# Patient Record
Sex: MALE | Race: WHITE | NOT HISPANIC OR LATINO | Employment: UNEMPLOYED | URBAN - METROPOLITAN AREA
[De-identification: names, ages, dates, MRNs, and addresses within clinical notes are randomized per-mention and may not be internally consistent; named-entity substitution may affect disease eponyms.]

---

## 2019-03-05 ENCOUNTER — HOSPITAL ENCOUNTER (EMERGENCY)
Facility: HOSPITAL | Age: 54
Discharge: HOME | End: 2019-03-06
Attending: EMERGENCY MEDICINE
Payer: COMMERCIAL

## 2019-03-05 DIAGNOSIS — E87.1 HYPONATREMIA: Primary | ICD-10-CM

## 2019-03-05 LAB
ANION GAP SERPL CALCULATED.3IONS-SCNC: 7 MMOL/L (ref 4–13)
BASOPHILS # BLD AUTO: 0.09 THOUSANDS/ΜL (ref 0–0.1)
BASOPHILS NFR BLD AUTO: 1 % (ref 0–1)
BUN SERPL-MCNC: 12 MG/DL (ref 5–25)
CALCIUM SERPL-MCNC: 8.8 MG/DL (ref 8.3–10.1)
CHLORIDE SERPL-SCNC: 95 MMOL/L (ref 100–108)
CO2 SERPL-SCNC: 28 MMOL/L (ref 21–32)
CREAT SERPL-MCNC: 0.65 MG/DL (ref 0.6–1.3)
EOSINOPHIL # BLD AUTO: 0.26 THOUSAND/ΜL (ref 0–0.61)
EOSINOPHIL NFR BLD AUTO: 4 % (ref 0–6)
ERYTHROCYTE [DISTWIDTH] IN BLOOD BY AUTOMATED COUNT: 13.2 % (ref 11.6–15.1)
GFR SERPL CREATININE-BSD FRML MDRD: 110 ML/MIN/1.73SQ M
GLUCOSE SERPL-MCNC: 87 MG/DL (ref 65–140)
HCT VFR BLD AUTO: 40.2 % (ref 36.5–49.3)
HGB BLD-MCNC: 13.9 G/DL (ref 12–17)
IMM GRANULOCYTES # BLD AUTO: 0.02 THOUSAND/UL (ref 0–0.2)
IMM GRANULOCYTES NFR BLD AUTO: 0 % (ref 0–2)
LYMPHOCYTES # BLD AUTO: 1.47 THOUSANDS/ΜL (ref 0.6–4.47)
LYMPHOCYTES NFR BLD AUTO: 22 % (ref 14–44)
MCH RBC QN AUTO: 32.3 PG (ref 26.8–34.3)
MCHC RBC AUTO-ENTMCNC: 34.6 G/DL (ref 31.4–37.4)
MCV RBC AUTO: 94 FL (ref 82–98)
MONOCYTES # BLD AUTO: 0.53 THOUSAND/ΜL (ref 0.17–1.22)
MONOCYTES NFR BLD AUTO: 8 % (ref 4–12)
NEUTROPHILS # BLD AUTO: 4.44 THOUSANDS/ΜL (ref 1.85–7.62)
NEUTS SEG NFR BLD AUTO: 65 % (ref 43–75)
NRBC BLD AUTO-RTO: 0 /100 WBCS
PLATELET # BLD AUTO: 239 THOUSANDS/UL (ref 149–390)
PMV BLD AUTO: 9.9 FL (ref 8.9–12.7)
POTASSIUM SERPL-SCNC: 3.3 MMOL/L (ref 3.5–5.3)
RBC # BLD AUTO: 4.3 MILLION/UL (ref 3.88–5.62)
SODIUM SERPL-SCNC: 130 MMOL/L (ref 136–145)
WBC # BLD AUTO: 6.81 THOUSAND/UL (ref 4.31–10.16)

## 2019-03-05 PROCEDURE — 85025 COMPLETE CBC W/AUTO DIFF WBC: CPT | Performed by: EMERGENCY MEDICINE

## 2019-03-05 PROCEDURE — 96361 HYDRATE IV INFUSION ADD-ON: CPT

## 2019-03-05 PROCEDURE — 99283 EMERGENCY DEPT VISIT LOW MDM: CPT

## 2019-03-05 PROCEDURE — 96360 HYDRATION IV INFUSION INIT: CPT

## 2019-03-05 PROCEDURE — 36415 COLL VENOUS BLD VENIPUNCTURE: CPT | Performed by: EMERGENCY MEDICINE

## 2019-03-05 PROCEDURE — 80048 BASIC METABOLIC PNL TOTAL CA: CPT | Performed by: EMERGENCY MEDICINE

## 2019-03-05 RX ORDER — FERROUS SULFATE 325(65) MG
325 TABLET ORAL
Status: ON HOLD | COMMUNITY
End: 2020-11-13 | Stop reason: ALTCHOICE

## 2019-03-05 RX ORDER — LAMOTRIGINE 200 MG/1
200 TABLET ORAL 2 TIMES DAILY
COMMUNITY

## 2019-03-05 RX ORDER — OXYBUTYNIN CHLORIDE 5 MG/1
5 TABLET ORAL DAILY
COMMUNITY

## 2019-03-05 RX ORDER — SUCRALFATE 1 G/1
1 TABLET ORAL 4 TIMES DAILY
Status: ON HOLD | COMMUNITY
End: 2020-11-13 | Stop reason: ALTCHOICE

## 2019-03-05 RX ORDER — ACETAMINOPHEN 325 MG/1
650 TABLET ORAL ONCE
Status: COMPLETED | OUTPATIENT
Start: 2019-03-05 | End: 2019-03-05

## 2019-03-05 RX ORDER — PANTOPRAZOLE SODIUM 40 MG/1
40 TABLET, DELAYED RELEASE ORAL DAILY
COMMUNITY
End: 2020-11-17 | Stop reason: HOSPADM

## 2019-03-05 RX ORDER — VENLAFAXINE 100 MG/1
150 TABLET ORAL DAILY
Status: ON HOLD | COMMUNITY
End: 2020-11-13 | Stop reason: ALTCHOICE

## 2019-03-05 RX ORDER — POTASSIUM CHLORIDE 20 MEQ/1
40 TABLET, EXTENDED RELEASE ORAL ONCE
Status: COMPLETED | OUTPATIENT
Start: 2019-03-05 | End: 2019-03-05

## 2019-03-05 RX ORDER — LEVETIRACETAM 1000 MG/1
1000 TABLET ORAL 2 TIMES DAILY
COMMUNITY
Start: 2017-05-09

## 2019-03-05 RX ORDER — ASPIRIN 81 MG/1
81 TABLET, CHEWABLE ORAL DAILY
COMMUNITY

## 2019-03-05 RX ADMIN — SODIUM CHLORIDE 1000 ML: 0.9 INJECTION, SOLUTION INTRAVENOUS at 21:36

## 2019-03-05 RX ADMIN — ACETAMINOPHEN 650 MG: 325 TABLET ORAL at 23:48

## 2019-03-05 RX ADMIN — POTASSIUM CHLORIDE 40 MEQ: 1500 TABLET, EXTENDED RELEASE ORAL at 23:48

## 2019-03-06 VITALS
TEMPERATURE: 98 F | RESPIRATION RATE: 18 BRPM | WEIGHT: 138.89 LBS | HEART RATE: 95 BPM | SYSTOLIC BLOOD PRESSURE: 132 MMHG | DIASTOLIC BLOOD PRESSURE: 85 MMHG | OXYGEN SATURATION: 98 %

## 2019-03-06 NOTE — ED ATTENDING ATTESTATION
Mc Mitchell MD, saw and evaluated the patient  I have discussed the patient with the resident/non-physician practitioner and agree with the resident's/non-physician practitioner's findings, Plan of Care, and MDM as documented in the resident's/non-physician practitioner's note, except where noted  All available labs and Radiology studies were reviewed  I was present for key portions of any procedure(s) performed by the resident/non-physician practitioner and I was immediately available to provide assistance  At this point I agree with the current assessment done in the Emergency Department  I have conducted an independent evaluation of this patient a history and physical is as follows: patient sent here from [facility] [place] run today for sodium level of 130  No seizures  No weakness  No changes in drinking habits  Heart RRR, lungs CTA, abdomen soft, nontender  ED Course as of Mar 05 2344   Tue Mar 05, 2019   2342 IV sodium chloride has was completely infused  Potassium will be replaced as well              Critical Care Time  Procedures

## 2019-03-06 NOTE — DISCHARGE INSTRUCTIONS
We repleted your sodium with IV NaCl and gave your oral potassium  You can increase the salt in your diet to help this as well

## 2019-03-06 NOTE — ED PROVIDER NOTES
History  Chief Complaint   Patient presents with    Abnormal Lab     Pt and EMS states pt has been sent to ED for Sodium level of 130  Pt has no complaints  HPI  Patient is 59-year-old male past medical history epilepsy presenting for evaluation of hyponatremia found on outpatient lab test at [facility]  Patient states that he was having routine blood work performed, has no specific complaints and states that he feels healthy and at his normal baseline  Patient denies headaches, vision changes, weakness, fatigue, confusion  Patient denies any changes in urination  Patient denies any recent change in his water intake  Patient denies fevers, chills, nausea, vomiting, chest pain, abdominal pain, diarrhea  Prior to Admission Medications   Prescriptions Last Dose Informant Patient Reported?  Taking?   aspirin 81 mg chewable tablet 3/5/2019 at Unknown time  Yes Yes   Sig: Chew 81 mg daily   ferrous sulfate 325 (65 Fe) mg tablet 3/5/2019 at Unknown time  Yes Yes   Sig: Take 325 mg by mouth daily with breakfast   lamoTRIgine (LaMICtal) 200 MG tablet 3/5/2019 at Unknown time  Yes Yes   Sig: Take 25 mg by mouth 2 (two) times a day   levETIRAcetam (KEPPRA) 1000 MG tablet 3/5/2019 at Unknown time  Yes Yes   Sig: Take 1,000 mg by mouth 2 (two) times a day   oxybutynin (DITROPAN) 5 mg tablet 3/5/2019 at Unknown time  Yes Yes   Sig: Take 5 mg by mouth daily   pantoprazole (PROTONIX) 40 mg tablet 3/5/2019 at Unknown time  Yes Yes   Sig: Take 40 mg by mouth daily   sucralfate (CARAFATE) 1 g tablet 3/5/2019 at Unknown time  Yes Yes   Sig: Take 1 g by mouth 4 (four) times a day   venlafaxine (EFFEXOR) 100 MG tablet 3/5/2019 at Unknown time  Yes Yes   Sig: Take 150 mg by mouth daily      Facility-Administered Medications: None       Past Medical History:   Diagnosis Date    GERD (gastroesophageal reflux disease)     Hx of AKA (above knee amputation) (HCC)     Right    Seizures (HCC)     Ulcer of esophagus        Past Surgical History:   Procedure Laterality Date    BELOW KNEE LEG AMPUTATION      Left       History reviewed  No pertinent family history  I have reviewed and agree with the history as documented  Social History     Tobacco Use    Smoking status: Former Smoker   Substance Use Topics    Alcohol use: Not Currently    Drug use: Not Currently        Review of Systems   Constitutional: Negative for chills and fever  HENT: Negative for sore throat  Eyes: Negative for photophobia and visual disturbance  Respiratory: Negative for cough, chest tightness, shortness of breath and wheezing  Cardiovascular: Negative for chest pain, palpitations and leg swelling  Gastrointestinal: Negative for abdominal distention, abdominal pain, constipation, diarrhea, nausea and vomiting  Genitourinary: Negative for difficulty urinating, dysuria, flank pain and hematuria  Musculoskeletal: Negative for gait problem, joint swelling and myalgias  Neurological: Negative for syncope, weakness, numbness and headaches  All other systems reviewed and are negative  Physical Exam  ED Triage Vitals   Temperature Pulse Respirations Blood Pressure SpO2   03/05/19 1826 03/05/19 1826 03/05/19 1826 03/05/19 1826 03/05/19 1826   98 °F (36 7 °C) 105 16 148/90 98 %      Temp src Heart Rate Source Patient Position - Orthostatic VS BP Location FiO2 (%)   -- 03/05/19 2119 03/05/19 2119 03/05/19 2119 --    Monitor Sitting Right arm       Pain Score       03/05/19 1826       No Pain           Orthostatic Vital Signs  Vitals:    03/05/19 1826 03/05/19 2119 03/05/19 2237 03/06/19 0030   BP: 148/90 141/89 127/83 132/85   Pulse: 105 (!) 108 101 95   Patient Position - Orthostatic VS:  Sitting Lying Lying       Physical Exam   Constitutional: He is oriented to person, place, and time  He appears well-developed and well-nourished  No distress  HENT:   Head: Normocephalic and atraumatic     Right Ear: External ear normal    Left Ear: External ear normal    Nose: Nose normal    Mouth/Throat: Oropharynx is clear and moist  No oropharyngeal exudate  Eyes: Pupils are equal, round, and reactive to light  Conjunctivae are normal    Cardiovascular: Normal rate, regular rhythm, normal heart sounds and intact distal pulses  Exam reveals no gallop and no friction rub  No murmur heard  Pulmonary/Chest: Effort normal and breath sounds normal  No respiratory distress  He has no wheezes  He exhibits no tenderness  Abdominal: Soft  Bowel sounds are normal  He exhibits no distension and no mass  There is no tenderness  There is no rebound and no guarding  Musculoskeletal: He exhibits no edema or deformity  Bilateral above knee amputations   Neurological: He is alert and oriented to person, place, and time  No cranial nerve deficit or sensory deficit  He exhibits normal muscle tone  Coordination normal    Skin: Skin is warm and dry  Capillary refill takes less than 2 seconds  No rash noted  He is not diaphoretic  No erythema  Psychiatric: He has a normal mood and affect  His behavior is normal    Nursing note and vitals reviewed        ED Medications  Medications   sodium chloride 0 9 % bolus 1,000 mL (0 mL Intravenous Stopped 3/6/19 0039)   potassium chloride (K-DUR,KLOR-CON) CR tablet 40 mEq (40 mEq Oral Given 3/5/19 2348)   acetaminophen (TYLENOL) tablet 650 mg (650 mg Oral Given 3/5/19 2348)       Diagnostic Studies  Results Reviewed     Procedure Component Value Units Date/Time    Basic metabolic panel [[id number]]  (Abnormal) Collected:  03/05/19 2136    Lab Status:  Final result Specimen:  Blood from Arm, Left Updated:  03/05/19 2206     Sodium 130 mmol/L      Potassium 3 3 mmol/L      Chloride 95 mmol/L      CO2 28 mmol/L      ANION GAP 7 mmol/L      BUN 12 mg/dL      Creatinine 0 65 mg/dL      Glucose 87 mg/dL      Calcium 8 8 mg/dL      eGFR 110 ml/min/1 73sq m     Narrative:       National Kidney Disease Education Program recommendations are as follows:  GFR calculation is accurate only with a steady state creatinine  Chronic Kidney disease less than 60 ml/min/1 73 sq  meters  Kidney failure less than 15 ml/min/1 73 sq  meters  CBC and differential [[id number]] Collected:  03/05/19 2136    Lab Status:  Final result Specimen:  Blood from Arm, Left Updated:  03/05/19 2147     WBC 6 81 Thousand/uL      RBC 4 30 Million/uL      Hemoglobin 13 9 g/dL      Hematocrit 40 2 %      MCV 94 fL      MCH 32 3 pg      MCHC 34 6 g/dL      RDW 13 2 %      MPV 9 9 fL      Platelets 904 Thousands/uL      nRBC 0 /100 WBCs      Neutrophils Relative 65 %      Immat GRANS % 0 %      Lymphocytes Relative 22 %      Monocytes Relative 8 %      Eosinophils Relative 4 %      Basophils Relative 1 %      Neutrophils Absolute 4 44 Thousands/µL      Immature Grans Absolute 0 02 Thousand/uL      Lymphocytes Absolute 1 47 Thousands/µL      Monocytes Absolute 0 53 Thousand/µL      Eosinophils Absolute 0 26 Thousand/µL      Basophils Absolute 0 09 Thousands/µL                  No orders to display         Procedures  Procedures      Phone Consults  ED Phone Contact    ED Course                               MDM  Number of Diagnoses or Management Options  Hyponatremia:   Diagnosis management comments: Patient is a 49-year-old male presenting with a symptomatic hyponatremia  Patient's sodium level 130 according to outpatient lab results which cannot be viewed at this time  Will repeat patient's BMP to confirm sodium level, replete with normal saline, reassess, and discharged back to his care facility if well appearing  Patient received 1 L normal saline, additionally repleted with 40 mg K-Dur, reassessed and appeared well  Discharged patient back to care facility         Amount and/or Complexity of Data Reviewed  Clinical lab tests: ordered and reviewed  Review and summarize past medical records: yes    Risk of Complications, Morbidity, and/or Mortality  Presenting problems: low  Diagnostic procedures: minimal  Management options: minimal    Patient Progress  Patient progress: improved      Disposition  Final diagnoses:   Hyponatremia     Time reflects when diagnosis was documented in both MDM as applicable and the Disposition within this note     Time User Action Codes Description Comment    3/5/2019 11:50 PM El Schirmer Add [E87 1] Hyponatremia       ED Disposition     ED Disposition Condition Date/Time Comment    Discharge Good Tue Mar 5, 2019 11:51 PM Trevor Waldrop discharge to home/self care  Follow-up Information    None         Discharge Medication List as of 3/5/2019 11:52 PM      CONTINUE these medications which have NOT CHANGED    Details   aspirin 81 mg chewable tablet Chew 81 mg daily, Historical Med      ferrous sulfate 325 (65 Fe) mg tablet Take 325 mg by mouth daily with breakfast, Historical Med      lamoTRIgine (LaMICtal) 200 MG tablet Take 25 mg by mouth 2 (two) times a day, Historical Med      levETIRAcetam (KEPPRA) 1000 MG tablet Take 1,000 mg by mouth 2 (two) times a day, Starting Tue 5/9/2017, Historical Med      oxybutynin (DITROPAN) 5 mg tablet Take 5 mg by mouth daily, Historical Med      pantoprazole (PROTONIX) 40 mg tablet Take 40 mg by mouth daily, Historical Med      sucralfate (CARAFATE) 1 g tablet Take 1 g by mouth 4 (four) times a day, Historical Med      venlafaxine (EFFEXOR) 100 MG tablet Take 150 mg by mouth daily, Historical Med           No discharge procedures on file  ED Provider  Attending physically available and evaluated Jose Coughlin  VIJAYA managed the patient along with the ED Attending      Electronically Signed by         Maximus Andrade MD  03/06/19 1400

## 2019-06-14 ENCOUNTER — OFFICE VISIT (OUTPATIENT)
Dept: GASTROENTEROLOGY | Facility: CLINIC | Age: 54
End: 2019-06-14
Payer: COMMERCIAL

## 2019-06-14 VITALS — SYSTOLIC BLOOD PRESSURE: 122 MMHG | DIASTOLIC BLOOD PRESSURE: 72 MMHG | HEART RATE: 96 BPM | WEIGHT: 135 LBS

## 2019-06-14 DIAGNOSIS — B18.2 CHRONIC HEPATITIS C WITHOUT HEPATIC COMA (HCC): ICD-10-CM

## 2019-06-14 DIAGNOSIS — Z12.11 COLON CANCER SCREENING: ICD-10-CM

## 2019-06-14 DIAGNOSIS — K21.00 GERD WITH ESOPHAGITIS: Primary | ICD-10-CM

## 2019-06-14 DIAGNOSIS — D64.9 ANEMIA, UNSPECIFIED TYPE: ICD-10-CM

## 2019-06-14 PROCEDURE — 99243 OFF/OP CNSLTJ NEW/EST LOW 30: CPT | Performed by: INTERNAL MEDICINE

## 2019-06-14 RX ORDER — MAGNESIUM HYDROXIDE/ALUMINUM HYDROXICE/SIMETHICONE 120; 1200; 1200 MG/30ML; MG/30ML; MG/30ML
30 SUSPENSION ORAL EVERY 4 HOURS PRN
COMMUNITY

## 2019-06-14 RX ORDER — BISACODYL 10 MG
10 SUPPOSITORY, RECTAL RECTAL DAILY
COMMUNITY

## 2019-06-14 RX ORDER — LANOLIN ALCOHOL/MO/W.PET/CERES
100 CREAM (GRAM) TOPICAL DAILY
Status: ON HOLD | COMMUNITY
End: 2020-11-13 | Stop reason: ALTCHOICE

## 2019-06-14 RX ORDER — ACETAMINOPHEN 325 MG/1
650 TABLET ORAL EVERY 4 HOURS PRN
COMMUNITY

## 2019-06-14 RX ORDER — DOCUSATE SODIUM 100 MG/1
100 CAPSULE, LIQUID FILLED ORAL 2 TIMES DAILY
Qty: 10 CAPSULE | Refills: 0 | Status: SHIPPED | OUTPATIENT
Start: 2019-06-14

## 2019-06-14 RX ORDER — ACETAMINOPHEN 500 MG
500 TABLET ORAL EVERY 4 HOURS PRN
COMMUNITY

## 2019-06-24 LAB — HCV AB SER-ACNC: REACTIVE

## 2019-07-08 ENCOUNTER — TELEPHONE (OUTPATIENT)
Dept: NEUROLOGY | Facility: CLINIC | Age: 54
End: 2019-07-08
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
## 2019-10-01 ENCOUNTER — CONSULT (OUTPATIENT)
Dept: NEUROLOGY | Facility: CLINIC | Age: 54
End: 2019-10-01
Payer: COMMERCIAL

## 2019-10-01 ENCOUNTER — TRANSCRIBE ORDERS (OUTPATIENT)
Dept: NEUROLOGY | Facility: CLINIC | Age: 54
End: 2019-10-01

## 2019-10-01 VITALS — HEART RATE: 92 BPM | SYSTOLIC BLOOD PRESSURE: 137 MMHG | DIASTOLIC BLOOD PRESSURE: 83 MMHG

## 2019-10-01 DIAGNOSIS — G40.201 PARTIAL SYMPTOMATIC EPILEPSY WITH COMPLEX PARTIAL SEIZURES, NOT INTRACTABLE, WITH STATUS EPILEPTICUS (HCC): Primary | ICD-10-CM

## 2019-10-01 DIAGNOSIS — F31.9 BIPOLAR AFFECTIVE DISORDER, REMISSION STATUS UNSPECIFIED (HCC): ICD-10-CM

## 2019-10-01 DIAGNOSIS — R56.9 SEIZURES (HCC): Primary | ICD-10-CM

## 2019-10-01 DIAGNOSIS — R56.9 SEIZURES (HCC): ICD-10-CM

## 2019-10-01 PROBLEM — K21.9 GERD (GASTROESOPHAGEAL REFLUX DISEASE): Status: ACTIVE | Noted: 2017-04-24

## 2019-10-01 PROBLEM — F32.A DEPRESSION: Status: ACTIVE | Noted: 2019-10-01

## 2019-10-01 PROBLEM — G40.109 PARTIAL SYMPTOMATIC EPILEPSY (HCC): Status: ACTIVE | Noted: 2019-10-01

## 2019-10-01 PROBLEM — S88.911A: Status: ACTIVE | Noted: 2019-10-01

## 2019-10-01 PROBLEM — S88.912A: Status: ACTIVE | Noted: 2019-10-01

## 2019-10-01 PROCEDURE — 99243 OFF/OP CNSLTJ NEW/EST LOW 30: CPT | Performed by: PSYCHIATRY & NEUROLOGY

## 2019-10-01 RX ORDER — FOLIC ACID 1 MG/1
TABLET ORAL DAILY
Status: ON HOLD | COMMUNITY
End: 2020-11-13 | Stop reason: ALTCHOICE

## 2019-10-01 NOTE — PATIENT INSTRUCTIONS
-- Continue to take Levetiracetam 1000 mg twice a day and lamotrigine 200 mg twice a day  -- Please call our office if any problems arise

## 2019-10-01 NOTE — ASSESSMENT & PLAN NOTE
Based on review of his history and prior notes, it appears he has had focal brain injury on the right hemisphere (unclear if from trauma or stroke), and this likely is the cause of his seizures  His history and descriptions of his seizures is not entirely clear, but at this point, he appears to be seizure free since the changes in his medications, which occurred in July  He is currently tolerating his medications well, so I will not make any changes today  -- he will continue lamotrigine 200 mg twice a day and Levetiracetam 1000 mg twice a day  If he continues to have seizures, his medication doses could be increased

## 2020-11-02 ENCOUNTER — TELEPHONE (OUTPATIENT)
Dept: NEUROLOGY | Facility: CLINIC | Age: 55
End: 2020-11-02

## 2020-11-13 ENCOUNTER — APPOINTMENT (EMERGENCY)
Dept: RADIOLOGY | Facility: HOSPITAL | Age: 55
DRG: 720 | End: 2020-11-13
Payer: COMMERCIAL

## 2020-11-13 ENCOUNTER — HOSPITAL ENCOUNTER (INPATIENT)
Facility: HOSPITAL | Age: 55
LOS: 3 days | Discharge: NURSING, REHAB OR LONG-TERM CARE | DRG: 720 | End: 2020-11-17
Attending: EMERGENCY MEDICINE | Admitting: INTERNAL MEDICINE
Payer: COMMERCIAL

## 2020-11-13 DIAGNOSIS — K21.9 GASTROESOPHAGEAL REFLUX DISEASE, UNSPECIFIED WHETHER ESOPHAGITIS PRESENT: ICD-10-CM

## 2020-11-13 DIAGNOSIS — N10 ACUTE PYELONEPHRITIS: ICD-10-CM

## 2020-11-13 DIAGNOSIS — N39.0 URINARY TRACT INFECTION: ICD-10-CM

## 2020-11-13 DIAGNOSIS — E87.1 HYPONATREMIA: Primary | ICD-10-CM

## 2020-11-13 PROBLEM — I10 HYPERTENSION: Status: ACTIVE | Noted: 2020-11-13

## 2020-11-13 LAB
ALBUMIN SERPL BCP-MCNC: 3.4 G/DL (ref 3.5–5)
ALP SERPL-CCNC: 76 U/L (ref 46–116)
ALT SERPL W P-5'-P-CCNC: 26 U/L (ref 12–78)
ANION GAP SERPL CALCULATED.3IONS-SCNC: 7 MMOL/L (ref 4–13)
APTT PPP: 38 SECONDS (ref 23–37)
AST SERPL W P-5'-P-CCNC: 13 U/L (ref 5–45)
BACTERIA UR QL AUTO: ABNORMAL /HPF
BASOPHILS # BLD AUTO: 0.04 THOUSANDS/ΜL (ref 0–0.1)
BASOPHILS NFR BLD AUTO: 1 % (ref 0–1)
BILIRUB SERPL-MCNC: 0.4 MG/DL (ref 0.2–1)
BILIRUB UR QL STRIP: NEGATIVE
BUN SERPL-MCNC: 13 MG/DL (ref 5–25)
CALCIUM ALBUM COR SERPL-MCNC: 9.3 MG/DL (ref 8.3–10.1)
CALCIUM SERPL-MCNC: 8.8 MG/DL (ref 8.3–10.1)
CHLORIDE SERPL-SCNC: 88 MMOL/L (ref 100–108)
CLARITY UR: ABNORMAL
CO2 SERPL-SCNC: 28 MMOL/L (ref 21–32)
COLOR UR: YELLOW
CREAT SERPL-MCNC: 0.92 MG/DL (ref 0.6–1.3)
EOSINOPHIL # BLD AUTO: 0.01 THOUSAND/ΜL (ref 0–0.61)
EOSINOPHIL NFR BLD AUTO: 0 % (ref 0–6)
ERYTHROCYTE [DISTWIDTH] IN BLOOD BY AUTOMATED COUNT: 12.7 % (ref 11.6–15.1)
FLUAV RNA RESP QL NAA+PROBE: NEGATIVE
FLUBV RNA RESP QL NAA+PROBE: NEGATIVE
GFR SERPL CREATININE-BSD FRML MDRD: 93 ML/MIN/1.73SQ M
GLUCOSE SERPL-MCNC: 99 MG/DL (ref 65–140)
GLUCOSE UR STRIP-MCNC: NEGATIVE MG/DL
HCT VFR BLD AUTO: 34.8 % (ref 36.5–49.3)
HGB BLD-MCNC: 12.1 G/DL (ref 12–17)
HGB UR QL STRIP.AUTO: ABNORMAL
IMM GRANULOCYTES # BLD AUTO: 0.04 THOUSAND/UL (ref 0–0.2)
IMM GRANULOCYTES NFR BLD AUTO: 1 % (ref 0–2)
INR PPP: 1.06 (ref 0.84–1.19)
KETONES UR STRIP-MCNC: NEGATIVE MG/DL
LACTATE SERPL-SCNC: 1.2 MMOL/L (ref 0.5–2)
LEUKOCYTE ESTERASE UR QL STRIP: ABNORMAL
LYMPHOCYTES # BLD AUTO: 0.62 THOUSANDS/ΜL (ref 0.6–4.47)
LYMPHOCYTES NFR BLD AUTO: 7 % (ref 14–44)
MCH RBC QN AUTO: 31.7 PG (ref 26.8–34.3)
MCHC RBC AUTO-ENTMCNC: 34.8 G/DL (ref 31.4–37.4)
MCV RBC AUTO: 91 FL (ref 82–98)
MONOCYTES # BLD AUTO: 0.86 THOUSAND/ΜL (ref 0.17–1.22)
MONOCYTES NFR BLD AUTO: 10 % (ref 4–12)
NEUTROPHILS # BLD AUTO: 6.99 THOUSANDS/ΜL (ref 1.85–7.62)
NEUTS SEG NFR BLD AUTO: 81 % (ref 43–75)
NITRITE UR QL STRIP: POSITIVE
NON-SQ EPI CELLS URNS QL MICRO: ABNORMAL /HPF
NRBC BLD AUTO-RTO: 0 /100 WBCS
PH UR STRIP.AUTO: 7 [PH]
PLATELET # BLD AUTO: 167 THOUSANDS/UL (ref 149–390)
PMV BLD AUTO: 9.4 FL (ref 8.9–12.7)
POTASSIUM SERPL-SCNC: 4.5 MMOL/L (ref 3.5–5.3)
PROT SERPL-MCNC: 8.2 G/DL (ref 6.4–8.2)
PROT UR STRIP-MCNC: ABNORMAL MG/DL
PROTHROMBIN TIME: 13.8 SECONDS (ref 11.6–14.5)
RBC # BLD AUTO: 3.82 MILLION/UL (ref 3.88–5.62)
RBC #/AREA URNS AUTO: ABNORMAL /HPF
RSV RNA RESP QL NAA+PROBE: NEGATIVE
SARS-COV-2 RNA RESP QL NAA+PROBE: NEGATIVE
SODIUM SERPL-SCNC: 123 MMOL/L (ref 136–145)
SP GR UR STRIP.AUTO: 1.01 (ref 1–1.03)
UROBILINOGEN UR QL STRIP.AUTO: 0.2 E.U./DL
WBC # BLD AUTO: 8.56 THOUSAND/UL (ref 4.31–10.16)
WBC #/AREA URNS AUTO: ABNORMAL /HPF

## 2020-11-13 PROCEDURE — 83605 ASSAY OF LACTIC ACID: CPT | Performed by: EMERGENCY MEDICINE

## 2020-11-13 PROCEDURE — 87077 CULTURE AEROBIC IDENTIFY: CPT | Performed by: EMERGENCY MEDICINE

## 2020-11-13 PROCEDURE — 99285 EMERGENCY DEPT VISIT HI MDM: CPT | Performed by: EMERGENCY MEDICINE

## 2020-11-13 PROCEDURE — 85025 COMPLETE CBC W/AUTO DIFF WBC: CPT | Performed by: EMERGENCY MEDICINE

## 2020-11-13 PROCEDURE — 84300 ASSAY OF URINE SODIUM: CPT | Performed by: INTERNAL MEDICINE

## 2020-11-13 PROCEDURE — 81001 URINALYSIS AUTO W/SCOPE: CPT | Performed by: EMERGENCY MEDICINE

## 2020-11-13 PROCEDURE — 87086 URINE CULTURE/COLONY COUNT: CPT | Performed by: EMERGENCY MEDICINE

## 2020-11-13 PROCEDURE — 85730 THROMBOPLASTIN TIME PARTIAL: CPT | Performed by: EMERGENCY MEDICINE

## 2020-11-13 PROCEDURE — 99285 EMERGENCY DEPT VISIT HI MDM: CPT

## 2020-11-13 PROCEDURE — 83935 ASSAY OF URINE OSMOLALITY: CPT | Performed by: INTERNAL MEDICINE

## 2020-11-13 PROCEDURE — 82570 ASSAY OF URINE CREATININE: CPT | Performed by: INTERNAL MEDICINE

## 2020-11-13 PROCEDURE — 80053 COMPREHEN METABOLIC PANEL: CPT | Performed by: EMERGENCY MEDICINE

## 2020-11-13 PROCEDURE — 71045 X-RAY EXAM CHEST 1 VIEW: CPT

## 2020-11-13 PROCEDURE — 0241U HB NFCT DS VIR RESP RNA 4 TRGT: CPT | Performed by: EMERGENCY MEDICINE

## 2020-11-13 PROCEDURE — 85610 PROTHROMBIN TIME: CPT | Performed by: EMERGENCY MEDICINE

## 2020-11-13 PROCEDURE — 87040 BLOOD CULTURE FOR BACTERIA: CPT | Performed by: EMERGENCY MEDICINE

## 2020-11-13 PROCEDURE — 36415 COLL VENOUS BLD VENIPUNCTURE: CPT | Performed by: EMERGENCY MEDICINE

## 2020-11-13 PROCEDURE — 96360 HYDRATION IV INFUSION INIT: CPT

## 2020-11-13 PROCEDURE — 99219 PR INITIAL OBSERVATION CARE/DAY 50 MINUTES: CPT | Performed by: INTERNAL MEDICINE

## 2020-11-13 PROCEDURE — 87186 SC STD MICRODIL/AGAR DIL: CPT | Performed by: EMERGENCY MEDICINE

## 2020-11-13 RX ORDER — AMLODIPINE BESYLATE 5 MG/1
5 TABLET ORAL DAILY
Status: DISCONTINUED | OUTPATIENT
Start: 2020-11-14 | End: 2020-11-13

## 2020-11-13 RX ORDER — VENLAFAXINE 37.5 MG/1
150 TABLET ORAL DAILY
Status: DISCONTINUED | OUTPATIENT
Start: 2020-11-14 | End: 2020-11-14

## 2020-11-13 RX ORDER — ONDANSETRON 2 MG/ML
4 INJECTION INTRAMUSCULAR; INTRAVENOUS EVERY 6 HOURS PRN
Status: DISCONTINUED | OUTPATIENT
Start: 2020-11-13 | End: 2020-11-17 | Stop reason: HOSPADM

## 2020-11-13 RX ORDER — FUROSEMIDE 20 MG/1
10 TABLET ORAL DAILY
COMMUNITY

## 2020-11-13 RX ORDER — ASPIRIN 81 MG/1
81 TABLET, CHEWABLE ORAL DAILY
Status: DISCONTINUED | OUTPATIENT
Start: 2020-11-14 | End: 2020-11-17 | Stop reason: HOSPADM

## 2020-11-13 RX ORDER — LEVETIRACETAM 500 MG/1
500 TABLET ORAL EVERY 12 HOURS SCHEDULED
COMMUNITY
End: 2021-09-07

## 2020-11-13 RX ORDER — THIAMINE MONONITRATE (VIT B1) 100 MG
100 TABLET ORAL DAILY
Status: DISCONTINUED | OUTPATIENT
Start: 2020-11-14 | End: 2020-11-17 | Stop reason: HOSPADM

## 2020-11-13 RX ORDER — PANTOPRAZOLE SODIUM 40 MG/1
40 TABLET, DELAYED RELEASE ORAL
Status: DISCONTINUED | OUTPATIENT
Start: 2020-11-14 | End: 2020-11-15

## 2020-11-13 RX ORDER — FOLIC ACID 1 MG/1
1 TABLET ORAL DAILY
Status: DISCONTINUED | OUTPATIENT
Start: 2020-11-14 | End: 2020-11-17 | Stop reason: HOSPADM

## 2020-11-13 RX ORDER — CEFTRIAXONE 2 G/50ML
2000 INJECTION, SOLUTION INTRAVENOUS EVERY 24 HOURS
Status: DISCONTINUED | OUTPATIENT
Start: 2020-11-14 | End: 2020-11-15

## 2020-11-13 RX ORDER — SUCRALFATE 1 G/1
1 TABLET ORAL 4 TIMES DAILY
Status: DISCONTINUED | OUTPATIENT
Start: 2020-11-14 | End: 2020-11-14

## 2020-11-13 RX ORDER — LAMOTRIGINE 100 MG/1
200 TABLET ORAL 2 TIMES DAILY
Status: DISCONTINUED | OUTPATIENT
Start: 2020-11-14 | End: 2020-11-17 | Stop reason: HOSPADM

## 2020-11-13 RX ORDER — CEFTRIAXONE 1 G/50ML
1000 INJECTION, SOLUTION INTRAVENOUS ONCE
Status: COMPLETED | OUTPATIENT
Start: 2020-11-13 | End: 2020-11-13

## 2020-11-13 RX ORDER — OXYBUTYNIN CHLORIDE 5 MG/1
5 TABLET ORAL DAILY
Status: DISCONTINUED | OUTPATIENT
Start: 2020-11-14 | End: 2020-11-17 | Stop reason: HOSPADM

## 2020-11-13 RX ORDER — BISACODYL 10 MG
10 SUPPOSITORY, RECTAL RECTAL DAILY
Status: DISCONTINUED | OUTPATIENT
Start: 2020-11-14 | End: 2020-11-14

## 2020-11-13 RX ORDER — MIRTAZAPINE 15 MG/1
7.5 TABLET, FILM COATED ORAL
Status: DISCONTINUED | OUTPATIENT
Start: 2020-11-14 | End: 2020-11-17 | Stop reason: HOSPADM

## 2020-11-13 RX ORDER — MAGNESIUM HYDROXIDE/ALUMINUM HYDROXICE/SIMETHICONE 120; 1200; 1200 MG/30ML; MG/30ML; MG/30ML
30 SUSPENSION ORAL EVERY 4 HOURS PRN
Status: DISCONTINUED | OUTPATIENT
Start: 2020-11-13 | End: 2020-11-17 | Stop reason: HOSPADM

## 2020-11-13 RX ORDER — FUROSEMIDE 20 MG/1
10 TABLET ORAL DAILY
Status: DISCONTINUED | OUTPATIENT
Start: 2020-11-14 | End: 2020-11-13

## 2020-11-13 RX ORDER — ACETAMINOPHEN 325 MG/1
650 TABLET ORAL ONCE
Status: COMPLETED | OUTPATIENT
Start: 2020-11-13 | End: 2020-11-13

## 2020-11-13 RX ORDER — DOCUSATE SODIUM 100 MG/1
100 CAPSULE, LIQUID FILLED ORAL 2 TIMES DAILY
Status: DISCONTINUED | OUTPATIENT
Start: 2020-11-14 | End: 2020-11-14

## 2020-11-13 RX ORDER — LANOLIN ALCOHOL/MO/W.PET/CERES
3 CREAM (GRAM) TOPICAL
Status: DISCONTINUED | OUTPATIENT
Start: 2020-11-14 | End: 2020-11-14

## 2020-11-13 RX ORDER — ACETAMINOPHEN 325 MG/1
650 TABLET ORAL EVERY 6 HOURS PRN
Status: DISCONTINUED | OUTPATIENT
Start: 2020-11-13 | End: 2020-11-14

## 2020-11-13 RX ORDER — FERROUS SULFATE 325(65) MG
325 TABLET ORAL
Status: DISCONTINUED | OUTPATIENT
Start: 2020-11-14 | End: 2020-11-17 | Stop reason: HOSPADM

## 2020-11-13 RX ORDER — LEVETIRACETAM 500 MG/1
1000 TABLET ORAL 2 TIMES DAILY
Status: DISCONTINUED | OUTPATIENT
Start: 2020-11-14 | End: 2020-11-14

## 2020-11-13 RX ORDER — MIRTAZAPINE 7.5 MG/1
15 TABLET, FILM COATED ORAL
COMMUNITY

## 2020-11-13 RX ADMIN — ACETAMINOPHEN 650 MG: 325 TABLET ORAL at 22:09

## 2020-11-13 RX ADMIN — CEFTRIAXONE 1000 MG: 1 INJECTION, SOLUTION INTRAVENOUS at 22:11

## 2020-11-13 RX ADMIN — SODIUM CHLORIDE 1000 ML: 0.9 INJECTION, SOLUTION INTRAVENOUS at 20:34

## 2020-11-14 ENCOUNTER — APPOINTMENT (OUTPATIENT)
Dept: CT IMAGING | Facility: HOSPITAL | Age: 55
DRG: 720 | End: 2020-11-14
Payer: COMMERCIAL

## 2020-11-14 PROBLEM — A41.9 SEPSIS (HCC): Status: ACTIVE | Noted: 2020-11-14

## 2020-11-14 LAB
ANION GAP SERPL CALCULATED.3IONS-SCNC: 11 MMOL/L (ref 4–13)
ANION GAP SERPL CALCULATED.3IONS-SCNC: 12 MMOL/L (ref 4–13)
ANION GAP SERPL CALCULATED.3IONS-SCNC: 7 MMOL/L (ref 4–13)
BUN SERPL-MCNC: 10 MG/DL (ref 5–25)
BUN SERPL-MCNC: 9 MG/DL (ref 5–25)
BUN SERPL-MCNC: 9 MG/DL (ref 5–25)
CALCIUM SERPL-MCNC: 8.3 MG/DL (ref 8.3–10.1)
CALCIUM SERPL-MCNC: 8.7 MG/DL (ref 8.3–10.1)
CALCIUM SERPL-MCNC: 9.3 MG/DL (ref 8.3–10.1)
CHLORIDE SERPL-SCNC: 87 MMOL/L (ref 100–108)
CHLORIDE SERPL-SCNC: 89 MMOL/L (ref 100–108)
CHLORIDE SERPL-SCNC: 91 MMOL/L (ref 100–108)
CO2 SERPL-SCNC: 20 MMOL/L (ref 21–32)
CO2 SERPL-SCNC: 21 MMOL/L (ref 21–32)
CO2 SERPL-SCNC: 25 MMOL/L (ref 21–32)
CREAT SERPL-MCNC: 0.7 MG/DL (ref 0.6–1.3)
CREAT SERPL-MCNC: 0.86 MG/DL (ref 0.6–1.3)
CREAT SERPL-MCNC: 0.95 MG/DL (ref 0.6–1.3)
CREAT UR-MCNC: 43.8 MG/DL
GFR SERPL CREATININE-BSD FRML MDRD: 106 ML/MIN/1.73SQ M
GFR SERPL CREATININE-BSD FRML MDRD: 90 ML/MIN/1.73SQ M
GFR SERPL CREATININE-BSD FRML MDRD: 98 ML/MIN/1.73SQ M
GLUCOSE SERPL-MCNC: 109 MG/DL (ref 65–140)
GLUCOSE SERPL-MCNC: 112 MG/DL (ref 65–140)
GLUCOSE SERPL-MCNC: 92 MG/DL (ref 65–140)
OSMOLALITY UR: 317 MMOL/KG
POTASSIUM SERPL-SCNC: 3.8 MMOL/L (ref 3.5–5.3)
POTASSIUM SERPL-SCNC: 4.4 MMOL/L (ref 3.5–5.3)
POTASSIUM SERPL-SCNC: 5.7 MMOL/L (ref 3.5–5.3)
SODIUM 24H UR-SCNC: 72 MOL/L
SODIUM SERPL-SCNC: 117 MMOL/L (ref 136–145)
SODIUM SERPL-SCNC: 123 MMOL/L (ref 136–145)
SODIUM SERPL-SCNC: 123 MMOL/L (ref 136–145)

## 2020-11-14 PROCEDURE — 74177 CT ABD & PELVIS W/CONTRAST: CPT

## 2020-11-14 PROCEDURE — 80048 BASIC METABOLIC PNL TOTAL CA: CPT | Performed by: INTERNAL MEDICINE

## 2020-11-14 PROCEDURE — 87522 HEPATITIS C REVRS TRNSCRPJ: CPT | Performed by: INTERNAL MEDICINE

## 2020-11-14 PROCEDURE — 99255 IP/OBS CONSLTJ NEW/EST HI 80: CPT | Performed by: INTERNAL MEDICINE

## 2020-11-14 PROCEDURE — 71260 CT THORAX DX C+: CPT

## 2020-11-14 PROCEDURE — 99232 SBSQ HOSP IP/OBS MODERATE 35: CPT | Performed by: PHYSICIAN ASSISTANT

## 2020-11-14 PROCEDURE — G1004 CDSM NDSC: HCPCS

## 2020-11-14 RX ORDER — LANOLIN ALCOHOL/MO/W.PET/CERES
6 CREAM (GRAM) TOPICAL
Status: DISCONTINUED | OUTPATIENT
Start: 2020-11-14 | End: 2020-11-17 | Stop reason: HOSPADM

## 2020-11-14 RX ORDER — SODIUM CHLORIDE 1000 MG
1 TABLET, SOLUBLE MISCELLANEOUS
Status: DISCONTINUED | OUTPATIENT
Start: 2020-11-14 | End: 2020-11-14

## 2020-11-14 RX ORDER — SODIUM CHLORIDE 1000 MG
2 TABLET, SOLUBLE MISCELLANEOUS
Status: DISCONTINUED | OUTPATIENT
Start: 2020-11-14 | End: 2020-11-16

## 2020-11-14 RX ORDER — DOCUSATE SODIUM 100 MG/1
100 CAPSULE, LIQUID FILLED ORAL DAILY
Status: DISCONTINUED | OUTPATIENT
Start: 2020-11-15 | End: 2020-11-17 | Stop reason: HOSPADM

## 2020-11-14 RX ORDER — LEVETIRACETAM 500 MG/1
1500 TABLET ORAL 2 TIMES DAILY
Status: DISCONTINUED | OUTPATIENT
Start: 2020-11-14 | End: 2020-11-17 | Stop reason: HOSPADM

## 2020-11-14 RX ORDER — ACETAMINOPHEN 325 MG/1
650 TABLET ORAL EVERY 6 HOURS PRN
Status: DISCONTINUED | OUTPATIENT
Start: 2020-11-14 | End: 2020-11-17 | Stop reason: HOSPADM

## 2020-11-14 RX ADMIN — FOLIC ACID 1 MG: 1 TABLET ORAL at 08:40

## 2020-11-14 RX ADMIN — MELATONIN TAB 3 MG 6 MG: 3 TAB at 21:38

## 2020-11-14 RX ADMIN — MELATONIN TAB 3 MG 3 MG: 3 TAB at 00:29

## 2020-11-14 RX ADMIN — ACETAMINOPHEN 650 MG: 325 TABLET ORAL at 22:04

## 2020-11-14 RX ADMIN — PANTOPRAZOLE SODIUM 40 MG: 40 TABLET, DELAYED RELEASE ORAL at 05:25

## 2020-11-14 RX ADMIN — ENOXAPARIN SODIUM 40 MG: 100 INJECTION SUBCUTANEOUS at 08:34

## 2020-11-14 RX ADMIN — DOCUSATE SODIUM 100 MG: 100 CAPSULE ORAL at 08:34

## 2020-11-14 RX ADMIN — FERROUS SULFATE TAB 325 MG (65 MG ELEMENTAL FE) 325 MG: 325 (65 FE) TAB at 08:33

## 2020-11-14 RX ADMIN — IOHEXOL 100 ML: 350 INJECTION, SOLUTION INTRAVENOUS at 11:55

## 2020-11-14 RX ADMIN — DICLOFENAC SODIUM 2 G: 10 GEL TOPICAL at 20:57

## 2020-11-14 RX ADMIN — CEFTRIAXONE 2000 MG: 2 INJECTION, SOLUTION INTRAVENOUS at 10:59

## 2020-11-14 RX ADMIN — LAMOTRIGINE 200 MG: 100 TABLET ORAL at 08:36

## 2020-11-14 RX ADMIN — LEVETIRACETAM 1500 MG: 500 TABLET, FILM COATED ORAL at 17:08

## 2020-11-14 RX ADMIN — MIRTAZAPINE 7.5 MG: 15 TABLET, FILM COATED ORAL at 00:54

## 2020-11-14 RX ADMIN — SODIUM CHLORIDE 1 G: 1 TABLET ORAL at 12:16

## 2020-11-14 RX ADMIN — SODIUM CHLORIDE 2 G: 1 TABLET ORAL at 16:02

## 2020-11-14 RX ADMIN — LEVETIRACETAM 1000 MG: 500 TABLET, FILM COATED ORAL at 08:37

## 2020-11-14 RX ADMIN — ASPIRIN 81 MG CHEWABLE TABLET 81 MG: 81 TABLET CHEWABLE at 08:33

## 2020-11-14 RX ADMIN — ACETAMINOPHEN 650 MG: 325 TABLET ORAL at 03:06

## 2020-11-14 RX ADMIN — THIAMINE HCL TAB 100 MG 100 MG: 100 TAB at 08:41

## 2020-11-14 RX ADMIN — ACETAMINOPHEN 650 MG: 325 TABLET ORAL at 16:02

## 2020-11-14 RX ADMIN — LAMOTRIGINE 200 MG: 100 TABLET ORAL at 17:08

## 2020-11-14 RX ADMIN — OXYBUTYNIN CHLORIDE 5 MG: 5 TABLET ORAL at 08:40

## 2020-11-14 RX ADMIN — MIRTAZAPINE 7.5 MG: 15 TABLET, FILM COATED ORAL at 21:38

## 2020-11-15 PROBLEM — R00.0 SINUS TACHYCARDIA: Status: ACTIVE | Noted: 2020-11-15

## 2020-11-15 LAB
ANION GAP SERPL CALCULATED.3IONS-SCNC: 8 MMOL/L (ref 4–13)
ATRIAL RATE: 89 BPM
BASOPHILS # BLD AUTO: 0.04 THOUSANDS/ΜL (ref 0–0.1)
BASOPHILS NFR BLD AUTO: 1 % (ref 0–1)
BUN SERPL-MCNC: 11 MG/DL (ref 5–25)
CALCIUM SERPL-MCNC: 8.9 MG/DL (ref 8.3–10.1)
CHLORIDE SERPL-SCNC: 93 MMOL/L (ref 100–108)
CO2 SERPL-SCNC: 26 MMOL/L (ref 21–32)
CREAT SERPL-MCNC: 0.8 MG/DL (ref 0.6–1.3)
EOSINOPHIL # BLD AUTO: 0.04 THOUSAND/ΜL (ref 0–0.61)
EOSINOPHIL NFR BLD AUTO: 1 % (ref 0–6)
ERYTHROCYTE [DISTWIDTH] IN BLOOD BY AUTOMATED COUNT: 12.6 % (ref 11.6–15.1)
GFR SERPL CREATININE-BSD FRML MDRD: 101 ML/MIN/1.73SQ M
GLUCOSE SERPL-MCNC: 115 MG/DL (ref 65–140)
HCT VFR BLD AUTO: 32.6 % (ref 36.5–49.3)
HGB BLD-MCNC: 11.6 G/DL (ref 12–17)
IMM GRANULOCYTES # BLD AUTO: 0.02 THOUSAND/UL (ref 0–0.2)
IMM GRANULOCYTES NFR BLD AUTO: 0 % (ref 0–2)
LYMPHOCYTES # BLD AUTO: 0.81 THOUSANDS/ΜL (ref 0.6–4.47)
LYMPHOCYTES NFR BLD AUTO: 12 % (ref 14–44)
MCH RBC QN AUTO: 32 PG (ref 26.8–34.3)
MCHC RBC AUTO-ENTMCNC: 35.6 G/DL (ref 31.4–37.4)
MCV RBC AUTO: 90 FL (ref 82–98)
MONOCYTES # BLD AUTO: 1.19 THOUSAND/ΜL (ref 0.17–1.22)
MONOCYTES NFR BLD AUTO: 17 % (ref 4–12)
NEUTROPHILS # BLD AUTO: 4.72 THOUSANDS/ΜL (ref 1.85–7.62)
NEUTS SEG NFR BLD AUTO: 69 % (ref 43–75)
NRBC BLD AUTO-RTO: 0 /100 WBCS
P AXIS: 69 DEGREES
PLATELET # BLD AUTO: 147 THOUSANDS/UL (ref 149–390)
PMV BLD AUTO: 9.5 FL (ref 8.9–12.7)
POTASSIUM SERPL-SCNC: 3.6 MMOL/L (ref 3.5–5.3)
PR INTERVAL: 152 MS
QRS AXIS: 51 DEGREES
QRSD INTERVAL: 76 MS
QT INTERVAL: 344 MS
QTC INTERVAL: 418 MS
RBC # BLD AUTO: 3.62 MILLION/UL (ref 3.88–5.62)
SODIUM SERPL-SCNC: 127 MMOL/L (ref 136–145)
T WAVE AXIS: 59 DEGREES
VENTRICULAR RATE: 89 BPM
WBC # BLD AUTO: 6.82 THOUSAND/UL (ref 4.31–10.16)

## 2020-11-15 PROCEDURE — 99232 SBSQ HOSP IP/OBS MODERATE 35: CPT | Performed by: PHYSICIAN ASSISTANT

## 2020-11-15 PROCEDURE — 93005 ELECTROCARDIOGRAM TRACING: CPT

## 2020-11-15 PROCEDURE — 99232 SBSQ HOSP IP/OBS MODERATE 35: CPT | Performed by: INTERNAL MEDICINE

## 2020-11-15 PROCEDURE — 93010 ELECTROCARDIOGRAM REPORT: CPT | Performed by: INTERNAL MEDICINE

## 2020-11-15 PROCEDURE — 85025 COMPLETE CBC W/AUTO DIFF WBC: CPT | Performed by: PHYSICIAN ASSISTANT

## 2020-11-15 PROCEDURE — 80048 BASIC METABOLIC PNL TOTAL CA: CPT | Performed by: INTERNAL MEDICINE

## 2020-11-15 RX ORDER — FAMOTIDINE 20 MG/1
20 TABLET, FILM COATED ORAL DAILY
Status: DISCONTINUED | OUTPATIENT
Start: 2020-11-15 | End: 2020-11-17 | Stop reason: HOSPADM

## 2020-11-15 RX ORDER — CEFEPIME HYDROCHLORIDE 2 G/50ML
2000 INJECTION, SOLUTION INTRAVENOUS EVERY 12 HOURS
Status: DISCONTINUED | OUTPATIENT
Start: 2020-11-15 | End: 2020-11-17 | Stop reason: HOSPADM

## 2020-11-15 RX ADMIN — DOCUSATE SODIUM 100 MG: 100 CAPSULE ORAL at 20:25

## 2020-11-15 RX ADMIN — ASPIRIN 81 MG CHEWABLE TABLET 81 MG: 81 TABLET CHEWABLE at 08:29

## 2020-11-15 RX ADMIN — SODIUM CHLORIDE 2 G: 1 TABLET ORAL at 17:41

## 2020-11-15 RX ADMIN — ACETAMINOPHEN 650 MG: 325 TABLET ORAL at 11:16

## 2020-11-15 RX ADMIN — THIAMINE HCL TAB 100 MG 100 MG: 100 TAB at 08:31

## 2020-11-15 RX ADMIN — ENOXAPARIN SODIUM 40 MG: 100 INJECTION SUBCUTANEOUS at 08:29

## 2020-11-15 RX ADMIN — SODIUM CHLORIDE 2 G: 1 TABLET ORAL at 08:32

## 2020-11-15 RX ADMIN — MELATONIN TAB 3 MG 6 MG: 3 TAB at 22:18

## 2020-11-15 RX ADMIN — ACETAMINOPHEN 650 MG: 325 TABLET ORAL at 22:18

## 2020-11-15 RX ADMIN — ACETAMINOPHEN 650 MG: 325 TABLET ORAL at 04:17

## 2020-11-15 RX ADMIN — FOLIC ACID 1 MG: 1 TABLET ORAL at 08:30

## 2020-11-15 RX ADMIN — CEFEPIME HYDROCHLORIDE 2000 MG: 2 INJECTION, SOLUTION INTRAVENOUS at 22:18

## 2020-11-15 RX ADMIN — FAMOTIDINE 20 MG: 20 TABLET, FILM COATED ORAL at 08:29

## 2020-11-15 RX ADMIN — CEFEPIME HYDROCHLORIDE 2000 MG: 2 INJECTION, SOLUTION INTRAVENOUS at 10:57

## 2020-11-15 RX ADMIN — MIRTAZAPINE 7.5 MG: 15 TABLET, FILM COATED ORAL at 22:18

## 2020-11-15 RX ADMIN — LAMOTRIGINE 200 MG: 100 TABLET ORAL at 17:42

## 2020-11-15 RX ADMIN — LEVETIRACETAM 1500 MG: 500 TABLET, FILM COATED ORAL at 08:31

## 2020-11-15 RX ADMIN — FERROUS SULFATE TAB 325 MG (65 MG ELEMENTAL FE) 325 MG: 325 (65 FE) TAB at 08:30

## 2020-11-15 RX ADMIN — DICLOFENAC SODIUM 2 G: 10 GEL TOPICAL at 20:28

## 2020-11-15 RX ADMIN — PANTOPRAZOLE SODIUM 40 MG: 40 TABLET, DELAYED RELEASE ORAL at 05:12

## 2020-11-15 RX ADMIN — OXYBUTYNIN CHLORIDE 5 MG: 5 TABLET ORAL at 08:31

## 2020-11-15 RX ADMIN — SODIUM CHLORIDE 2 G: 1 TABLET ORAL at 12:32

## 2020-11-15 RX ADMIN — LEVETIRACETAM 1500 MG: 500 TABLET, FILM COATED ORAL at 17:41

## 2020-11-15 RX ADMIN — LAMOTRIGINE 200 MG: 100 TABLET ORAL at 08:30

## 2020-11-16 PROBLEM — N10 ACUTE PYELONEPHRITIS: Status: ACTIVE | Noted: 2020-11-13

## 2020-11-16 PROBLEM — R00.0 SINUS TACHYCARDIA: Status: RESOLVED | Noted: 2020-11-15 | Resolved: 2020-11-16

## 2020-11-16 LAB
ANION GAP SERPL CALCULATED.3IONS-SCNC: 11 MMOL/L (ref 4–13)
BACTERIA UR CULT: ABNORMAL
BASOPHILS # BLD AUTO: 0.03 THOUSANDS/ΜL (ref 0–0.1)
BASOPHILS NFR BLD AUTO: 1 % (ref 0–1)
BUN SERPL-MCNC: 11 MG/DL (ref 5–25)
CALCIUM SERPL-MCNC: 9 MG/DL (ref 8.3–10.1)
CHLORIDE SERPL-SCNC: 95 MMOL/L (ref 100–108)
CO2 SERPL-SCNC: 21 MMOL/L (ref 21–32)
CREAT SERPL-MCNC: 0.6 MG/DL (ref 0.6–1.3)
EOSINOPHIL # BLD AUTO: 0.17 THOUSAND/ΜL (ref 0–0.61)
EOSINOPHIL NFR BLD AUTO: 3 % (ref 0–6)
ERYTHROCYTE [DISTWIDTH] IN BLOOD BY AUTOMATED COUNT: 12.6 % (ref 11.6–15.1)
GFR SERPL CREATININE-BSD FRML MDRD: 113 ML/MIN/1.73SQ M
GLUCOSE SERPL-MCNC: 90 MG/DL (ref 65–140)
HCT VFR BLD AUTO: 35.6 % (ref 36.5–49.3)
HGB BLD-MCNC: 12.2 G/DL (ref 12–17)
IMM GRANULOCYTES # BLD AUTO: 0.03 THOUSAND/UL (ref 0–0.2)
IMM GRANULOCYTES NFR BLD AUTO: 1 % (ref 0–2)
LYMPHOCYTES # BLD AUTO: 1.13 THOUSANDS/ΜL (ref 0.6–4.47)
LYMPHOCYTES NFR BLD AUTO: 20 % (ref 14–44)
MCH RBC QN AUTO: 31.9 PG (ref 26.8–34.3)
MCHC RBC AUTO-ENTMCNC: 34.3 G/DL (ref 31.4–37.4)
MCV RBC AUTO: 93 FL (ref 82–98)
MONOCYTES # BLD AUTO: 1.05 THOUSAND/ΜL (ref 0.17–1.22)
MONOCYTES NFR BLD AUTO: 19 % (ref 4–12)
NEUTROPHILS # BLD AUTO: 3.28 THOUSANDS/ΜL (ref 1.85–7.62)
NEUTS SEG NFR BLD AUTO: 56 % (ref 43–75)
NRBC BLD AUTO-RTO: 0 /100 WBCS
PLATELET # BLD AUTO: 192 THOUSANDS/UL (ref 149–390)
PMV BLD AUTO: 10.2 FL (ref 8.9–12.7)
POTASSIUM SERPL-SCNC: 4.6 MMOL/L (ref 3.5–5.3)
RBC # BLD AUTO: 3.82 MILLION/UL (ref 3.88–5.62)
SODIUM SERPL-SCNC: 127 MMOL/L (ref 136–145)
WBC # BLD AUTO: 5.69 THOUSAND/UL (ref 4.31–10.16)

## 2020-11-16 PROCEDURE — 80048 BASIC METABOLIC PNL TOTAL CA: CPT | Performed by: NURSE PRACTITIONER

## 2020-11-16 PROCEDURE — 85025 COMPLETE CBC W/AUTO DIFF WBC: CPT | Performed by: PHYSICIAN ASSISTANT

## 2020-11-16 PROCEDURE — 99232 SBSQ HOSP IP/OBS MODERATE 35: CPT | Performed by: PHYSICIAN ASSISTANT

## 2020-11-16 PROCEDURE — 99232 SBSQ HOSP IP/OBS MODERATE 35: CPT | Performed by: INTERNAL MEDICINE

## 2020-11-16 RX ORDER — SODIUM CHLORIDE 1000 MG
3 TABLET, SOLUBLE MISCELLANEOUS
Status: DISCONTINUED | OUTPATIENT
Start: 2020-11-16 | End: 2020-11-17 | Stop reason: HOSPADM

## 2020-11-16 RX ORDER — FUROSEMIDE 20 MG/1
10 TABLET ORAL DAILY
Status: DISCONTINUED | OUTPATIENT
Start: 2020-11-16 | End: 2020-11-17 | Stop reason: HOSPADM

## 2020-11-16 RX ORDER — ALBUMIN (HUMAN) 12.5 G/50ML
12.5 SOLUTION INTRAVENOUS ONCE
Status: COMPLETED | OUTPATIENT
Start: 2020-11-16 | End: 2020-11-16

## 2020-11-16 RX ADMIN — FERROUS SULFATE TAB 325 MG (65 MG ELEMENTAL FE) 325 MG: 325 (65 FE) TAB at 09:23

## 2020-11-16 RX ADMIN — LAMOTRIGINE 200 MG: 100 TABLET ORAL at 17:43

## 2020-11-16 RX ADMIN — ACETAMINOPHEN 650 MG: 325 TABLET ORAL at 09:29

## 2020-11-16 RX ADMIN — CEFEPIME HYDROCHLORIDE 2000 MG: 2 INJECTION, SOLUTION INTRAVENOUS at 22:11

## 2020-11-16 RX ADMIN — ALBUMIN (HUMAN) 12.5 G: 0.25 INJECTION, SOLUTION INTRAVENOUS at 17:43

## 2020-11-16 RX ADMIN — CEFEPIME HYDROCHLORIDE 2000 MG: 2 INJECTION, SOLUTION INTRAVENOUS at 11:45

## 2020-11-16 RX ADMIN — ASPIRIN 81 MG CHEWABLE TABLET 81 MG: 81 TABLET CHEWABLE at 09:24

## 2020-11-16 RX ADMIN — DICLOFENAC SODIUM 2 G: 10 GEL TOPICAL at 20:55

## 2020-11-16 RX ADMIN — MELATONIN TAB 3 MG 6 MG: 3 TAB at 21:00

## 2020-11-16 RX ADMIN — DOCUSATE SODIUM 100 MG: 100 CAPSULE ORAL at 20:55

## 2020-11-16 RX ADMIN — MIRTAZAPINE 7.5 MG: 15 TABLET, FILM COATED ORAL at 21:00

## 2020-11-16 RX ADMIN — LEVETIRACETAM 1500 MG: 500 TABLET, FILM COATED ORAL at 17:43

## 2020-11-16 RX ADMIN — LAMOTRIGINE 200 MG: 100 TABLET ORAL at 09:23

## 2020-11-16 RX ADMIN — THIAMINE HCL TAB 100 MG 100 MG: 100 TAB at 09:23

## 2020-11-16 RX ADMIN — FAMOTIDINE 20 MG: 20 TABLET, FILM COATED ORAL at 09:24

## 2020-11-16 RX ADMIN — SODIUM CHLORIDE 3 G: 1 TABLET ORAL at 17:45

## 2020-11-16 RX ADMIN — FOLIC ACID 1 MG: 1 TABLET ORAL at 09:24

## 2020-11-16 RX ADMIN — OXYBUTYNIN CHLORIDE 5 MG: 5 TABLET ORAL at 09:23

## 2020-11-16 RX ADMIN — LEVETIRACETAM 1500 MG: 500 TABLET, FILM COATED ORAL at 09:24

## 2020-11-16 RX ADMIN — SODIUM CHLORIDE 2 G: 1 TABLET ORAL at 11:45

## 2020-11-16 RX ADMIN — SODIUM CHLORIDE 2 G: 1 TABLET ORAL at 09:29

## 2020-11-16 RX ADMIN — ENOXAPARIN SODIUM 40 MG: 100 INJECTION SUBCUTANEOUS at 09:24

## 2020-11-16 RX ADMIN — ACETAMINOPHEN 650 MG: 325 TABLET ORAL at 20:55

## 2020-11-16 RX ADMIN — FUROSEMIDE 10 MG: 20 TABLET ORAL at 11:45

## 2020-11-17 ENCOUNTER — TELEPHONE (OUTPATIENT)
Dept: NEPHROLOGY | Facility: CLINIC | Age: 55
End: 2020-11-17

## 2020-11-17 ENCOUNTER — TRANSCRIBE ORDERS (OUTPATIENT)
Dept: NEUROLOGY | Facility: CLINIC | Age: 55
End: 2020-11-17

## 2020-11-17 VITALS
HEIGHT: 60 IN | SYSTOLIC BLOOD PRESSURE: 118 MMHG | DIASTOLIC BLOOD PRESSURE: 97 MMHG | RESPIRATION RATE: 18 BRPM | TEMPERATURE: 97.6 F | BODY MASS INDEX: 29.61 KG/M2 | OXYGEN SATURATION: 99 % | HEART RATE: 79 BPM | WEIGHT: 150.79 LBS

## 2020-11-17 DIAGNOSIS — G40.201 LOCALIZATION-RELATED (FOCAL) (PARTIAL) SYMPTOMATIC EPILEPSY AND EPILEPTIC SYNDROMES WITH COMPLEX PARTIAL SEIZURES, NOT INTRACTABLE, WITH STATUS EPILEPTICUS (HCC): Primary | ICD-10-CM

## 2020-11-17 PROBLEM — A41.9 SEPSIS (HCC): Status: RESOLVED | Noted: 2020-11-14 | Resolved: 2020-11-17

## 2020-11-17 LAB
ANION GAP SERPL CALCULATED.3IONS-SCNC: 12 MMOL/L (ref 4–13)
BUN SERPL-MCNC: 17 MG/DL (ref 5–25)
CALCIUM SERPL-MCNC: 9.2 MG/DL (ref 8.3–10.1)
CHLORIDE SERPL-SCNC: 100 MMOL/L (ref 100–108)
CO2 SERPL-SCNC: 22 MMOL/L (ref 21–32)
CREAT SERPL-MCNC: 0.77 MG/DL (ref 0.6–1.3)
FLUAV RNA RESP QL NAA+PROBE: NEGATIVE
FLUBV RNA RESP QL NAA+PROBE: NEGATIVE
GFR SERPL CREATININE-BSD FRML MDRD: 102 ML/MIN/1.73SQ M
GLUCOSE SERPL-MCNC: 88 MG/DL (ref 65–140)
POTASSIUM SERPL-SCNC: 4.4 MMOL/L (ref 3.5–5.3)
RSV RNA RESP QL NAA+PROBE: NEGATIVE
SARS-COV-2 RNA RESP QL NAA+PROBE: NEGATIVE
SODIUM SERPL-SCNC: 134 MMOL/L (ref 136–145)

## 2020-11-17 PROCEDURE — 80048 BASIC METABOLIC PNL TOTAL CA: CPT | Performed by: NURSE PRACTITIONER

## 2020-11-17 PROCEDURE — 99239 HOSP IP/OBS DSCHRG MGMT >30: CPT | Performed by: PHYSICIAN ASSISTANT

## 2020-11-17 PROCEDURE — 0241U HB NFCT DS VIR RESP RNA 4 TRGT: CPT | Performed by: PHYSICIAN ASSISTANT

## 2020-11-17 PROCEDURE — 99232 SBSQ HOSP IP/OBS MODERATE 35: CPT | Performed by: INTERNAL MEDICINE

## 2020-11-17 RX ORDER — CEFDINIR 300 MG/1
300 CAPSULE ORAL EVERY 12 HOURS SCHEDULED
Qty: 20 CAPSULE | Refills: 0
Start: 2020-11-17 | End: 2020-11-27

## 2020-11-17 RX ORDER — SODIUM CHLORIDE 1000 MG
3 TABLET, SOLUBLE MISCELLANEOUS
Refills: 0
Start: 2020-11-17

## 2020-11-17 RX ORDER — FAMOTIDINE 20 MG/1
20 TABLET, FILM COATED ORAL DAILY
Refills: 0
Start: 2020-11-18

## 2020-11-17 RX ADMIN — SODIUM CHLORIDE 3 G: 1 TABLET ORAL at 11:33

## 2020-11-17 RX ADMIN — FUROSEMIDE 10 MG: 20 TABLET ORAL at 08:30

## 2020-11-17 RX ADMIN — FERROUS SULFATE TAB 325 MG (65 MG ELEMENTAL FE) 325 MG: 325 (65 FE) TAB at 08:30

## 2020-11-17 RX ADMIN — ENOXAPARIN SODIUM 40 MG: 100 INJECTION SUBCUTANEOUS at 08:31

## 2020-11-17 RX ADMIN — LAMOTRIGINE 200 MG: 100 TABLET ORAL at 08:30

## 2020-11-17 RX ADMIN — ASPIRIN 81 MG CHEWABLE TABLET 81 MG: 81 TABLET CHEWABLE at 08:30

## 2020-11-17 RX ADMIN — ACETAMINOPHEN 650 MG: 325 TABLET ORAL at 03:06

## 2020-11-17 RX ADMIN — FAMOTIDINE 20 MG: 20 TABLET, FILM COATED ORAL at 08:31

## 2020-11-17 RX ADMIN — ACETAMINOPHEN 650 MG: 325 TABLET ORAL at 09:26

## 2020-11-17 RX ADMIN — OXYBUTYNIN CHLORIDE 5 MG: 5 TABLET ORAL at 08:31

## 2020-11-17 RX ADMIN — LEVETIRACETAM 1500 MG: 500 TABLET, FILM COATED ORAL at 08:30

## 2020-11-17 RX ADMIN — SODIUM CHLORIDE 3 G: 1 TABLET ORAL at 08:30

## 2020-11-17 RX ADMIN — THIAMINE HCL TAB 100 MG 100 MG: 100 TAB at 08:31

## 2020-11-17 RX ADMIN — CEFEPIME HYDROCHLORIDE 2000 MG: 2 INJECTION, SOLUTION INTRAVENOUS at 11:33

## 2020-11-17 RX ADMIN — FOLIC ACID 1 MG: 1 TABLET ORAL at 08:30

## 2020-11-18 LAB
HCV RNA SERPL NAA+PROBE-ACNC: NORMAL IU/ML
TEST INFORMATION: NORMAL

## 2020-11-19 ENCOUNTER — DOCUMENTATION (OUTPATIENT)
Dept: NEPHROLOGY | Facility: CLINIC | Age: 55
End: 2020-11-19

## 2020-11-19 LAB
BACTERIA BLD CULT: NORMAL
BACTERIA BLD CULT: NORMAL
EXT GLUCOSE BLD: 92
EXTERNAL ANION GAP: 12.4
EXTERNAL BUN: 12
EXTERNAL CALCIUM: 9.3
EXTERNAL CHLORIDE: 97
EXTERNAL CO2: 29
EXTERNAL CREATININE: 0.73
EXTERNAL EGFR: 112
EXTERNAL POTASSIUM: 4.4
EXTERNAL SODIUM: 134

## 2020-11-24 ENCOUNTER — TELEMEDICINE (OUTPATIENT)
Dept: NEPHROLOGY | Facility: HOSPITAL | Age: 55
End: 2020-11-24
Payer: COMMERCIAL

## 2020-11-24 DIAGNOSIS — N10 ACUTE PYELONEPHRITIS: ICD-10-CM

## 2020-11-24 DIAGNOSIS — I10 ESSENTIAL HYPERTENSION: Primary | ICD-10-CM

## 2020-11-24 DIAGNOSIS — E87.1 HYPONATREMIA: ICD-10-CM

## 2020-11-24 PROCEDURE — G2012 BRIEF CHECK IN BY MD/QHP: HCPCS | Performed by: NURSE PRACTITIONER

## 2021-09-03 ENCOUNTER — TELEPHONE (OUTPATIENT)
Dept: NEUROLOGY | Facility: CLINIC | Age: 56
End: 2021-09-03

## 2021-09-03 NOTE — TELEPHONE ENCOUNTER
Called pts brother loretta and left voicemail  I left my name and number to call back to confirm this appt

## 2021-09-07 ENCOUNTER — OFFICE VISIT (OUTPATIENT)
Dept: NEUROLOGY | Facility: CLINIC | Age: 56
End: 2021-09-07
Payer: COMMERCIAL

## 2021-09-07 VITALS
RESPIRATION RATE: 18 BRPM | HEART RATE: 75 BPM | TEMPERATURE: 97.6 F | DIASTOLIC BLOOD PRESSURE: 78 MMHG | SYSTOLIC BLOOD PRESSURE: 122 MMHG

## 2021-09-07 DIAGNOSIS — G40.201 LOCALIZATION-RELATED (FOCAL) (PARTIAL) SYMPTOMATIC EPILEPSY AND EPILEPTIC SYNDROMES WITH COMPLEX PARTIAL SEIZURES, NOT INTRACTABLE, WITH STATUS EPILEPTICUS (HCC): ICD-10-CM

## 2021-09-07 DIAGNOSIS — G40.201 PARTIAL SYMPTOMATIC EPILEPSY WITH COMPLEX PARTIAL SEIZURES, NOT INTRACTABLE, WITH STATUS EPILEPTICUS (HCC): Primary | ICD-10-CM

## 2021-09-07 PROCEDURE — 99214 OFFICE O/P EST MOD 30 MIN: CPT | Performed by: PHYSICIAN ASSISTANT

## 2021-09-07 RX ORDER — LEVETIRACETAM 250 MG/1
250 TABLET ORAL 2 TIMES DAILY
Start: 2021-09-07

## 2021-09-07 NOTE — PATIENT INSTRUCTIONS
Per patient and facility, he had a seziure last week on 9/1  Patient unsure if this was his "typical"   seizure  Possibly had just an aura based on description? Per patient and facility, no infection  No med changes made  He is currently on Keppra 1000mg BID and Lamictal 200mg BID  He was previously on higher doses of Keppra up to 1500mg BID  He believes med was lowered due to a "high" level  Level taken last week was normal per verbal given by facility  Discussed with patient that we do not just treat the level  Some patients may require a higher dose of medication and a higher serum level in order to keep them seizure-free  As long as no side effects, then it is ok for the level to be "high" if it is controlling seizures  We discussed increasing the Keppra slightly again and rechecking labs in 2-3 weeks      Plan:  -increase Keppra (levetiracetam) to 1250mg BID (1000mg tab + a 250mg tab twice a day)  -continue lamotrigine 200mg twice a day  -check labs in 2-3 weeks (CBC, CMP, levetiracetam level and lamotrigine level) and make sure to fax results to [phone]  -call our office if he has any further breakthrough seizures  -return to see Dr Hall Meckel in 3 months
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
## 2022-01-14 ENCOUNTER — TELEPHONE (OUTPATIENT)
Dept: NEUROLOGY | Facility: CLINIC | Age: 57
End: 2022-01-14

## 2022-01-19 ENCOUNTER — TELEPHONE (OUTPATIENT)
Dept: NEUROLOGY | Facility: CLINIC | Age: 57
End: 2022-01-19

## 2022-01-19 NOTE — TELEPHONE ENCOUNTER
[address] [place] [address] and spoke to , she informed me that the patient refused to come to the apt  She transferred me to the  [facility] - where I left a voicemail to call back and reschedule missed apt with Dr Mitul Mcknight

## 2022-02-17 ENCOUNTER — TELEPHONE (OUTPATIENT)
Dept: NEUROLOGY | Facility: CLINIC | Age: 57
End: 2022-02-17

## 2022-02-17 NOTE — TELEPHONE ENCOUNTER
Patient called to schedule an appointment  She's NO SHOW on her previous appointment last 01/19/2022 for the reason that she's sick  Scheduled her on 03/23/2022 @ 3pm with Evelyne Khan in [facility]

## 2022-03-11 ENCOUNTER — TELEPHONE (OUTPATIENT)
Dept: NEUROLOGY | Facility: CLINIC | Age: 57
End: 2022-03-11

## 2022-03-11 NOTE — TELEPHONE ENCOUNTER
Lm on number in chart, stating pt name and location/date/and time of apt  Asked them to call back to confirm or cancel apt if need be

## 2022-03-23 ENCOUNTER — OFFICE VISIT (OUTPATIENT)
Dept: NEUROLOGY | Facility: CLINIC | Age: 57
End: 2022-03-23
Payer: COMMERCIAL

## 2022-03-23 VITALS
SYSTOLIC BLOOD PRESSURE: 143 MMHG | BODY MASS INDEX: 29.45 KG/M2 | WEIGHT: 150 LBS | HEART RATE: 81 BPM | HEIGHT: 60 IN | DIASTOLIC BLOOD PRESSURE: 85 MMHG

## 2022-03-23 DIAGNOSIS — G40.201 LOCALIZATION-RELATED (FOCAL) (PARTIAL) SYMPTOMATIC EPILEPSY AND EPILEPTIC SYNDROMES WITH COMPLEX PARTIAL SEIZURES, NOT INTRACTABLE, WITH STATUS EPILEPTICUS (HCC): ICD-10-CM

## 2022-03-23 PROCEDURE — 99215 OFFICE O/P EST HI 40 MIN: CPT | Performed by: NURSE PRACTITIONER

## 2022-03-23 RX ORDER — OXYBUTYNIN CHLORIDE 5 MG/1
TABLET, EXTENDED RELEASE ORAL
COMMUNITY
Start: 2022-03-16

## 2022-03-23 NOTE — PROGRESS NOTES
Patient ID: Tahir Dowd is a 62 y o  male with epilepsy, likely due to right hemisphere encephalomalacia (unclear if post-traumatic or from stroke), who presents for follow up of his seizures  Assessment/Plan:    Localization-related (focal) (partial) symptomatic epilepsy and epileptic syndromes with complex partial seizures, not intractable, with status epilepticus ([facility] [address] )  Patient with epilepsy, likely secondary to right hemisphere encephalomalacia of unclear etiology  He is currently on levetiracetam 1250 mg BID and lamotrigine 200 mg BID without side effects or concerns  He denies any recent seizures which was confirmed by nurse at facility were he currently resides  Recent levetiracetam level 33 3 and lamotrigine level 4 1 per nurse at facility  Recommend that labs are sent with patient to follow up appointments  He does have chronic hyponatremia (130 with recent labs), being treated with salt tabs  This is not related to any of his current AEDs  Patient will continue with current AEDs as above  With breakthrough seizures, his dose of levetiracetam could be increased  He did tolerate increase after last visit well without issue or concern  Follow up in 3 months or sooner if needed with Dr Simón Mora  Recommended CMP, CBC w platelet, lamotrigine level, and levetiracetam level prior to follow up and printed for review at next appointment  He will Return for 3 months with Dr Simón Mora  Subjective:  Tahir Dowd is a 62 y o  male with epilepsy, likely due to right hemisphere encephalomalacia (unclear if post-traumatic or from stroke), who presents for follow up of his seizures  He was last seen in the office by Vanessa Dick PA-C on 9/7/2021  At that time, there were noted breakthrough seizures since prior visits and hospitalizations at [facility]  Seizures seemed to have occurred in the setting of missed medications or sepsis so several medication adjustments had been made   There was a noted seizure the week prior to his visit with Brittaney Carmona without provoking factors  Recommended increasing his levetiracetam to 1250 mg BID and continue with lamotrigine 200 mg BID  Recommended labs prior to follow up and return to see Dr Dianne Salcedo in 3 months  Patient was a no show to that appointment and was rescheduled today with this writer  The patient presents to his appointment today by himself  He denies any recent seizures  He does have paperwork with him from the facility where he currently resides [facility])  Called to speak with nursing home staff regarding his labs as results were not sent from facility  The nurse also reports that he has been doing well  No noted seizures or concerns  Feel that he has been doing well  Since their last visit, he is back up on the higher dose of levetiracetam 1250 mg BID and lamotrigine 200 mg BID  Feels well on the higher dose of levetiracetam      Report that he smokes 2 cigarettes per day  Encouraged smoking cessation  Current seizure medications:  - levetiracetam 1250 mg BID  - lamotrigine 200 mg BID  Other medications as per Epic  3/17 per nurse at facility - keppra 33 3, lamictal 4 1  BMP- Na130 (on Na tabs), Cl97, K4 5, creat 0 86  CBC- Hct 39 5, MCH 32 7, no platelet count  Prior AEDs:  Patient is not sure, but per prior notes, he was treated with Phenytoin and Carbamazepine     Prior Evaluation:  - CT head 2019: old infarctions with encephalomalacia on the right  Diffuse volume loss  - MRI brain: none available  - Routine EE2019: almost continuous right temporal slowing  - Video EEG: none  - Labs: Levetiracetam level on 2019: 41 1       I reviewed prior neurology notes, recent labs, imaging reports, as [facility], and summarized above  Objective:    Blood pressure 143/85, pulse 81, height 4' 5 5" (1 359 m), weight 68 kg (150 lb)  Physical Exam  No apparent distress  Appears well nourished     Mood appropriate for situation    Nicotine staining right 1st digit and thumb     Neurologic Exam  Mental status- alert and oriented to person, place, and time  Speech appropriate for conversation  Good attention and knowledge  Cranial Nerves- PERRL, EOMS normal, facial sensation and muscles symmetric, hard of hearing, tongue midline, palate rise symmetrical, shoulder shrug symmetrical     Motor- No pronator drift  Appropriate strength  Moves all extremities freely  No tremor  Sensory-  Intact distally in all extremities to light touch  DTRs- 2+ and symmetric in bilateral upper extremities  Gait- Seated in wheelchair  BKA left, AKA right    Coordination- FNF intact  ROS:    Review of Systems   Constitutional: Negative  Negative for appetite change and fever  HENT: Negative  Negative for hearing loss, tinnitus, trouble swallowing and voice change  Eyes: Negative  Negative for photophobia and pain  Respiratory: Negative  Negative for shortness of breath  Cardiovascular: Negative  Negative for palpitations  Gastrointestinal: Negative  Negative for nausea and vomiting  Endocrine: Negative  Negative for cold intolerance  Genitourinary: Negative  Negative for dysuria, frequency and urgency  Musculoskeletal: Negative  Negative for myalgias and neck pain  Skin: Negative  Negative for rash  Neurological: Negative  Negative for dizziness, tremors, seizures, syncope, facial asymmetry, speech difficulty, weakness, light-headedness, numbness and headaches  Hematological: Negative  Does not bruise/bleed easily  Psychiatric/Behavioral: Negative  Negative for confusion, hallucinations and sleep disturbance  All other systems reviewed and are negative        ROS obtained by MA and reviewed by myself  This note may have been created using voice recognition software  There may be unintentional errors such as grammatical errors, spelling errors, or pronoun errors

## 2022-03-23 NOTE — PATIENT INSTRUCTIONS
- Continue current medications  - Please send printout of blood work to next follow up - CMP, CBC w platelet, lamotrigine level, and levetiracetam level  - Call the office with any breakthrough seizures or concerns - please call our office prior to seizure medication changes  - Follow up in 3 months with Dr Santosh Price or sooner if needed

## 2022-03-31 PROBLEM — G40.201: Status: ACTIVE | Noted: 2019-10-01

## 2022-03-31 NOTE — ASSESSMENT & PLAN NOTE
Patient with epilepsy, likely secondary to right hemisphere encephalomalacia of unclear etiology  He is currently on levetiracetam 1250 mg BID and lamotrigine 200 mg BID without side effects or concerns  He denies any recent seizures which was confirmed by nurse at facility were he currently resides  Recent levetiracetam level 33 3 and lamotrigine level 4 1 per nurse at facility  Recommend that labs are sent with patient to follow up appointments  He does have chronic hyponatremia (130 with recent labs), being treated with salt tabs  This is not related to any of his current AEDs  Patient will continue with current AEDs as above  With breakthrough seizures, his dose of levetiracetam could be increased  He did tolerate increase after last visit well without issue or concern  Follow up in 3 months or sooner if needed with Dr Marcela Hensley  Recommended CMP, CBC w platelet, lamotrigine level, and levetiracetam level prior to follow up and printed for review at next appointment

## 2022-08-29 ENCOUNTER — TELEPHONE (OUTPATIENT)
Dept: NEUROLOGY | Facility: CLINIC | Age: 57
End: 2022-08-29

## 2022-08-29 NOTE — TELEPHONE ENCOUNTER
Tana Mills called to reschedule this patient's lasted missed appointment I offered her 11-29-22 at 21  am and added him to the wait list and she accepted

## 2022-09-20 ENCOUNTER — TELEPHONE (OUTPATIENT)
Dept: NEPHROLOGY | Facility: CLINIC | Age: 57
End: 2022-09-20

## 2022-09-20 NOTE — TELEPHONE ENCOUNTER
Spoke with Meme Mask with [facility] to schedule an appt  Meme Mask stated patient has low sodium and refusing urea packet  Meme Mask stated she will fax over the order

## 2022-10-07 ENCOUNTER — TELEPHONE (OUTPATIENT)
Dept: NEPHROLOGY | Facility: CLINIC | Age: 57
End: 2022-10-07

## 2022-10-07 NOTE — TELEPHONE ENCOUNTER
Receive a call from [facility] they needed to scheduled an appointment for the patient I stated that the patient has an appointment

## 2022-10-20 ENCOUNTER — TELEPHONE (OUTPATIENT)
Dept: UROLOGY | Facility: MEDICAL CENTER | Age: 57
End: 2022-10-20

## 2022-10-20 NOTE — TELEPHONE ENCOUNTER
LM for Miranda Galindo that I had Dr Nila Lee review imaging  He fells the appointment on Wednesday appropriate but that he also needs a Cysto   He is tentatively scheduled in [place] with Dr Elvi Lockwood for 11/8 @ [phone]

## 2022-10-20 NOTE — TELEPHONE ENCOUNTER
[facility] for the patient  Informed Aquiles Addison that the patient is currently scheduled on 10/26/22 in [facility]  Aquiles Addison wanting to know if we have anything sooner       Please advise, did not see anything for NP before 10/26/22    She is requesting a call back at [phone] ext [phone]

## 2022-10-20 NOTE — TELEPHONE ENCOUNTER
Please Triage New Patient    What is the reason for the patients appointment? Nahomi Rosales calling from [address] home calling in to make an appointment for resident  He had an US done and refused to go the ED  She also said the appointment is for hematuria  She is unsure if the blood is visible or not  Imaging/Lab Results: US not in [place] - she has no idea when the US was done - she said "serve complicated right hydronephrosis"    Do we accept the pt's insurance or is the patient Self-Pay? [employer]    Has the pt had any previous urologist? No    Patient's preferred office: [facility]     Patients best call back number: [phone] - she said if you have questions for the unit manager John Santana  She is getting the imaging faxed over and notes

## 2022-10-24 ENCOUNTER — TELEPHONE (OUTPATIENT)
Dept: NEPHROLOGY | Facility: CLINIC | Age: 57
End: 2022-10-24

## 2022-10-24 DIAGNOSIS — E87.6 HYPOKALEMIA: Primary | ICD-10-CM

## 2022-10-31 ENCOUNTER — TELEPHONE (OUTPATIENT)
Dept: NEPHROLOGY | Facility: CLINIC | Age: 57
End: 2022-10-31

## 2022-10-31 NOTE — TELEPHONE ENCOUNTER
Appointment Confirmation   Person confirmed appointment with  If not patient, name of the person Answering Machine    Date and time of appointment 11/1/22  4:00 pm   Patient acknowledged and will be at appointment? no    Did you advise the patient that they will need a urine sample if they are a new patient?  N/A    Did you advise the patient to bring their current medications for verification? (including any OTC) Yes    Additional Information

## 2022-11-01 ENCOUNTER — TELEPHONE (OUTPATIENT)
Dept: NEPHROLOGY | Facility: CLINIC | Age: 57
End: 2022-11-01

## 2022-11-01 NOTE — TELEPHONE ENCOUNTER
Received a called from nurse Esdras Asencio in [facility] stating that Dr Fiona Forrester spoke to Cayla Jacob a nurse practitioner their and change the  patient appointment for 11/1/22 4:00 to 11/2/22 at 1:00  I inform her that I did not see the changes for the appointment on 11/2/22 in our system

## 2022-11-02 PROBLEM — R33.9 URINARY RETENTION: Status: ACTIVE | Noted: 2022-11-02

## 2022-11-08 ENCOUNTER — PROCEDURE VISIT (OUTPATIENT)
Dept: UROLOGY | Facility: AMBULATORY SURGERY CENTER | Age: 57
End: 2022-11-08

## 2022-11-08 VITALS
RESPIRATION RATE: 18 BRPM | SYSTOLIC BLOOD PRESSURE: 130 MMHG | HEART RATE: 79 BPM | DIASTOLIC BLOOD PRESSURE: 80 MMHG | OXYGEN SATURATION: 99 %

## 2022-11-08 DIAGNOSIS — R31.0 GROSS HEMATURIA: Primary | ICD-10-CM

## 2022-11-08 RX ORDER — CIPROFLOXACIN 500 MG/1
TABLET, FILM COATED ORAL
COMMUNITY
Start: 2022-10-28
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
## 2022-11-10 ENCOUNTER — TELEPHONE (OUTPATIENT)
Dept: OTHER | Facility: OTHER | Age: 57
End: 2022-11-10

## 2022-11-10 NOTE — TELEPHONE ENCOUNTER
Per Mai's phone call, patient had the void trial catheter removed  Patient is having maximiliano blood in his urinal and is refusing to have a catheter inserted

## 2022-11-11 NOTE — TELEPHONE ENCOUNTER
Called and spoke with Kathryn  Patient is voiding fine and not complaining of any lower abdominal discomfort  He has some specks of blood in the urinal  Advised that it is not uncommon after young removal to have some blood  Advised on hydration as much as possible  Patient is on a fluid restriction  Offered PVR in the office to ensure he is emptying effectively, but patient does not like to travel per [facility]  They will monitor his urinary patterns and amounts and if there is a concern, they will cath him

## 2022-12-12 ENCOUNTER — OFFICE VISIT (OUTPATIENT)
Dept: UROLOGY | Facility: AMBULATORY SURGERY CENTER | Age: 57
End: 2022-12-12

## 2022-12-12 VITALS — OXYGEN SATURATION: 91 % | RESPIRATION RATE: 16 BRPM | HEART RATE: 85 BPM

## 2022-12-12 DIAGNOSIS — R33.8 URINARY RETENTION DUE TO BENIGN PROSTATIC HYPERPLASIA: Primary | ICD-10-CM

## 2022-12-12 DIAGNOSIS — N40.1 URINARY RETENTION DUE TO BENIGN PROSTATIC HYPERPLASIA: Primary | ICD-10-CM

## 2022-12-12 RX ORDER — TAMSULOSIN HYDROCHLORIDE 0.4 MG/1
0.4 CAPSULE ORAL
Qty: 90 CAPSULE | Refills: 3 | Status: SHIPPED | OUTPATIENT
Start: 2022-12-12

## 2022-12-12 NOTE — PROGRESS NOTES
12/12/22    Trevor Waldrop   1965   [id number]     Assessment  1 Urinary retention   2 Pyelonephritis/UTI  (2020, 2022)    Discussion/Plan  1 Urinary retention   Void trial at facility    Begin Tamsulosin 0 4 mg once daily   Bowel regimen: Colace, dulcolax daily    Consider SPT for long-term management   2 Pyelonephritis/UTI  (2020, 2022)   11/13/22 Urine culture >100,000 cfu/ml ESBL E  Coli    11/15/22 CT Impression: Moderate bilateral hydroureteronephrosis and bilateral perinephric   stranding may represent UTI/pyelonephritis  Thickened wall of the urinary bladder  No renal stone    Increase hydration with water within fluid restriction   11/25/22 BUN 16, Cr 1 26    Communication form completed for nursing facility  Void trial to be performed at [facility]  They will notify our office of the results  If patient is unable to void, young will be replaced and he states he is open to SPT discussion  All questions answered at this time  Subjective  GUSTAVO   Rakesh Davis is a 62year old male resident of [facility] who presents in follow up for evaluation of urinary retention and pyelonephritis  He was hospitalized in 2020 and most recently November 2022 at [facility] for urosepsis  He originally presented 11/15/22 to [facility] ER with a fever of 103 with tachycardia  Urine studies showed ESBL E Coli and positive blood cultures  Urinalysis in 2020 was positive for Klebsiella  He had cystoscopy 11/8/22 with Dr Alysa Delgado  Procedure did not reveal bladder lesion or abnormalities  He was instructed to return in 4 weeks for PVR, however, patient presents today with indwelling young catheter in place  He has bilateral below the knee amputations and is in a wheelchair  He denies gross hematuria, dysuria, fever, chills, or issues with the young  He is a current smoker  Other history includes: neurogenic bladder, traumatic bilateral lower extremity amputations, epilepsy, and history of substance abuse   Multiple images available in Care Everywhere  He had positive blood cultures which grew Enterobacter and E Coli  He completed 10 days of IV antibiotics  Repeated US showed resolution of hydronephrosis and he was discharged with young and urology follow up  US KIDNEYS/RENAL  11/21/22 (Care Everywhere)  INDICATION: Assess for resolution of hydronephrosis        COMPARISON: Renal ultrasound from 11/18/2022, CT of the abdomen and pelvis from   11/15/2022  CT urogram from 10/25/2022  FINDINGS:     RIGHT KIDNEY:     Size: 8 8 cm in length  Pelvicalyceal dilatation: No evidence of hydronephrosis with a minimally dilated   right ureter seen  Parenchyma: Within normal limits  Calculi: None  Masses: No discrete mass is identified as the measured findings on this study   likely represent cortical parenchymal lobulations as review of the recent CT   studies show no discrete masses or complex cysts  LEFT KIDNEY:     Size: 10 3 cm in length  Pelvicalyceal dilatation: None  Parenchyma: Within normal limits  Calculi: None  Masses: None  URINARY BLADDER:     Collapsed and contains a young catheter  IMPRESSION:     No evidence of hydronephrosis  1  Bilateral mild hydroureteronephrosis with interval decrease in hydronephrosis   left side, no discrete source identified  2  Despite indwelling Young catheter, there is visible mobile debris, air, and   moderate fluid contents in the bladder, more than would be anticipated with   indwelling catheter which is not clamped  Multiple diverticula may contribute to   incomplete emptying  Impression: Moderate bilateral hydroureteronephrosis and bilateral perinephric   stranding may represent UTI/pyelonephritis  Thickened wall of the urinary   bladder  No renal stone          Review of Systems - History obtained from chart review and the patient  General ROS: negative  Psychological ROS: negative  Respiratory ROS: no cough, shortness of breath, or wheezing  Cardiovascular ROS: no chest pain or dyspnea on exertion  Gastrointestinal ROS: no abdominal pain, change in bowel habits, or black or bloody stools  Genito-Urinary ROS: positive for - catheter   Musculoskeletal ROS: negative  Neurological ROS: no TIA or stroke symptoms  Dermatological ROS: negative       Objective   Physical Exam  Vitals and nursing note reviewed  Constitutional:       General: He is awake  He is not in acute distress  Appearance: Normal appearance  He is well-developed, well-groomed and normal weight  He is not ill-appearing, toxic-appearing or diaphoretic  HENT:      Right Ear: Decreased hearing noted  Left Ear: Decreased hearing noted  Pulmonary:      Effort: Pulmonary effort is normal    Musculoskeletal:         General: Normal range of motion  Cervical back: Normal range of motion and neck supple  Right Lower Extremity: Right leg is amputated below knee  Left Lower Extremity: Left leg is amputated below knee  Skin:     General: Skin is warm  Neurological:      General: No focal deficit present  Mental Status: He is alert and oriented to person, place, and time  Mental status is at baseline  Motor: Weakness present  Gait: Gait abnormal    Psychiatric:         Attention and Perception: Attention normal          Mood and Affect: Mood normal  Affect is flat  Speech: Speech normal          Behavior: Behavior normal  Behavior is cooperative  Thought Content: Thought content normal          Cognition and Memory: Cognition is impaired  Judgment: Judgment normal              Lana Fothergill, CRNP     I have spent 15 minutes with Patient  today in which greater than 50% of this time was spent in counseling/coordination of care regarding Diagnostic results, Risks and benefits of tx options, Intructions for management, Patient and family education, Importance of tx compliance and Impressions

## 2022-12-14 ENCOUNTER — TELEPHONE (OUTPATIENT)
Dept: OTHER | Facility: OTHER | Age: 57
End: 2022-12-14

## 2022-12-14 NOTE — TELEPHONE ENCOUNTER
Facility where patient resides is calling to report that the patient was seen 12/12 and they completed the voiding trial as ordered  His catheter was pulled and he did void 100 ml, (the patient is on a  1200cc fluid restriction)  Is 100 ml sufficient?

## 2022-12-15 NOTE — TELEPHONE ENCOUNTER
Was facility able to perform PVR assessment? If results <350 and patient voiding, he may follow up in 3 months for office visit and PVR assessment

## 2022-12-15 NOTE — TELEPHONE ENCOUNTER
I asked if he was urinating ok and if they did a PVR - they said they don't have a PVR - put are monitoring out put- asked if they could straight cath him or notify us on output - will call back tomorrow

## 2023-01-04 ENCOUNTER — TELEPHONE (OUTPATIENT)
Dept: NEUROLOGY | Facility: CLINIC | Age: 58
End: 2023-01-04

## 2023-01-04 NOTE — TELEPHONE ENCOUNTER
Pts assisted living called to make appt that Pt missed due to being in hospital    New appt is 5/17/23 @ 9:00 with Dr Marcos Napier in [facility] added to wait list

## 2023-09-22 ENCOUNTER — OFFICE VISIT (OUTPATIENT)
Dept: NEUROLOGY | Facility: CLINIC | Age: 58
End: 2023-09-22
Payer: COMMERCIAL

## 2023-09-22 VITALS
SYSTOLIC BLOOD PRESSURE: 122 MMHG | DIASTOLIC BLOOD PRESSURE: 80 MMHG | OXYGEN SATURATION: 98 % | HEIGHT: 60 IN | TEMPERATURE: 97.4 F | HEART RATE: 76 BPM | BODY MASS INDEX: 37.54 KG/M2

## 2023-09-22 DIAGNOSIS — G40.201 LOCALIZATION-RELATED (FOCAL) (PARTIAL) SYMPTOMATIC EPILEPSY AND EPILEPTIC SYNDROMES WITH COMPLEX PARTIAL SEIZURES, NOT INTRACTABLE, WITH STATUS EPILEPTICUS (HCC): Primary | ICD-10-CM

## 2023-09-22 PROCEDURE — 99214 OFFICE O/P EST MOD 30 MIN: CPT | Performed by: PSYCHIATRY & NEUROLOGY

## 2023-09-22 RX ORDER — ONDANSETRON 4 MG/1
TABLET, FILM COATED ORAL
COMMUNITY
Start: 2023-09-10

## 2023-09-22 RX ORDER — LEVETIRACETAM 750 MG/1
TABLET ORAL
COMMUNITY
Start: 2023-08-09

## 2023-09-22 RX ORDER — LEVETIRACETAM 500 MG/1
TABLET ORAL
COMMUNITY
Start: 2023-09-20

## 2023-09-22 NOTE — ASSESSMENT & PLAN NOTE
Patient here today in follow up for epilepsy. Was last seen 18 months ago. Epilepsy likely secondary to right hemispheric encephalomalacia. Reports one possible seizure several months ago described as stiffening then shaking though with retained awareness (has had impaired awareness with prior seizures). He has been maintained on Lamotrigine 200 mg BID and Levetiracetam. At last visit Levetiracetam was at 1250 mg BID and in the interim it has been decreased to 500 mg BID. He notes that his dose was held for several days before restarting at lower dose. Unclear timing of this change but level on 7/23/23 was 119.6. While this is elevated, patient had no side effects and no seizures. Doubt that this was a true trough level as prior levels have all been within normal ranges. With regards to plan of care, would recommend increasing Levetiracetam to 1000 mg BID as he has required this dose or higher in the past for seizure freedom. Would like for a trough level to be drawn in 2 weeks (30-60 min before AM dose of Levetiracetam). Have asked for the facility to then call the office so that we can advise if any further dosage changes are needed. As for the Lamotrigine, continue 200 mg twice daily. Recommend updated labs including CBC, CMP, Lamotrigine level and Levetiracetam level to be done about 1 week prior to next appointment and have physical copy brought with him to the office. We will plan on following up with Treovr in approximately 3 months time. Advised to call with any questions or concerns in the interim.

## 2023-09-22 NOTE — PATIENT INSTRUCTIONS
Please send a copy of Trevor's most recent CBC, BMP, and Keppra level with him to his next appointment! Increase Levetiracetam to 1000 mg twice a day. In 2 weeks, please check a Levetiracetam trough level (i.e., draw level 30-60 min before AM dose). Levetiracetam levels should always be drawn as a trough level. Please call the neurology office ([phone]) to update Dr. Octavio Griffith with the level so that further adjustments can be made, if needed  If there is ever a question regarding the level or dose, please call our office for guidance.  Please also notify the office with any adjustments to either Lamotrigine or Levetiracetam.  Continue Lamotrigine 200 mg twice a day
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
## 2023-12-22 ENCOUNTER — TELEPHONE (OUTPATIENT)
Dept: NEUROLOGY | Facility: CLINIC | Age: 58
End: 2023-12-22

## 2023-12-27 ENCOUNTER — OFFICE VISIT (OUTPATIENT)
Dept: NEUROLOGY | Facility: CLINIC | Age: 58
End: 2023-12-27
Payer: COMMERCIAL

## 2023-12-27 ENCOUNTER — TELEPHONE (OUTPATIENT)
Dept: NEUROLOGY | Facility: CLINIC | Age: 58
End: 2023-12-27

## 2023-12-27 VITALS
SYSTOLIC BLOOD PRESSURE: 112 MMHG | BODY MASS INDEX: 29.45 KG/M2 | WEIGHT: 150 LBS | DIASTOLIC BLOOD PRESSURE: 68 MMHG | HEIGHT: 60 IN

## 2023-12-27 DIAGNOSIS — G40.201 LOCALIZATION-RELATED (FOCAL) (PARTIAL) SYMPTOMATIC EPILEPSY AND EPILEPTIC SYNDROMES WITH COMPLEX PARTIAL SEIZURES, NOT INTRACTABLE, WITH STATUS EPILEPTICUS (HCC): Primary | ICD-10-CM

## 2023-12-27 PROCEDURE — 99215 OFFICE O/P EST HI 40 MIN: CPT | Performed by: NURSE PRACTITIONER

## 2023-12-27 RX ORDER — LEVETIRACETAM 1000 MG/1
1000 TABLET ORAL 2 TIMES DAILY
Start: 2023-12-27

## 2023-12-27 RX ORDER — FERROUS SULFATE 325(65) MG
325 TABLET ORAL
COMMUNITY

## 2023-12-27 RX ORDER — MULTIVIT WITH MINERALS/LUTEIN
1000 TABLET ORAL DAILY
COMMUNITY

## 2023-12-27 RX ORDER — LANOLIN ALCOHOL/MO/W.PET/CERES
100 CREAM (GRAM) TOPICAL DAILY
COMMUNITY

## 2023-12-27 RX ORDER — ZINC GLUCONATE 50 MG
50 TABLET ORAL
COMMUNITY

## 2023-12-27 RX ORDER — CALCIUM CARBONATE 500 MG/1
1 TABLET, CHEWABLE ORAL DAILY
COMMUNITY

## 2023-12-27 RX ORDER — MELATONIN
2000 DAILY
COMMUNITY

## 2023-12-27 RX ORDER — MULTIVITAMIN
1 TABLET ORAL DAILY
COMMUNITY

## 2023-12-27 RX ORDER — SENNA AND DOCUSATE SODIUM 50; 8.6 MG/1; MG/1
2 TABLET, FILM COATED ORAL DAILY
COMMUNITY

## 2023-12-27 NOTE — PROGRESS NOTES
Review of Systems   Constitutional:  Negative for appetite change, fatigue and fever.   HENT: Negative.  Negative for hearing loss, tinnitus, trouble swallowing and voice change.    Eyes: Negative.  Negative for photophobia, pain and visual disturbance.   Respiratory: Negative.  Negative for shortness of breath.    Cardiovascular: Negative.  Negative for palpitations.   Gastrointestinal: Negative.  Negative for nausea and vomiting.   Endocrine: Negative.  Negative for cold intolerance.   Genitourinary: Negative.  Negative for dysuria, frequency and urgency.   Musculoskeletal:  Negative for back pain, gait problem, myalgias, neck pain and neck stiffness.   Skin: Negative.  Negative for rash.   Allergic/Immunologic: Negative.    Neurological: Negative.  Negative for dizziness, tremors, seizures, syncope, facial asymmetry, speech difficulty, weakness, light-headedness, numbness and headaches.   Hematological: Negative.  Does not bruise/bleed easily.   Psychiatric/Behavioral: Negative.  Negative for confusion, hallucinations and sleep disturbance.    All other systems reviewed and are negative.

## 2023-12-27 NOTE — PROGRESS NOTES
Patient ID: Trevor Waldrop is a 58 y.o. male with epilepsy, likely due to right hemisphere encephalomalacia (unclear if post-traumatic or from stroke), who presents for follow up of his seizures.      Assessment/Plan:    Localization-related (focal) (partial) symptomatic epilepsy and epileptic syndromes with complex partial seizures, not intractable, with status epilepticus (HCC)  Patient has been seizure free since his last visit. Unfortunately, his medications were changed again at the facility. At last visit, recommended increasing up to 1000 mg BID of levetiracetam and continuing lamotrigine 200 mg BID. He is currently on lamotrigine 200 mg BID and levetiracetam 1000 mg in the morning and 750 mg at night. It is unclear why facility is changing his seizure medications when he is following with neurology who have recommended any changes to seizure medication be discussed first with neurology.     After 6th phone call attempt to facility, I was able to speak with the RN who will be relaying the message to not change his seizure medications without first speaking with neurology. Awaiting levetiracetam and lamotrigine levels with recent blood work but do not expect that this will change decision to increase levetiracetam dose.     Facility will call the office with any seizures, issues or concerns. Follow up in 3 months with Dr Mccarthy. Blood work 1-2 weeks prior to check lamotrigine and levetiracetam levels as well as CBC and CMP.       Subjective:  Trevor Waldrop is a 58 y.o. male with epilepsy, likely due to right hemisphere encephalomalacia (unclear if post-traumatic or from stroke), who presents for follow up of his seizures.  He was last seen in the office by Dr. Mccarthy on 9/22/2023.  At that time, he reported 1 possible seizures several months prior described as stiffening and shaking with retained awareness (had impaired awareness of prior seizures).  He was on lamotrigine 200 mg twice per day at that time and  levetiracetam.  At prior visit levetiracetam was at 1250 mg twice a day and in the interim it had been decreased to 500 mg twice per day.  The patient reported that this medication was held for several days before restarting at lower dose, unclear timing of the change but level on 7/23/2023 was 119.6.While this is elevated, patient had no side effects and no seizures. Doubt that this was a true trough level as prior levels have all been within normal ranges.  Recommended increasing levetiracetam to 1000 mg twice per day as he has required this dose or higher in the past for seizure freedom.  Recommended trough level be drawn in 2 weeks, 30 to 60 minutes before a.m. dose of levetiracetam and have facility call the office so we can advise on further changes to his dosage.    Patient did have blood work completed, levetiracetam and lamotrigine levels are pending.    The patient presents to his office visit today by himself.  He reports that he has not had any seizures.  Currently on lamotrigine 200 mg twice per day and levetiracetam 1000 mg in the morning and 750 mg at night. Again, it is unclear why this change was made. He also does not know why this change was made. Reports that he asked and he was told that this was changed. He was upset that these changes were made as they did not discuss with Dr Mccarthy.     After multiple attempts, was able to get in touch with KOREY Barnes from [facility]. No clear reason as to why levetiracetam dose was decreased, possibly due to prior levels. No seizures since his last visit. No new issues, concerns reported.     Current seizure medications:  - lamotrigine 200 mg BID  - levetiracetam 1000 mg in the morning and 750 mg at night  Other medications as per Epic.      Prior AEDs:  Patient is not sure, but per prior notes, he was treated with Phenytoin and Carbamazepine     Prior Evaluation:  - CT head 6/28/2019: old infarctions with encephalomalacia on the right. Diffuse volume  "loss  - MRI brain: none available  - Routine EE2019: almost continuous right temporal slowing  - Video EEG: none       I reviewed prior neurology notes, most recent labs, as documented in Epic/[employer], and summarized above.        Objective:    Blood pressure 112/68, height 4' 5\" (1.346 m), weight 68 kg (150 lb).    Physical Exam  No apparent distress.   Appears well nourished.   Mood appropriate for situation     Neurologic Exam  Mental status- alert and oriented to person, place, and time. Speech appropriate for conversation.     Cranial Nerves-  EOMS normal, facial muscles symmetric, hearing intact to conversation, speech normal, shoulder shrug symmetrical.    Motor- Moves upper extremities freely. Bilateral lower extremity amputations.     Sensory-  Intact distally in all extremities to light touch.     DTRs- not assessed at today's visit.     Gait- seated in wheelchair. Bilateral lower extremity amputations.     Coordination- FNF intact.     ROS:  Review of Systems   Constitutional:  Negative for appetite change, fatigue and fever.   HENT: Negative.  Negative for hearing loss, tinnitus, trouble swallowing and voice change.    Eyes: Negative.  Negative for photophobia, pain and visual disturbance.   Respiratory: Negative.  Negative for shortness of breath.    Cardiovascular: Negative.  Negative for palpitations.   Gastrointestinal: Negative.  Negative for nausea and vomiting.   Endocrine: Negative.  Negative for cold intolerance.   Genitourinary: Negative.  Negative for dysuria, frequency and urgency.   Musculoskeletal:  Negative for back pain, gait problem, myalgias, neck pain and neck stiffness.   Skin: Negative.  Negative for rash.   Allergic/Immunologic: Negative.    Neurological: Negative.  Negative for dizziness, tremors, seizures, syncope, facial asymmetry, speech difficulty, weakness, light-headedness, numbness and headaches.   Hematological: Negative.  Does not bruise/bleed easily. "   Psychiatric/Behavioral: Negative.  Negative for confusion, hallucinations and sleep disturbance.    All other systems reviewed and are negative.       ROS obtained by MA and reviewed by myself.       This note may have been created using voice recognition software. There may be unintentional errors such as grammatical errors, spelling errors, or pronoun errors.

## 2023-12-27 NOTE — PATIENT INSTRUCTIONS
"- Please increase levetiracetam to 1000 mg twice per day.  - Continue current dose of lamotrigine 200 mg twice per day.  - Please do not make changes to his seizure medications without first discussing with neurology. He requires at least the above dosing, has had seizures at lower doses in the past.   - Follow up in 3 months with Dr Mccarthy - blood work 2 weeks prior.    I attempted to call your facility 5 times during Trevor's visit. The phone calls that did make it to the RN were dropped by the facility twice. We would like to figure out why Trevor's medications have been changed multiple times. The blood work that was provided during his visit today does not have all of the results, levetiracetam and lamotrigine level are pending. I was unable to verify that this blood work was a trough level for when it is resulted. Please have someone from facility contact our office when able. You should be aware that when \"\" is selected from the options list, it is routed to a patient's phone.   "

## 2023-12-27 NOTE — ASSESSMENT & PLAN NOTE
Patient has been seizure free since his last visit. Unfortunately, his medications were changed again at the facility. At last visit, recommended increasing up to 1000 mg BID of levetiracetam and continuing lamotrigine 200 mg BID. He is currently on lamotrigine 200 mg BID and levetiracetam 1000 mg in the morning and 750 mg at night. It is unclear why facility is changing his seizure medications when he is following with neurology who have recommended any changes to seizure medication be discussed first with neurology.     After 6th phone call attempt to facility, I was able to speak with the RN who will be relaying the message to not change his seizure medications without first speaking with neurology. Awaiting levetiracetam and lamotrigine levels with recent blood work but do not expect that this will change decision to increase levetiracetam dose.     Facility will call the office with any seizures, issues or concerns. Follow up in 3 months with Dr Mccarthy. Blood work 1-2 weeks prior to check lamotrigine and levetiracetam levels as well as CBC and CMP.

## 2023-12-27 NOTE — TELEPHONE ENCOUNTER
Molly from [facility] called to advise that whoever was trying call for this patient the calls kept dropping. If you still needed any additional information to please call back.

## 2024-02-21 PROBLEM — N10 ACUTE PYELONEPHRITIS: Status: RESOLVED | Noted: 2020-11-13 | Resolved: 2024-02-21

## 2024-02-21 PROBLEM — Z12.11 COLON CANCER SCREENING: Status: RESOLVED | Noted: 2019-06-14 | Resolved: 2024-02-21

## 2025-03-05 ENCOUNTER — APPOINTMENT (EMERGENCY)
Dept: RADIOLOGY | Facility: HOSPITAL | Age: 60
DRG: 720 | End: 2025-03-05
Payer: COMMERCIAL

## 2025-03-05 ENCOUNTER — APPOINTMENT (INPATIENT)
Dept: ULTRASOUND IMAGING | Facility: HOSPITAL | Age: 60
DRG: 720 | End: 2025-03-05
Payer: COMMERCIAL

## 2025-03-05 ENCOUNTER — HOSPITAL ENCOUNTER (INPATIENT)
Facility: HOSPITAL | Age: 60
LOS: 9 days | Discharge: NURSING, REHAB OR LONG-TERM CARE | DRG: 720 | End: 2025-03-14
Attending: EMERGENCY MEDICINE | Admitting: INTERNAL MEDICINE
Payer: COMMERCIAL

## 2025-03-05 ENCOUNTER — APPOINTMENT (EMERGENCY)
Dept: CT IMAGING | Facility: HOSPITAL | Age: 60
DRG: 720 | End: 2025-03-05
Payer: COMMERCIAL

## 2025-03-05 DIAGNOSIS — E87.1 HYPONATREMIA: ICD-10-CM

## 2025-03-05 DIAGNOSIS — N17.9 AKI (ACUTE KIDNEY INJURY) (HCC): ICD-10-CM

## 2025-03-05 DIAGNOSIS — R65.20 SEVERE SEPSIS (HCC): ICD-10-CM

## 2025-03-05 DIAGNOSIS — A41.9 SEVERE SEPSIS (HCC): ICD-10-CM

## 2025-03-05 DIAGNOSIS — J10.1 INFLUENZA A: Primary | ICD-10-CM

## 2025-03-05 DIAGNOSIS — J18.9 PNEUMONIA: ICD-10-CM

## 2025-03-05 DIAGNOSIS — N30.01 ACUTE CYSTITIS WITH HEMATURIA: ICD-10-CM

## 2025-03-05 DIAGNOSIS — E87.20 METABOLIC ACIDOSIS: ICD-10-CM

## 2025-03-05 DIAGNOSIS — N13.39 OTHER HYDRONEPHROSIS: ICD-10-CM

## 2025-03-05 DIAGNOSIS — R33.9 URINARY RETENTION: ICD-10-CM

## 2025-03-05 DIAGNOSIS — G40.909 SEIZURE DISORDER (HCC): ICD-10-CM

## 2025-03-05 DIAGNOSIS — N39.0 UTI (URINARY TRACT INFECTION): ICD-10-CM

## 2025-03-05 PROBLEM — R65.21 SEPTIC SHOCK (HCC): Status: ACTIVE | Noted: 2020-11-14

## 2025-03-05 LAB
2HR DELTA HS TROPONIN: -2 NG/L
ABO GROUP BLD: NORMAL
ALBUMIN SERPL BCG-MCNC: 3.3 G/DL (ref 3.5–5)
ALP SERPL-CCNC: 62 U/L (ref 34–104)
ALT SERPL W P-5'-P-CCNC: 21 U/L (ref 7–52)
ANION GAP SERPL CALCULATED.3IONS-SCNC: 16 MMOL/L (ref 4–13)
APTT PPP: 38 SECONDS (ref 23–34)
AST SERPL W P-5'-P-CCNC: 36 U/L (ref 13–39)
ATRIAL RATE: 127 BPM
BACTERIA UR QL AUTO: ABNORMAL /HPF
BASOPHILS # BLD MANUAL: 0 THOUSAND/UL (ref 0–0.1)
BASOPHILS NFR MAR MANUAL: 0 % (ref 0–1)
BILIRUB SERPL-MCNC: 0.33 MG/DL (ref 0.2–1)
BILIRUB UR QL STRIP: NEGATIVE
BUN SERPL-MCNC: 109 MG/DL (ref 5–25)
CALCIUM ALBUM COR SERPL-MCNC: 9.6 MG/DL (ref 8.3–10.1)
CALCIUM SERPL-MCNC: 9 MG/DL (ref 8.4–10.2)
CARDIAC TROPONIN I PNL SERPL HS: 11 NG/L (ref ?–50)
CARDIAC TROPONIN I PNL SERPL HS: 13 NG/L (ref ?–50)
CHLORIDE SERPL-SCNC: 107 MMOL/L (ref 96–108)
CK SERPL-CCNC: 521 U/L (ref 39–308)
CLARITY UR: ABNORMAL
CO2 SERPL-SCNC: 12 MMOL/L (ref 21–32)
COLOR UR: ABNORMAL
CREAT SERPL-MCNC: 6.38 MG/DL (ref 0.6–1.3)
EOSINOPHIL # BLD MANUAL: 0 THOUSAND/UL (ref 0–0.4)
EOSINOPHIL NFR BLD MANUAL: 0 % (ref 0–6)
ERYTHROCYTE [DISTWIDTH] IN BLOOD BY AUTOMATED COUNT: 12.2 % (ref 11.6–15.1)
FLUAV RNA RESP QL NAA+PROBE: POSITIVE
FLUBV RNA RESP QL NAA+PROBE: NEGATIVE
GFR SERPL CREATININE-BSD FRML MDRD: 8 ML/MIN/1.73SQ M
GLUCOSE SERPL-MCNC: 98 MG/DL (ref 65–140)
GLUCOSE UR STRIP-MCNC: NEGATIVE MG/DL
HCT VFR BLD AUTO: 30.5 % (ref 36.5–49.3)
HGB BLD-MCNC: 10.2 G/DL (ref 12–17)
HGB UR QL STRIP.AUTO: ABNORMAL
INR PPP: 1.38 (ref 0.85–1.19)
KETONES UR STRIP-MCNC: NEGATIVE MG/DL
L PNEUMO1 AG UR QL IA.RAPID: NEGATIVE
LACTATE SERPL-SCNC: 1.1 MMOL/L (ref 0.5–2)
LEUKOCYTE ESTERASE UR QL STRIP: ABNORMAL
LEVETIRACETAM SERPL-MCNC: 105 UG/ML (ref 12–46)
LEVETIRACETAM SERPL-MCNC: 165.2 UG/ML (ref 12–46)
LIPASE SERPL-CCNC: <6 U/L (ref 11–82)
LYMPHOCYTES # BLD AUTO: 1.1 THOUSAND/UL (ref 0.6–4.47)
LYMPHOCYTES # BLD AUTO: 13 % (ref 14–44)
MAGNESIUM SERPL-MCNC: 2.6 MG/DL (ref 1.9–2.7)
MCH RBC QN AUTO: 31.7 PG (ref 26.8–34.3)
MCHC RBC AUTO-ENTMCNC: 33.4 G/DL (ref 31.4–37.4)
MCV RBC AUTO: 95 FL (ref 82–98)
MONOCYTES # BLD AUTO: 0 THOUSAND/UL (ref 0–1.22)
MONOCYTES NFR BLD: 0 % (ref 4–12)
NEUTROPHILS # BLD MANUAL: 7.37 THOUSAND/UL (ref 1.85–7.62)
NEUTS BAND NFR BLD MANUAL: 5 % (ref 0–8)
NEUTS SEG NFR BLD AUTO: 82 % (ref 43–75)
NITRITE UR QL STRIP: NEGATIVE
NON-SQ EPI CELLS URNS QL MICRO: ABNORMAL /HPF
P AXIS: 86 DEGREES
PH UR STRIP.AUTO: 8 [PH]
PHOSPHATE SERPL-MCNC: 4 MG/DL (ref 2.3–4.1)
PLATELET # BLD AUTO: 206 THOUSANDS/UL (ref 149–390)
PLATELET BLD QL SMEAR: ADEQUATE
PMV BLD AUTO: 9.6 FL (ref 8.9–12.7)
POTASSIUM SERPL-SCNC: 5.1 MMOL/L (ref 3.5–5.3)
PR INTERVAL: 122 MS
PROCALCITONIN SERPL-MCNC: 14.58 NG/ML
PROT SERPL-MCNC: 7.8 G/DL (ref 6.4–8.4)
PROT UR STRIP-MCNC: ABNORMAL MG/DL
PROTHROMBIN TIME: 17.4 SECONDS (ref 12.3–15)
QRS AXIS: 89 DEGREES
QRSD INTERVAL: 80 MS
QT INTERVAL: 288 MS
QTC INTERVAL: 418 MS
RBC # BLD AUTO: 3.22 MILLION/UL (ref 3.88–5.62)
RBC #/AREA URNS AUTO: ABNORMAL /HPF
RBC MORPH BLD: NORMAL
RH BLD: POSITIVE
RSV RNA RESP QL NAA+PROBE: NEGATIVE
S PNEUM AG UR QL: NEGATIVE
SARS-COV-2 RNA RESP QL NAA+PROBE: NEGATIVE
SODIUM SERPL-SCNC: 135 MMOL/L (ref 135–147)
SP GR UR STRIP.AUTO: 1.02 (ref 1–1.03)
T WAVE AXIS: 83 DEGREES
TOXIC GRANULES BLD QL SMEAR: PRESENT
UROBILINOGEN UR STRIP-ACNC: <2 MG/DL
VENTRICULAR RATE: 127 BPM
WBC # BLD AUTO: 8.47 THOUSAND/UL (ref 4.31–10.16)
WBC #/AREA URNS AUTO: ABNORMAL /HPF

## 2025-03-05 PROCEDURE — 71045 X-RAY EXAM CHEST 1 VIEW: CPT

## 2025-03-05 PROCEDURE — 85007 BL SMEAR W/DIFF WBC COUNT: CPT | Performed by: EMERGENCY MEDICINE

## 2025-03-05 PROCEDURE — 96366 THER/PROPH/DIAG IV INF ADDON: CPT

## 2025-03-05 PROCEDURE — 99285 EMERGENCY DEPT VISIT HI MDM: CPT

## 2025-03-05 PROCEDURE — 85027 COMPLETE CBC AUTOMATED: CPT | Performed by: EMERGENCY MEDICINE

## 2025-03-05 PROCEDURE — 87086 URINE CULTURE/COLONY COUNT: CPT | Performed by: EMERGENCY MEDICINE

## 2025-03-05 PROCEDURE — 93010 ELECTROCARDIOGRAM REPORT: CPT | Performed by: INTERNAL MEDICINE

## 2025-03-05 PROCEDURE — 99291 CRITICAL CARE FIRST HOUR: CPT | Performed by: INTERNAL MEDICINE

## 2025-03-05 PROCEDURE — 80053 COMPREHEN METABOLIC PANEL: CPT | Performed by: EMERGENCY MEDICINE

## 2025-03-05 PROCEDURE — 87040 BLOOD CULTURE FOR BACTERIA: CPT | Performed by: EMERGENCY MEDICINE

## 2025-03-05 PROCEDURE — 84484 ASSAY OF TROPONIN QUANT: CPT | Performed by: EMERGENCY MEDICINE

## 2025-03-05 PROCEDURE — 96365 THER/PROPH/DIAG IV INF INIT: CPT

## 2025-03-05 PROCEDURE — 83735 ASSAY OF MAGNESIUM: CPT | Performed by: NURSE PRACTITIONER

## 2025-03-05 PROCEDURE — 85730 THROMBOPLASTIN TIME PARTIAL: CPT | Performed by: EMERGENCY MEDICINE

## 2025-03-05 PROCEDURE — 84100 ASSAY OF PHOSPHORUS: CPT | Performed by: NURSE PRACTITIONER

## 2025-03-05 PROCEDURE — 87081 CULTURE SCREEN ONLY: CPT | Performed by: NURSE PRACTITIONER

## 2025-03-05 PROCEDURE — 93005 ELECTROCARDIOGRAM TRACING: CPT

## 2025-03-05 PROCEDURE — 81001 URINALYSIS AUTO W/SCOPE: CPT | Performed by: EMERGENCY MEDICINE

## 2025-03-05 PROCEDURE — 87077 CULTURE AEROBIC IDENTIFY: CPT | Performed by: EMERGENCY MEDICINE

## 2025-03-05 PROCEDURE — 71250 CT THORAX DX C-: CPT

## 2025-03-05 PROCEDURE — 82550 ASSAY OF CK (CPK): CPT | Performed by: EMERGENCY MEDICINE

## 2025-03-05 PROCEDURE — 80175 DRUG SCREEN QUAN LAMOTRIGINE: CPT | Performed by: EMERGENCY MEDICINE

## 2025-03-05 PROCEDURE — 83690 ASSAY OF LIPASE: CPT | Performed by: EMERGENCY MEDICINE

## 2025-03-05 PROCEDURE — 74176 CT ABD & PELVIS W/O CONTRAST: CPT

## 2025-03-05 PROCEDURE — 85610 PROTHROMBIN TIME: CPT | Performed by: EMERGENCY MEDICINE

## 2025-03-05 PROCEDURE — 83520 IMMUNOASSAY QUANT NOS NONAB: CPT | Performed by: EMERGENCY MEDICINE

## 2025-03-05 PROCEDURE — 84145 PROCALCITONIN (PCT): CPT | Performed by: EMERGENCY MEDICINE

## 2025-03-05 PROCEDURE — 83520 IMMUNOASSAY QUANT NOS NONAB: CPT | Performed by: NURSE PRACTITIONER

## 2025-03-05 PROCEDURE — 94640 AIRWAY INHALATION TREATMENT: CPT

## 2025-03-05 PROCEDURE — 36415 COLL VENOUS BLD VENIPUNCTURE: CPT | Performed by: EMERGENCY MEDICINE

## 2025-03-05 PROCEDURE — 87449 NOS EACH ORGANISM AG IA: CPT | Performed by: NURSE PRACTITIONER

## 2025-03-05 PROCEDURE — 0241U HB NFCT DS VIR RESP RNA 4 TRGT: CPT | Performed by: EMERGENCY MEDICINE

## 2025-03-05 PROCEDURE — 0T9B70Z DRAINAGE OF BLADDER WITH DRAINAGE DEVICE, VIA NATURAL OR ARTIFICIAL OPENING: ICD-10-PCS | Performed by: HOSPITALIST

## 2025-03-05 PROCEDURE — 94760 N-INVAS EAR/PLS OXIMETRY 1: CPT

## 2025-03-05 PROCEDURE — 80175 DRUG SCREEN QUAN LAMOTRIGINE: CPT | Performed by: NURSE PRACTITIONER

## 2025-03-05 PROCEDURE — 99291 CRITICAL CARE FIRST HOUR: CPT | Performed by: EMERGENCY MEDICINE

## 2025-03-05 PROCEDURE — 83605 ASSAY OF LACTIC ACID: CPT | Performed by: EMERGENCY MEDICINE

## 2025-03-05 PROCEDURE — 87186 SC STD MICRODIL/AGAR DIL: CPT | Performed by: EMERGENCY MEDICINE

## 2025-03-05 RX ORDER — ALBUMIN HUMAN 50 G/1000ML
25 SOLUTION INTRAVENOUS ONCE
Status: COMPLETED | OUTPATIENT
Start: 2025-03-05 | End: 2025-03-05

## 2025-03-05 RX ORDER — PANTOPRAZOLE SODIUM 20 MG/1
20 TABLET, DELAYED RELEASE ORAL
Status: DISCONTINUED | OUTPATIENT
Start: 2025-03-06 | End: 2025-03-14 | Stop reason: HOSPADM

## 2025-03-05 RX ORDER — HEPARIN SODIUM 5000 [USP'U]/ML
5000 INJECTION, SOLUTION INTRAVENOUS; SUBCUTANEOUS EVERY 8 HOURS SCHEDULED
Status: DISCONTINUED | OUTPATIENT
Start: 2025-03-05 | End: 2025-03-14 | Stop reason: HOSPADM

## 2025-03-05 RX ORDER — OSELTAMIVIR PHOSPHATE 30 MG/1
30 CAPSULE ORAL ONCE
Status: COMPLETED | OUTPATIENT
Start: 2025-03-05 | End: 2025-03-05

## 2025-03-05 RX ORDER — CHLORHEXIDINE GLUCONATE ORAL RINSE 1.2 MG/ML
15 SOLUTION DENTAL EVERY 12 HOURS SCHEDULED
Status: DISCONTINUED | OUTPATIENT
Start: 2025-03-05 | End: 2025-03-14 | Stop reason: HOSPADM

## 2025-03-05 RX ORDER — CEFEPIME HYDROCHLORIDE 1 G/50ML
1000 INJECTION, SOLUTION INTRAVENOUS EVERY 24 HOURS
Status: DISCONTINUED | OUTPATIENT
Start: 2025-03-06 | End: 2025-03-06

## 2025-03-05 RX ORDER — LAMOTRIGINE 100 MG/1
200 TABLET ORAL 2 TIMES DAILY
Status: DISCONTINUED | OUTPATIENT
Start: 2025-03-05 | End: 2025-03-14 | Stop reason: HOSPADM

## 2025-03-05 RX ORDER — SODIUM CHLORIDE, SODIUM GLUCONATE, SODIUM ACETATE, POTASSIUM CHLORIDE, MAGNESIUM CHLORIDE, SODIUM PHOSPHATE, DIBASIC, AND POTASSIUM PHOSPHATE .53; .5; .37; .037; .03; .012; .00082 G/100ML; G/100ML; G/100ML; G/100ML; G/100ML; G/100ML; G/100ML
500 INJECTION, SOLUTION INTRAVENOUS ONCE
Status: COMPLETED | OUTPATIENT
Start: 2025-03-05 | End: 2025-03-05

## 2025-03-05 RX ORDER — ACETAMINOPHEN 325 MG/1
650 TABLET ORAL EVERY 4 HOURS PRN
Status: DISCONTINUED | OUTPATIENT
Start: 2025-03-05 | End: 2025-03-14 | Stop reason: HOSPADM

## 2025-03-05 RX ORDER — LEVETIRACETAM 500 MG/1
1000 TABLET ORAL EVERY 12 HOURS SCHEDULED
Status: DISCONTINUED | OUTPATIENT
Start: 2025-03-05 | End: 2025-03-06 | Stop reason: DRUGHIGH

## 2025-03-05 RX ORDER — CEFEPIME HYDROCHLORIDE 1 G/50ML
1000 INJECTION, SOLUTION INTRAVENOUS ONCE
Status: COMPLETED | OUTPATIENT
Start: 2025-03-05 | End: 2025-03-05

## 2025-03-05 RX ORDER — CALCIUM CARBONATE 500 MG/1
1000 TABLET, CHEWABLE ORAL 2 TIMES DAILY PRN
Status: DISCONTINUED | OUTPATIENT
Start: 2025-03-05 | End: 2025-03-14 | Stop reason: HOSPADM

## 2025-03-05 RX ORDER — MIRTAZAPINE 15 MG/1
7.5 TABLET, FILM COATED ORAL
Status: DISCONTINUED | OUTPATIENT
Start: 2025-03-05 | End: 2025-03-14 | Stop reason: HOSPADM

## 2025-03-05 RX ORDER — LEVALBUTEROL INHALATION SOLUTION 1.25 MG/3ML
1.25 SOLUTION RESPIRATORY (INHALATION)
Status: DISCONTINUED | OUTPATIENT
Start: 2025-03-05 | End: 2025-03-06

## 2025-03-05 RX ORDER — VANCOMYCIN HYDROCHLORIDE 1 G/200ML
20 INJECTION, SOLUTION INTRAVENOUS ONCE
Status: COMPLETED | OUTPATIENT
Start: 2025-03-05 | End: 2025-03-05

## 2025-03-05 RX ORDER — VANCOMYCIN HYDROCHLORIDE 500 MG/100ML
12.5 INJECTION, SOLUTION INTRAVENOUS ONCE AS NEEDED
Status: DISCONTINUED | OUTPATIENT
Start: 2025-03-06 | End: 2025-03-06

## 2025-03-05 RX ORDER — OSELTAMIVIR PHOSPHATE 30 MG/1
30 CAPSULE ORAL
Status: DISCONTINUED | OUTPATIENT
Start: 2025-03-07 | End: 2025-03-06

## 2025-03-05 RX ORDER — MIDODRINE HYDROCHLORIDE 5 MG/1
5 TABLET ORAL EVERY 12 HOURS
COMMUNITY
Start: 2025-03-04 | End: 2025-03-14

## 2025-03-05 RX ORDER — ACETAMINOPHEN 10 MG/ML
1000 INJECTION, SOLUTION INTRAVENOUS ONCE
Status: COMPLETED | OUTPATIENT
Start: 2025-03-05 | End: 2025-03-05

## 2025-03-05 RX ADMIN — SODIUM CHLORIDE, SODIUM LACTATE, POTASSIUM CHLORIDE, AND CALCIUM CHLORIDE 1000 ML: .6; .31; .03; .02 INJECTION, SOLUTION INTRAVENOUS at 09:33

## 2025-03-05 RX ADMIN — SODIUM CHLORIDE 1000 ML: 0.9 INJECTION, SOLUTION INTRAVENOUS at 05:21

## 2025-03-05 RX ADMIN — MIRTAZAPINE 7.5 MG: 15 TABLET, FILM COATED ORAL at 21:31

## 2025-03-05 RX ADMIN — ALBUMIN (HUMAN) 25 G: 12.5 INJECTION, SOLUTION INTRAVENOUS at 10:08

## 2025-03-05 RX ADMIN — CHLORHEXIDINE GLUCONATE 15 ML: 1.2 RINSE ORAL at 21:31

## 2025-03-05 RX ADMIN — OSELTAMAVIR PHOSPHATE 30 MG: 30 CAPSULE ORAL at 06:43

## 2025-03-05 RX ADMIN — SODIUM CHLORIDE, SODIUM GLUCONATE, SODIUM ACETATE, POTASSIUM CHLORIDE, MAGNESIUM CHLORIDE, SODIUM PHOSPHATE, DIBASIC, AND POTASSIUM PHOSPHATE 500 ML: .53; .5; .37; .037; .03; .012; .00082 INJECTION, SOLUTION INTRAVENOUS at 21:39

## 2025-03-05 RX ADMIN — HEPARIN SODIUM 5000 UNITS: 5000 INJECTION INTRAVENOUS; SUBCUTANEOUS at 21:31

## 2025-03-05 RX ADMIN — SODIUM CHLORIDE 1000 ML: 0.9 INJECTION, SOLUTION INTRAVENOUS at 06:27

## 2025-03-05 RX ADMIN — ACETAMINOPHEN 1000 MG: 10 INJECTION INTRAVENOUS at 05:57

## 2025-03-05 RX ADMIN — NOREPINEPHRINE BITARTRATE 5 MCG/MIN: 1 INJECTION INTRAVENOUS at 09:15

## 2025-03-05 RX ADMIN — Medication 6 MG: at 21:31

## 2025-03-05 RX ADMIN — HEPARIN SODIUM 5000 UNITS: 5000 INJECTION INTRAVENOUS; SUBCUTANEOUS at 14:40

## 2025-03-05 RX ADMIN — CEFEPIME HYDROCHLORIDE 1000 MG: 1 INJECTION, SOLUTION INTRAVENOUS at 05:28

## 2025-03-05 RX ADMIN — IPRATROPIUM BROMIDE 0.5 MG: 0.5 SOLUTION RESPIRATORY (INHALATION) at 19:25

## 2025-03-05 RX ADMIN — VANCOMYCIN HYDROCHLORIDE 1000 MG: 1 INJECTION, SOLUTION INTRAVENOUS at 06:40

## 2025-03-05 RX ADMIN — LEVALBUTEROL HYDROCHLORIDE 1.25 MG: 1.25 SOLUTION RESPIRATORY (INHALATION) at 19:25

## 2025-03-05 RX ADMIN — ACETAMINOPHEN 650 MG: 325 TABLET, FILM COATED ORAL at 18:08

## 2025-03-05 NOTE — PROGRESS NOTES
Trevor Waldrop is a 60 y.o. male who is currently ordered Vancomycin IV with management by the Pharmacy Consult service.  Relevant clinical data and objective / subjective history reviewed.  Vancomycin Assessment:  Indication and Goal AUC/Trough: Bacteremia (goal -600, trough >10); Urinary tract infection (goal -600, trough >10); Pneumonia (goal -600, trough >10)  Clinical Status:  NEW  Micro:     Renal Function:  SCr: 6.38 mg/dL  CrCl: 7.3 mL/min  Renal replacement: Not on dialysis  Days of Therapy: 1  Current Dose: 1000 mg once  Vancomycin Plan:  New Dosing: pulse dosing based on 12.5 mg/kg = 500 mg iv for trough <15. not on dialysis yet per Icu team     Next Level: with AM labs on 03/06  Renal Function Monitoring: Daily BMP and UOP  Pharmacy will continue to follow closely for s/sx of nephrotoxicity, infusion reactions and appropriateness of therapy.  BMP and CBC will be ordered per protocol. We will continue to follow the patient’s culture results and clinical progress daily.    Hanh Gooden, Pharmacist

## 2025-03-05 NOTE — PLAN OF CARE
Problem: Potential for Falls  Goal: Patient will remain free of falls  Description: INTERVENTIONS:  - Educate patient/family on patient safety including physical limitations  - Instruct patient to call for assistance with activity   - Consult OT/PT to assist with strengthening/mobility   - Keep Call bell within reach  - Keep bed low and locked with side rails adjusted as appropriate  - Keep care items and personal belongings within reach  - Initiate and maintain comfort rounds  - Make Fall Risk Sign visible to staff  - Offer Toileting every 2 Hours, in advance of need  - Initiate/Maintain bed alarm  - Obtain necessary fall risk management equipment  - Apply yellow socks and bracelet for high fall risk patients  - Consider moving patient to room near nurses station  Outcome: Progressing     Problem: Prexisting or High Potential for Compromised Skin Integrity  Goal: Skin integrity is maintained or improved  Description: INTERVENTIONS:  - Identify patients at risk for skin breakdown  - Assess and monitor skin integrity  - Assess and monitor nutrition and hydration status  - Monitor labs   - Assess for incontinence   - Turn and reposition patient  - Assist with mobility/ambulation  - Relieve pressure over bony prominences  - Avoid friction and shearing  - Provide appropriate hygiene as needed including keeping skin clean and dry  - Evaluate need for skin moisturizer/barrier cream  - Collaborate with interdisciplinary team   - Patient/family teaching  - Consider wound care consult   Outcome: Progressing     Problem: PAIN - ADULT  Goal: Verbalizes/displays adequate comfort level or baseline comfort level  Description: Interventions:  - Encourage patient to monitor pain and request assistance  - Assess pain using appropriate pain scale  - Administer analgesics based on type and severity of pain and evaluate response  - Implement non-pharmacological measures as appropriate and evaluate response  - Consider cultural and  social influences on pain and pain management  - Notify physician/advanced practitioner if interventions unsuccessful or patient reports new pain  Outcome: Progressing     Problem: SAFETY ADULT  Goal: Patient will remain free of falls  Description: INTERVENTIONS:  - Educate patient/family on patient safety including physical limitations  - Instruct patient to call for assistance with activity   - Consult OT/PT to assist with strengthening/mobility   - Keep Call bell within reach  - Keep bed low and locked with side rails adjusted as appropriate  - Keep care items and personal belongings within reach  - Initiate and maintain comfort rounds  - Make Fall Risk Sign visible to staff  - Offer Toileting every 2 Hours, in advance of need  - Initiate/Maintain bed alarm  - Obtain necessary fall risk management equipment  - Apply yellow socks and bracelet for high fall risk patients  - Consider moving patient to room near nurses station  Outcome: Progressing  Goal: Maintain or return to baseline ADL function  Description: INTERVENTIONS:  -  Assess patient's ability to carry out ADLs; assess patient's baseline for ADL function and identify physical deficits which impact ability to perform ADLs (bathing, care of mouth/teeth, toileting, grooming, dressing, etc.)  - Assess/evaluate cause of self-care deficits   - Assess range of motion  - Assess patient's mobility; develop plan if impaired  - Assess patient's need for assistive devices and provide as appropriate  - Encourage maximum independence but intervene and supervise when necessary  - Involve family in performance of ADLs  - Assess for home care needs following discharge   - Consider OT consult to assist with ADL evaluation and planning for discharge  - Provide patient education as appropriate  Outcome: Progressing  Goal: Maintains/Returns to pre admission functional level  Description: INTERVENTIONS:  - Perform AM-PAC 6 Click Basic Mobility/ Daily Activity assessment  daily.  - Set and communicate daily mobility goal to care team and patient/family/caregiver.   - Collaborate with rehabilitation services on mobility goals if consulted  - Perform Range of Motion 3 times a day.  - Reposition patient every 2 hours.  - Dangle patient 3 times a day  - Stand patient 3 times a day  - Ambulate patient 3 times a day  - Out of bed to chair 3 times a day   - Out of bed for meals 3 times a day  - Out of bed for toileting  - Record patient progress and toleration of activity level   Outcome: Progressing

## 2025-03-05 NOTE — H&P
H&P - Critical Care/ICU   Name: Trevor Waldrop 60 y.o. male I MRN: [id number]  Unit/Bed#: -01 I Date of Admission: 3/5/2025   Date of Service: 3/5/2025 I Hospital Day: 0       Assessment & Plan  Septic shock (HCC)  Patient presented from nursing facility with reports of fever and hypotension requiring subcutaneous IV fluids.  SIRS criteria: Tachycardia, tachypnea, fever  Found to be influenza A positive and UA suggestive of UTI  CT chest abdomen pelvis showing left lower lobe consolidation  In the ER patient was hypotensive and had transient response to IV fluids  He was started on Levophed for blood pressure support  Cultures were obtained and he was started on Tamiflu, cefepime, vancomycin  Admit to ICU  Continue Levophed for MAP goal greater than 65  Continue fluid resuscitation  Continue Tamiflu, cefepime, vancomycin  Follow-up blood cultures, urine culture, MRSA, Legionella, strep  Left lower lobe pneumonia  Started on empiric antibiotics, see plan above under septic shock  Influenza A  Pt influenza A positive in the ER.  From nursing facility where they have an influenza outbreak  Started on Tamiflu, continue for 5 days  Tamiflu dose renally adjusted, continue to adjust as renal function improves  Acute cystitis with hematuria  UA on admission suggestive of UTI  Anderson placed in ER and noted to have hematuria   Started on cefepime and vancomycin in the ER  Continue close monitoring of I/O status and renal function daily  Continue antibiotics  Follow-up cultures  Check renal ultrasound  JOHNY (acute kidney injury) (HCC)  Patient presented with creatinine of 6.36, prior creatinines in 2022 were 1.1-1.4  In the setting of UTI and shock  Anderson placed in ER with noted hematuria  CT showing severe bilateral hydronephroureterosis extending through distended bladder.  No calculi or perinephritic collection seen  Patient received 3 L IV fluid in the ER  Continue to fluid resuscitate as needed  Trend I/O status and  renal indices closely  Check renal ultrasound  Consider nephrology consult if renal function does not improve with fluid resuscitation and UTI treatment  Metabolic acidosis  The setting of UTI, JOHNY, and septic shock  Serum bicarb 12, anion gap of 16, lactic acid 1.1  Fluid resuscitating  Trend BMP and renal function  Started on empiric antibiotics, see plan above under septic shock  GERD with esophagitis  Home PPI and as needed Tums  Holding home Pepcid  Bipolar disorder (HCC)  Continue home melatonin and Remeron  Localization-related (focal) (partial) symptomatic epilepsy and epileptic syndromes with complex partial seizures, not intractable, with status epilepticus (HCC)  Patient with history of seizures, follows with neurology  Continuing home Lamictal 200 mg twice daily and Keppra 1000 mg twice daily  Disposition: Critical care    History of Present Illness   Trevor Waldrop is a 60 y.o. who presents from nursing facility with report of fever, hypotension, in the setting of influenza outbreak at the facility.  In the ER patient was found to be tachycardic, tachypneic, and with fever of 101.5 on admission.  Found to be influenza A positive, imaging showing left lower lobe consolidation.  UA suggestive of UTI, Anderson placed and patient with hematuria.  Cultures were obtained and he was started on Tamiflu, cefepime, vancomycin.  He was given 3 L IV fluid in the ER with transient response and eventually needed to start on vasopressors.  Past medical history of TBI, seizures, GERD, anemia, hyponatremia, bipolar, bilateral lower extremity amputations.    History obtained from chart review and the patient.  Review of Systems: See HPI for Review of Systems    Historical Information   Past Medical History:  No date: Alcohol abuse  6/14/2019: Anemia      Comment:  On iron.  Unclear if ever documented iron deficient  No date: GERD (gastroesophageal reflux disease)  6/14/2019: GERD with esophagitis      Comment:  EGD in [place]  [place] [id number]  No date: Hx of AKA (above knee amputation) (HCC)      Comment:  Right  No date: Infectious viral hepatitis  No date: Seizures (HCC)  No date: Seizures (HCC)  No date: Ulcer of esophagus Past Surgical History:  No date: BELOW KNEE LEG AMPUTATION      Comment:  Left  04/25/2017: EGD  No date: LUNG DECORTICATION; Left   Current Outpatient Medications   Medication Instructions    acetaminophen (TYLENOL) 650 mg, Oral, Every 4 hours PRN    aluminum-magnesium hydroxide-simethicone (MYLANTA) 200-200-20 mg/5 mL suspension 30 mL, Oral, Every 4 hours PRN    aspirin 81 mg, Oral, Daily    bisacodyl (DULCOLAX) 10 mg, Rectal, Daily    calcium carbonate (TUMS) 500 mg chewable tablet 1 tablet, Oral, Daily    cholecalciferol (VITAMIN D3) 2,000 Units, Oral, Daily    ciprofloxacin (CIPRO) 500 mg tablet No dose, route, or frequency recorded.    Diclofenac Sodium (VOLTAREN) 1 % No dose, route, or frequency recorded.    docusate sodium (COLACE) 100 mg, Oral, 2 times daily    famotidine (PEPCID) 20 mg, Oral, Daily    ferrous sulfate 325 mg, Oral, Daily with breakfast    furosemide (LASIX) 10 mg, Daily    lamoTRIgine (LAMICTAL) 200 mg, Oral, 2 times daily    levETIRAcetam (KEPPRA) 1,000 mg, Oral, 2 times daily    magnesium hydroxide (MILK OF MAGNESIA) 400 mg/5 mL oral suspension Oral, Daily PRN    Melatonin 5 mg, Oral    mirtazapine (REMERON) 15 mg, Oral, Daily at bedtime    Multiple Vitamin (multivitamin) tablet 1 tablet, Oral, Daily    ondansetron (ZOFRAN) 4 mg tablet PRN    POTASSIUM CHLORIDE ER PO 10 mEq, Oral    senna-docusate sodium (SENOKOT-S) 8.6-50 mg per tablet 2 tablets, Oral, Daily    sodium chloride 3 g, Oral, 3 times daily with meals    Sodium Phosphates (ENEMA DISPOSABLE RE) Rectal, Every 72 hours as needed    tamsulosin (FLOMAX) 0.4 mg, Oral, Daily with dinner    thiamine 100 mg, Oral, Daily    vitamin C 1,000 mg, Oral, Daily    Zinc 50 mg, Oral    No Known Allergies   Social History     Tobacco Use    Smoking  status: Former    Smokeless tobacco: Never   Substance Use Topics    Alcohol use: Never    Drug use: Never    Family History   Problem Relation Age of Onset    Cancer Father     Colon polyps Neg Hx     Colon cancer Neg Hx     Seizures Neg Hx           Objective :                   Vitals I/O      Most Recent Min/Max in 24hrs   Temp 98.8 °F (37.1 °C) Temp  Min: 98.1 °F (36.7 °C)  Max: 101.5 °F (38.6 °C)   Pulse 95 Pulse  Min: 94  Max: 126   Resp (!) 25 Resp  Min: 19  Max: 32   /56 BP  Min: 77/55  Max: 137/83   O2 Sat 95 % SpO2  Min: 93 %  Max: 98 %      Intake/Output Summary (Last 24 hours) at 3/5/2025 1055  Last data filed at 3/5/2025 1000  Gross per 24 hour   Intake 1000 ml   Output 1000 ml   Net 0 ml       Diet Regular; Regular House; Fluid Restriction 1800 ML    Invasive Monitoring           Physical Exam   Physical Exam  Vitals and nursing note reviewed.   Eyes:      Conjunctiva/sclera: Conjunctivae normal.      Pupils: Pupils are equal, round, and reactive to light.   Skin:     General: Skin is warm.   HENT:      Head: Normocephalic and atraumatic.      Mouth/Throat:      Mouth: Mucous membranes are moist.   Cardiovascular:      Rate and Rhythm: Normal rate and regular rhythm.      Heart sounds: Normal heart sounds.   Musculoskeletal:         General: Normal range of motion.      Comments: Bilateral lower extremity amputation   Abdominal: General: Bowel sounds are normal.      Palpations: Abdomen is soft.   Constitutional:       Appearance: He is well-developed. He is ill-appearing and diaphoretic. He is not toxic-appearing.   Pulmonary:      Effort: No respiratory distress.      Breath sounds: No wheezing, rhonchi or rales.      Comments: On room air with SpO2 94%, lung sounds clear to auscultation bilateral  Neurological:      General: No focal deficit present.      Mental Status: He is alert. Mental status is at baseline. He is calm.      Motor: gross motor function is at baseline for patient. Strength  full and intact in all extremities.   Genitourinary/Anorectal:  Anderson present.        Diagnostic Studies        Lab Results: I have reviewed the following results:     Medications:  Scheduled PRN   [START ON 3/6/2025] cefepime, 1,000 mg, Q24H  chlorhexidine, 15 mL, Q12H DANIELLE  heparin (porcine), 5,000 Units, Q8H DANIELLE  lamoTRIgine, 200 mg, BID  levETIRAcetam, 1,000 mg, Q12H DANIELLE  melatonin, 6 mg, HS  mirtazapine, 7.5 mg, HS  [START ON 3/6/2025] oseltamivir, 30 mg, Q24H  [START ON 3/6/2025] pantoprazole, 20 mg, Early Morning  [START ON 3/7/2025] vancomycin, 15 mg/kg, Q48H      calcium carbonate, 1,000 mg, BID PRN       Continuous    norepinephrine, 1-30 mcg/min, Last Rate: 7 mcg/min (03/05/25 1031)         Labs:   CBC    Recent Labs     03/05/25  0444   WBC 8.47   HGB 10.2*   HCT 30.5*      BANDSPCT 5     BMP    Recent Labs     03/05/25  0444   SODIUM 135   K 5.1      CO2 12*   AGAP 16*   *   CREATININE 6.38*   CALCIUM 9.0       Coags    Recent Labs     03/05/25  0611   INR 1.38*   PTT 38*        Additional Electrolytes  No recent results       Blood Gas    No recent results  No recent results LFTs  Recent Labs     03/05/25  0444   ALT 21   AST 36   ALKPHOS 62   ALB 3.3*   TBILI 0.33       Infectious  Recent Labs     03/05/25  0444   PROCALCITONI 14.58*     Glucose  Recent Labs     03/05/25  0444   GLUC 98        Administrative Statements

## 2025-03-05 NOTE — ASSESSMENT & PLAN NOTE
UA on admission suggestive of UTI  Anderson placed in ER and noted to have hematuria   Started on cefepime and vancomycin in the ER  Continue close monitoring of I/O status and renal function daily  Continue antibiotics  Follow-up cultures  Check renal ultrasound

## 2025-03-05 NOTE — Clinical Note
Case was discussed with AB and the patient's admission status was agreed to be Admission Status: inpatient status to the service of Dr. Edward .

## 2025-03-05 NOTE — ED CARE HANDOFF
Emergency Department Sign Out Note        Sign out and transfer of care from previous provider. See Separate Emergency Department note.     The patient, Trevor Waldrop, was evaluated by the previous provider for fever.    Workup Completed:  Blood work    ED Course / Workup Pending (followup):  CT scan                            Critical Care Time Statement: Upon my evaluation, this patient had a high probability of imminent or life-threatening deterioration due to AVR sepsis, which required my direct attention, intervention, and personal management.  I spent a total of 65 minutes directly providing critical care services, including interpretation of complex medical databases, evaluating for the presence of life-threatening injuries or illnesses, management of organ system failure(s) , complex medical decision making (to support/prevent further life-threatening deterioration)., interpretation of hemodynamic data, titration of vasoactive medications, and titration of continuous IV medications (drips). This time is exclusive of procedures, teaching, treating other patients, family meetings, and any prior time recorded by providers other than myself.         ED Course as of 03/08/25 0915   Wed Mar 05, 2025   0831 Patient was evaluated by hospitalist however patient's blood pressure started to drop again.  Patient has already received 30 cc/kg IV fluid bolus.  Critical care was consulted for further evaluation and management.  Patient is currently pending critical care evaluation.   0900 Patient evaluated by ICU team.  At this time patient will be admitted to critical care for further evaluation and management.     Procedures  Medical Decision Making  Patient's lab work showed concern for UTI.  CT scan showed concern for pneumonia as well as cystitis, urinary retention, hydronephrosis.  After receiving 30 cc/kg bolus of IV fluids, patient continued to have hypotension.  Previous provider did reach out to them service who  came and evaluated patient.  After evaluation, hospitalist recommended critical care evaluation as patient's blood pressure started to drop again.  Pressors were started as part of sepsis protocol.  Patient was seen by ICU team who recommended admit to ICU for further evaluation management.  Patient agrees with admission plans.    Amount and/or Complexity of Data Reviewed  Labs: ordered.  Radiology: ordered.    Risk  Prescription drug management.  Decision regarding hospitalization.            Disposition  Final diagnoses:   Influenza A   JOHNY (acute kidney injury) (HCC)   Metabolic acidosis   Pneumonia   UTI (urinary tract infection)   Severe sepsis (HCC)   Urinary retention     Time reflects when diagnosis was documented in both MDM as applicable and the Disposition within this note       Time User Action Codes Description Comment    3/5/2025  5:36 AM John, Nury Add [J10.1] Influenza A     3/5/2025  5:36 AM John, Nury Add [N17.9] JOHNY (acute kidney injury) (HCC)     3/5/2025  5:37 AM John, Nury Add [E87.20] Metabolic acidosis     3/5/2025  6:02 AM John, Nury Add [J18.9] Pneumonia     3/5/2025  6:53 AM John, Nury Add [N39.0] UTI (urinary tract infection)     3/5/2025  6:53 AM John, Nury Add [A41.9,  R65.20] Severe sepsis (HCC)     3/5/2025  7:30 AM John, Nury Add [R33.9] Urinary retention     3/5/2025  9:53 AM Charlene Lantigua Add [N30.01] Acute cystitis with hematuria     3/7/2025 11:14 AM Linda Koroma Add [N13.39] Other hydronephrosis           ED Disposition       ED Disposition   Admit    Condition   Stable    Date/Time   Wed Mar 5, 2025  9:01 AM    Comment   Case was discussed with ICU ROQUE and the patient's admission status was agreed to be Admission Status: inpatient status to the service of Dr. Chang.               Follow-up Information       Follow up With Specialties Details Why Contact Info Additional Information    [facility] Follow up in 4 week(s) Service will  contact patient/caregiver with hospital follow-up appointment date and time once discharged. [address]  [address]  [facility] [address] [phone] [facility] [place], [address], [address], [place], [place], [address] [phone]          Current Discharge Medication List        CONTINUE these medications which have NOT CHANGED    Details   aspirin 81 mg chewable tablet Chew 81 mg daily      cholecalciferol (VITAMIN D3) 1,000 units tablet Take 2,000 Units by mouth daily      famotidine (PEPCID) 20 mg tablet Take 1 tablet (20 mg total) by mouth daily  Qty:  , Refills: 0    Associated Diagnoses: Gastroesophageal reflux disease, unspecified whether esophagitis present      ferrous sulfate 325 (65 Fe) mg tablet Take 325 mg by mouth daily with breakfast      lamoTRIgine (LaMICtal) 200 MG tablet Take 200 mg by mouth 2 (two) times a day       levETIRAcetam (KEPPRA) 1000 MG tablet Take 1 tablet (1,000 mg total) by mouth 2 (two) times a day    Associated Diagnoses: Localization-related (focal) (partial) symptomatic epilepsy and epileptic syndromes with complex partial seizures, not intractable, with status epilepticus (HCC)      mirtazapine (REMERON) 7.5 MG tablet Take 15 mg by mouth daily at bedtime       Multiple Vitamin (multivitamin) tablet Take 1 tablet by mouth daily      POTASSIUM CHLORIDE ER PO Take 10 mEq by mouth      senna-docusate sodium (SENOKOT-S) 8.6-50 mg per tablet Take 2 tablets by mouth daily      sodium chloride 1 g tablet Take 3 tablets (3 g total) by mouth 3 (three) times a day with meals  Qty:  , Refills: 0    Associated Diagnoses: Hyponatremia      tamsulosin (FLOMAX) 0.4 mg Take 1 capsule (0.4 mg total) by mouth daily with dinner  Qty: 90 capsule, Refills: 3    Associated Diagnoses: Urinary retention due to benign prostatic hyperplasia      thiamine 100 MG tablet Take 100 mg by mouth daily      Zinc 50 MG TABS Take 50 mg by mouth      acetaminophen  (TYLENOL) 325 mg tablet Take 1,000 mg by mouth 2 (two) times a day Chronic back pain      aluminum-magnesium hydroxide-simethicone (MYLANTA) 200-200-20 mg/5 mL suspension Take 30 mL by mouth every 4 (four) hours as needed for indigestion or heartburn      Ascorbic Acid (vitamin C) 1000 MG tablet Take 1,000 mg by mouth daily      bisacodyl (DULCOLAX) 10 mg suppository Insert 10 mg into the rectum daily      calcium carbonate (TUMS) 500 mg chewable tablet Chew 2 tablets every 4 (four) hours as needed for heartburn      ciprofloxacin (CIPRO) 500 mg tablet       Diclofenac Sodium (VOLTAREN) 1 %       docusate sodium (COLACE) 100 mg capsule Take 1 capsule (100 mg total) by mouth 2 (two) times a day  Qty: 10 capsule, Refills: 0    Associated Diagnoses: Colon cancer screening      furosemide (LASIX) 20 mg tablet Take 10 mg by mouth daily      magnesium hydroxide (MILK OF MAGNESIA) 400 mg/5 mL oral suspension Take by mouth daily as needed for constipation      Melatonin 5 MG CAPS Take 5 mg by mouth      midodrine (PROAMATINE) 5 mg tablet Take 5 mg by mouth every 12 (twelve) hours Prn for hypotension      ondansetron (ZOFRAN) 4 mg tablet 4 mg 2 (two) times a day PRN prior to sodium tablets      Sodium Phosphates (ENEMA DISPOSABLE RE) Insert into the rectum Every 72 hours as needed             No discharge procedures on file.       ED Provider  Electronically Signed by     Odalis Huitron DO  03/08/25 0915

## 2025-03-05 NOTE — ASSESSMENT & PLAN NOTE
Patient with history of seizures, follows with neurology  Continuing home Lamictal 200 mg twice daily and Keppra 1000 mg twice daily

## 2025-03-05 NOTE — ED PROVIDER NOTES
Time reflects when diagnosis was documented in both MDM as applicable and the Disposition within this note       Time User Action Codes Description Comment    3/5/2025  5:36 AM JohnNury Add [J10.1] Influenza A     3/5/2025  5:36 AM John Nury Add [N17.9] JOHNY (acute kidney injury) (HCC)     3/5/2025  5:37 AM John, Nury Add [E87.20] Metabolic acidosis     3/5/2025  6:02 AM John Nury Add [J18.9] Pneumonia     3/5/2025  6:53 AM John, Nury Add [N39.0] UTI (urinary tract infection)     3/5/2025  6:53 AM John Nury Add [A41.9,  R65.20] Severe sepsis (HCC)     3/5/2025  7:30 AM John, Nury Add [R33.9] Urinary retention     3/5/2025  9:53 AM Charlene Lantigua Add [N30.01] Acute cystitis with hematuria           ED Disposition       ED Disposition   Admit    Condition   Stable    Date/Time   Wed Mar 5, 2025  9:01 AM    Comment   Case was discussed with ICU ROQUE and the patient's admission status was agreed to be Admission Status: inpatient status to the service of Dr. Chang.               Assessment & Plan       Medical Decision Making  60-year-old male with fever, will obtain lab work to evaluate for electrolyte abnormalities, dehydration, look for source of infection and is at risk for influenza given recent exposure at risk for pneumonia, less likely urinary tract infection, will obtain lab work to evaluate for electrolyte abnormalities, dehydration, anemia, endorgan dysfunction   EKG to evaluate for electrolyte abnormalities  Does have tenderness on his abdominal examination will obtain imaging to further evaluate for acute intra-abdominal pathology such as perforation, small bowel obstruction, diverticulitis, cholecystitis, appendicitis    Amount and/or Complexity of Data Reviewed  Labs: ordered. Decision-making details documented in ED Course.  Radiology: ordered.    Risk  Prescription drug management.  Decision regarding hospitalization.        ED Course as of 03/06/25 0305   Wed Mar 05, 2025   0430 Medical  record reviewed patient sees neurology most recent visit 12/27/2024 for partial seizures, there were issues with facility changing medications without neurology approval, patient is on lamotrigine and Keppra.  Neurology recommendations patient is supposed to be  on keppra 1000 mg BID and  lamotrigine 200 mg BID   0524 Procalcitonin(!): 14.58   0524 Creatinine(!): 6.38   0536 INFLU A PCR(!): Positive   0610 Procedure Note - Ultrasound Guided Peripheral IV    Ultrasound dynamic guidance was used for peripheral line insertion.    Risks and benefits of the procedure were discussed with the patient.  Discussed risks included pain with the procedure, bleeding, and risk of infection.    Indication: Difficult non-ultrasound guided peripheral intravenous line insertion.    Location: Laterally: right upper extremity.    Procedure: The patient was prepped using standard ultrasound guided IV procedures.  Using direct visualization of the intravenous catheter/needle, the vessel was successfully cannulated with return of blood and advancement of the catheter.  The catheter was secured in the standard technique.    Complications: None.    Interpretation: Successful ultrasound guided peripheral IV.    This is a billable ultrasound guided procedure.    Ultrasound images stored on the [employer] ultrasound image , or as an attachment to this chart.      0627 Patient currently receiving fluids, initially did have 2 recorded episodes of hypotension, he is very dry at this point and the hypotension episodes were prior to administration of any significant amount of fluids due to one of his IVs not functioning well, I have pain another large-bore IV in the right upper extremity, patient currently receiving fluid hydration 30 cc/kg and his current MAP is 69 will hold off on pressors at this point   0632 Critical Care Time Statement: Upon my evaluation, this patient had a high probability of imminent or life-threatening deterioration due  to Severe Sepsis, which required my direct attention, intervention, and personal management.  I spent a total of 41 minutes directly providing critical care services, including interpretation of complex medical databases, evaluating for the presence of life-threatening injuries or illnesses, management of organ system failure(s) , complex medical decision making (to support/prevent further life-threatening deterioration)., and interpretation of hemodynamic data. This time is exclusive of procedures, teaching, treating other patients, family meetings, and any prior time recorded by providers other than myself.           Medications   sodium chloride 0.9 % bolus 1,000 mL (0 mL Intravenous Stopped 3/5/25 0620)   acetaminophen (Ofirmev) injection 1,000 mg (0 mg Intravenous Stopped 3/5/25 0630)   cefepime (MAXIPIME) IVPB (premix in dextrose) 1,000 mg 50 mL (0 mg Intravenous Stopped 3/5/25 0615)   vancomycin (VANCOCIN) IVPB (premix in dextrose) 1,000 mg 200 mL (0 mg Intravenous Stopped 3/5/25 0933)   sodium chloride 0.9 % bolus 1,000 mL (0 mL Intravenous Stopped 3/5/25 0755)   oseltamivir (TAMIFLU) capsule 30 mg (30 mg Oral Given 3/5/25 0643)   lactated ringers bolus 1,000 mL (1,000 mL Intravenous New Bag 3/5/25 0933)       ED Risk Strat Scores                            SBIRT 22yo+      Flowsheet Row Most Recent Value   Initial Alcohol Screen: US AUDIT-C     1. How often do you have a drink containing alcohol? 0 Filed at: 03/05/2025 0630   2. How many drinks containing alcohol do you have on a typical day you are drinking?  0 Filed at: 03/05/2025 0630   3a. Male UNDER 65: How often do you have five or more drinks on one occasion? 0 Filed at: 03/05/2025 0630   3b. FEMALE Any Age, or MALE 65+: How often do you have 4 or more drinks on one occassion? 0 Filed at: 03/05/2025 0444   Audit-C Score 0 Filed at: 03/05/2025 0630   EZEKIEL: How many times in the past year have you...    Used an illegal drug or used a prescription  medication for non-medical reasons? Never Filed at: 03/05/2025 0630                            History of Present Illness       Chief Complaint   Patient presents with    Fever     Patient sent from [facility]. Flu s/sx x 2 days with fever.  APAP at 0115 and subcu fluids given at [facility]       Past Medical History:   Diagnosis Date    Alcohol abuse     Anemia 6/14/2019    On iron.  Unclear if ever documented iron deficient    GERD (gastroesophageal reflux disease)     GERD with esophagitis 6/14/2019    EGD in [facility] 2019    Hx of AKA (above knee amputation) (HCC)     Right    Infectious viral hepatitis     Seizures (HCC)     Seizures (HCC)     Ulcer of esophagus       Past Surgical History:   Procedure Laterality Date    BELOW KNEE LEG AMPUTATION      Left    EGD  04/25/2017    LUNG DECORTICATION Left       Family History   Problem Relation Age of Onset    Cancer Father     Colon polyps Neg Hx     Colon cancer Neg Hx     Seizures Neg Hx       Social History     Tobacco Use    Smoking status: Former     Passive exposure: Past    Smokeless tobacco: Never   Substance Use Topics    Alcohol use: Never    Drug use: Never      E-Cigarette/Vaping      E-Cigarette/Vaping Substances      I have reviewed and agree with the history as documented.     60-year-old male with a history of partial epilepsy, previous alcohol abuse currently residing at [facility] presents for evaluation of fever x 2 days, otherwise and has no specific complaints currently at mental baseline.  Per facility staff there is the flu that is going around the facility.  He has been receiving subcutaneous fluids for hydration        Review of Systems   Constitutional:  Positive for fatigue and fever.           Objective       ED Triage Vitals   Temperature Pulse Blood Pressure Respirations SpO2 Patient Position - Orthostatic VS   03/05/25 0432 03/05/25 0432 03/05/25 0432 03/05/25 0545 03/05/25 0432 03/05/25 0700   (!) 101.5  °F (38.6 °C) (!) 126 94/56 (!) 24 94 % Lying      Temp Source Heart Rate Source BP Location FiO2 (%) Pain Score    03/05/25 0432 03/05/25 0815 03/05/25 0700 -- 03/05/25 0432    Rectal Monitor Right arm  No Pain      Vitals      Date and Time Temp Pulse SpO2 Resp BP Pain Score FACES Pain Rating User   03/06/25 0300 100.2 °F (37.9 °C) 104 100 % 28 85/50 -- -- AG   03/06/25 0212 100.6 °F (38.1 °C) 107 99 % 27 82/47 -- -- AG   03/06/25 0200 100.8 °F (38.2 °C) 109 99 % 20 83/46 -- -- AG   03/06/25 0100 100.9 °F (38.3 °C) 114 100 % 22 97/55 -- -- AG   03/06/25 0043 -- -- -- -- -- Med Not Given for Pain - for MAR use only -- AG   03/06/25 0042 100.9 °F (38.3 °C) 113 99 % 24 96/53 -- -- AG   03/05/25 2300 100 °F (37.8 °C) 112 98 % 21 104/58 No Pain -- AG   03/05/25 2223 100 °F (37.8 °C) 114 96 % 22 -- -- -- AH   03/05/25 2200 100 °F (37.8 °C) 116 97 % 19 108/54 -- -- AG   03/05/25 2130 100 °F (37.8 °C) 116 96 % 30 89/57 ccap aware -- -- AG   03/05/25 2100 100.2 °F (37.9 °C) 110 96 % 19 85/50 -- -- AG   03/05/25 2011 100.4 °F (38 °C) 116 96 % 24 92/50 -- -- AG   03/05/25 1916 101.1 °F (38.4 °C) 113 96 % 22 105/59 No Pain -- AG   03/05/25 1808 -- -- -- -- -- Med Not Given for Pain - for MAR use only -- EE   03/05/25 1800 100.9 °F (38.3 °C) 115 97 % 17 114/63 -- -- EE   03/05/25 1715 100 °F (37.8 °C) 111 98 % 18 113/60 -- -- EE   03/05/25 1700 -- -- 98 % -- -- -- -- EE   03/05/25 1600 99.3 °F (37.4 °C) 101 98 % 26 101/64 No Pain -- EE   03/05/25 1500 98.6 °F (37 °C) 99 99 % 20 97/60 -- -- EE   03/05/25 1415 98.4 °F (36.9 °C) 95 99 % 23 104/59 -- -- EE   03/05/25 1400 98.2 °F (36.8 °C) 94 99 % 23 103/58 -- -- EE   03/05/25 1300 97.9 °F (36.6 °C) 95 98 % 22 110/64 -- -- EE   03/05/25 1245 97.7 °F (36.5 °C) 96 99 % 30 112/67 -- -- EE   03/05/25 1230 97.7 °F (36.5 °C) 97 98 % 29 112/64 -- -- EE   03/05/25 1215 97.5 °F (36.4 °C) 97 98 % 20 110/64 -- -- EE   03/05/25 1200 97.3 °F (36.3 °C) 94 97 % 17 107/60 -- -- EE   03/05/25 1145  97.3 °F (36.3 °C) 91 98 % 25 113/65 -- -- EE   03/05/25 1130 97.3 °F (36.3 °C) 84 95 % 15 100/58 -- -- EE   03/05/25 1115 97.5 °F (36.4 °C) 85 95 % 15 97/59 -- -- EE   03/05/25 1100 98.1 °F (36.7 °C) 84 96 % 15 86/52 -- -- EE   03/05/25 1045 98.2 °F (36.8 °C) 86 97 % 16 86/51 -- -- EE   03/05/25 1030 98.6 °F (37 °C) 87 95 % 16 82/51 -- -- EE   03/05/25 1015 98.8 °F (37.1 °C) 95 95 % 25 100/56 -- -- EE   03/05/25 1000 98.1 °F (36.7 °C) 103 93 % 32 92/50 No Pain -- EE   03/05/25 0930 99 °F (37.2 °C) 97 95 % 22 101/56 No Pain -- CAC   03/05/25 0915 -- 102 -- -- 90/52 -- -- CAC   03/05/25 0900 99.1 °F (37.3 °C) 100 94 % 21 95/50 No Pain -- CAC   03/05/25 0845 99.1 °F (37.3 °C) 101 94 % 27 91/61 No Pain -- CAC   03/05/25 0830 -- 94 94 % 23 94/62 No Pain -- CAC   03/05/25 0815 99.1 °F (37.3 °C) 101 94 % 22 80/52 No Pain -- CAC   03/05/25 0800 99 °F (37.2 °C) 101 94 % 22 85/43 No Pain -- CAC   03/05/25 0745 99 °F (37.2 °C) 106 93 % 21 137/83 No Pain -- CAC   03/05/25 0730 99 °F (37.2 °C) 104 94 % 22 89/50 No Pain -- CAC   03/05/25 0715 99 °F (37.2 °C) 103 93 % 21 97/69 No Pain -- CAC   03/05/25 0700 98.8 °F (37.1 °C) 105 94 % 19 92/54 No Pain -- CAC   03/05/25 0645 99.1 °F (37.3 °C) 105 95 % 24 104/63 -- -- SV   03/05/25 0630 99.1 °F (37.3 °C) 104 97 % 24 96/50 -- -- SV   03/05/25 0615 -- 105 97 % 22 90/53 -- -- SV   03/05/25 0600 -- 109 98 % 24 86/53 -- -- SV   03/05/25 0545 -- 117 94 % 24 77/55 -- -- SV   03/05/25 0432 101.5 °F (38.6 °C) 126 94 % -- 94/56 No Pain -- SV            Physical Exam  Vitals and nursing note reviewed.   Constitutional:       Appearance: He is well-developed.   HENT:      Head: Normocephalic and atraumatic.      Right Ear: External ear normal.      Left Ear: External ear normal.   Eyes:      Conjunctiva/sclera: Conjunctivae normal.      Pupils: Pupils are equal, round, and reactive to light.   Neck:      Vascular: No JVD.      Trachea: No tracheal deviation.   Cardiovascular:      Rate and  Rhythm: Regular rhythm. Tachycardia present.      Heart sounds: Normal heart sounds. No murmur heard.     No friction rub. No gallop.   Pulmonary:      Effort: Pulmonary effort is normal. No respiratory distress.      Breath sounds: No stridor. Rhonchi present. No wheezing or rales.   Abdominal:      General: There is no distension.      Palpations: Abdomen is soft. There is no mass.      Tenderness: There is abdominal tenderness. There is no guarding or rebound.      Comments: Tender diffusely   Genitourinary:     Penis: Normal.    Musculoskeletal:         General: Normal range of motion.      Cervical back: Normal range of motion and neck supple.      Comments: Bilateral lower extremities with old healed BKA's   Skin:     General: Skin is warm and dry.      Coloration: Skin is not pale.      Findings: No erythema or rash.   Neurological:      General: No focal deficit present.      Mental Status: He is alert. Mental status is at baseline.      Cranial Nerves: No cranial nerve deficit.         Results Reviewed       Procedure Component Value Units Date/Time    Blood culture #1 [[id number]] Collected: 03/05/25 0515    Lab Status: Preliminary result Specimen: Blood from Hand, Left Updated: 03/05/25 1355     Blood Culture Received in Microbiology Lab. Culture in Progress.    Blood culture #2 [[id number]] Collected: 03/05/25 0444    Lab Status: Preliminary result Specimen: Blood from Arm, Left Updated: 03/05/25 1301     Blood Culture Received in Microbiology Lab. Culture in Progress.    Phosphorus [[id number]]  (Normal) Collected: 03/05/25 0444    Lab Status: Final result Specimen: Blood from Arm, Left Updated: 03/05/25 1246     Phosphorus 4.0 mg/dL     Magnesium [[id number]]  (Normal) Collected: 03/05/25 0444    Lab Status: Final result Specimen: Blood from Arm, Left Updated: 03/05/25 1246     Magnesium 2.6 mg/dL     Levetiracetam level [[id number]]  (Abnormal) Collected: 03/05/25 0611    Lab Status: Final result Specimen:  "Blood from Arm, Right Updated: 03/05/25 1207     Levetiracetam Lvl 165.2 ug/mL     Narrative:      \"Brivaracetam (Briviact) interferes with measurements of levetiracetam in the ARK Levetiracetam Assay. Patients undergoing a switch in drug therapy involving Keppra and Briviact should not be monitored for levetiracetam using the ARK assay. Serum levels of levetiracetam and or brivaracetam should be confirmed by a valid chromatographic method if there is a possibility these drugs are co-present in circulation.\"    HS Troponin I 2hr [[id number]]  (Normal) Collected: 03/05/25 0631    Lab Status: Final result Specimen: Blood from Arm, Right Updated: 03/05/25 0700     hs TnI 2hr 11 ng/L      Delta 2hr hsTnI -2 ng/L     Urine Microscopic [[id number]]  (Abnormal) Collected: 03/05/25 0628    Lab Status: Final result Specimen: Urine, Indwelling Anderson Catheter Updated: 03/05/25 0644     RBC, UA 30-50 /hpf      WBC, UA 30-50 /hpf      Epithelial Cells None Seen /hpf      Bacteria, UA Innumerable /hpf     HS Troponin I 4hr [[id number]]     Lab Status: No result Specimen: Blood     Urine culture [[id number]] Collected: 03/05/25 0628    Lab Status: In process Specimen: Urine, Indwelling Anderson Catheter Updated: 03/05/25 0644    Protime-INR [[id number]]  (Abnormal) Collected: 03/05/25 0611    Lab Status: Final result Specimen: Blood from Arm, Right Updated: 03/05/25 0640     Protime 17.4 seconds      INR 1.38    Narrative:      INR Therapeutic Range    Indication                                             INR Range      Atrial Fibrillation                                               2.0-3.0  Hypercoagulable State                                    2.0.2.3  Left Ventricular Asist Device                            2.0-3.0  Mechanical Heart Valve                                  -    Aortic(with afib, MI, embolism, HF, LA enlargement,    and/or coagulopathy)                                     2.0-3.0 (2.5-3.5)     Mitral                  "                                            2.5-3.5  Prosthetic/Bioprosthetic Heart Valve               2.0-3.0  Venous thromboembolism (VTE: VT, PE        2.0-3.0    APTT [[id number]]  (Abnormal) Collected: 03/05/25 0611    Lab Status: Final result Specimen: Blood from Arm, Right Updated: 03/05/25 0640     PTT 38 seconds     UA w Reflex to Microscopic w Reflex to Culture [[id number]]  (Abnormal) Collected: 03/05/25 0628    Lab Status: Final result Specimen: Urine, Indwelling Anderson Catheter Updated: 03/05/25 0635     Color, UA Brown     Clarity, UA Cloudy     Specific Gravity, UA 1.020     pH, UA 8.0     Leukocytes, UA Large     Nitrite, UA Negative     Protein,  (2+) mg/dl      Glucose, UA Negative mg/dl      Ketones, UA Negative mg/dl      Urobilinogen, UA <2.0 mg/dl      Bilirubin, UA Negative     Occult Blood, UA Large    Lamotrigine level [[id number]] Collected: 03/05/25 0611    Lab Status: In process Specimen: Blood from Arm, Right Updated: 03/05/25 0618    RBC Morphology Reflex Test [[id number]] Collected: 03/05/25 0444    Lab Status: Final result Specimen: Blood from Arm, Left Updated: 03/05/25 0601    CBC and differential [[id number]]  (Abnormal) Collected: 03/05/25 0444    Lab Status: Final result Specimen: Blood from Arm, Left Updated: 03/05/25 0542     WBC 8.47 Thousand/uL      RBC 3.22 Million/uL      Hemoglobin 10.2 g/dL      Hematocrit 30.5 %      MCV 95 fL      MCH 31.7 pg      MCHC 33.4 g/dL      RDW 12.2 %      MPV 9.6 fL      Platelets 206 Thousands/uL     Narrative:      This is an appended report.  These results have been appended to a previously verified report.    Manual Differential(PHLEBS Do Not Order) [[id number]]  (Abnormal) Collected: 03/05/25 0444    Lab Status: Final result Specimen: Blood from Arm, Left Updated: 03/05/25 0542     Segmented % 82 %      Bands % 5 %      Lymphocytes % 13 %      Monocytes % 0 %      Eosinophils % 0 %      Basophils % 0 %      Absolute Neutrophils 7.37  Thousand/uL      Absolute Lymphocytes 1.10 Thousand/uL      Absolute Monocytes 0.00 Thousand/uL      Absolute Eosinophils 0.00 Thousand/uL      Absolute Basophils 0.00 Thousand/uL      Total Counted --     Toxic Granulation Present     RBC Morphology Normal     Platelet Estimate Adequate    CK [[id number]]  (Abnormal) Collected: 03/05/25 0444    Lab Status: Final result Specimen: Blood from Arm, Left Updated: 03/05/25 0542     Total  U/L     FLU/RSV/COVID - if FLU/RSV clinically relevant [[id number]]  (Abnormal) Collected: 03/05/25 0444    Lab Status: Final result Specimen: Nares from Nose Updated: 03/05/25 0535     SARS-CoV-2 Negative     INFLUENZA A PCR Positive     INFLUENZA B PCR Negative     RSV PCR Negative    Narrative:      This test has been performed using the CoV-2/Flu/RSV plus assay on the [employer] platform. This test has been validated by the  and verified by the performing laboratory.     This test is designed to amplify and detect the following: nucleocapsid (N), envelope (E), and RNA-dependent RNA polymerase (RdRP) genes of the SARS-CoV-2 genome; matrix (M), basic polymerase (PB2), and acidic protein (PA) segments of the influenza A genome; matrix (M) and non-structural protein (NS) segments of the influenza B genome, and the nucleocapsid genes of RSV A and RSV B.     Positive results are indicative of the presence of Flu A, Flu B, RSV, and/or SARS-CoV-2 RNA. Positive results for SARS-CoV-2 or suspected novel influenza should be reported to state, local, or federal health departments according to local reporting requirements.      All results should be assessed in conjunction with clinical presentation and other laboratory markers for clinical management.     FOR PEDIATRIC PATIENTS - copy/paste COVID Guidelines URL to browser: [email or web address]       Procalcitonin [[id number]]  (Abnormal) Collected: 03/05/25 0444    Lab  Status: Final result Specimen: Blood from Arm, Left Updated: 03/05/25 0521     Procalcitonin 14.58 ng/ml     HS Troponin 0hr (reflex protocol) [[id number]]  (Normal) Collected: 03/05/25 0444    Lab Status: Final result Specimen: Blood from Arm, Left Updated: 03/05/25 0521     hs TnI 0hr 13 ng/L     Comprehensive metabolic panel [[id number]]  (Abnormal) Collected: 03/05/25 0444    Lab Status: Final result Specimen: Blood from Arm, Left Updated: 03/05/25 0518     Sodium 135 mmol/L      Potassium 5.1 mmol/L      Chloride 107 mmol/L      CO2 12 mmol/L      ANION GAP 16 mmol/L       mg/dL      Creatinine 6.38 mg/dL      Glucose 98 mg/dL      Calcium 9.0 mg/dL      Corrected Calcium 9.6 mg/dL      AST 36 U/L      ALT 21 U/L      Alkaline Phosphatase 62 U/L      Total Protein 7.8 g/dL      Albumin 3.3 g/dL      Total Bilirubin 0.33 mg/dL      eGFR 8 ml/min/1.73sq m     Narrative:      National Kidney Disease Foundation guidelines for Chronic Kidney Disease (CKD):     Stage 1 with normal or high GFR (GFR > 90 mL/min/1.73 square meters)    Stage 2 Mild CKD (GFR = 60-89 mL/min/1.73 square meters)    Stage 3A Moderate CKD (GFR = 45-59 mL/min/1.73 square meters)    Stage 3B Moderate CKD (GFR = 30-44 mL/min/1.73 square meters)    Stage 4 Severe CKD (GFR = 15-29 mL/min/1.73 square meters)    Stage 5 End Stage CKD (GFR <15 mL/min/1.73 square meters)  Note: GFR calculation is accurate only with a steady state creatinine    Lipase [[id number]]  (Abnormal) Collected: 03/05/25 0444    Lab Status: Final result Specimen: Blood from Arm, Left Updated: 03/05/25 0518     Lipase <6 u/L     Lactic acid [[id number]]  (Normal) Collected: 03/05/25 0444    Lab Status: Final result Specimen: Blood from Arm, Left Updated: 03/05/25 0517     LACTIC ACID 1.1 mmol/L     Narrative:      Result may be elevated if tourniquet was used during collection.            CT chest abdomen pelvis wo contrast   Final Interpretation by Domo Hallman  MD Mihai (03/05 4810)      CT chest:      Left lower lobe airspace consolidation and bilateral multi lobar tree-in-bud infiltrates may represent pneumonia and/or aspiration.      Bilateral lower lobe segmental and subsegmental endobronchial opacification, left greater than right may represent mucous plugging and/or aspiration.      Stable right upper lobe cystic lesion with mural nodularity and left upper lobe spiculated lesion unchanged since November 2020. Noncontrast chest CT follow-up in 12 months is advised.      Additional chronic findings and negatives as above.      CT abdomen and pelvis:      Severe bilateral hydronephroureterosis extending through the distended bladder. No radiopaque urinary tract calculi or perinephric collection. Findings suggestive of acute on chronic urinary retention. Correlate with UA to exclude superimposed cystitis.      Cholelithiasis.      Additional chronic findings and negatives as above.      The study was marked in EPIC for immediate notification.            Workstation performed: [id number]         XR chest portable   Final Interpretation by Chiki Cruz MD (03/05 0561)      Bilateral infiltrates left greater than right suspicious for pneumonia.      The lesions seen on the prior CT are not visible on x-ray. Please refer to the report of the follow-up CT scan obtained after this x-ray for further details            Workstation performed: [id number]             Procedures    ED Medication and Procedure Management   Prior to Admission Medications   Prescriptions Last Dose Informant Patient Reported? Taking?   Ascorbic Acid (vitamin C) 1000 MG tablet Unknown Outside Facility (Specify) Yes No   Sig: Take 1,000 mg by mouth daily   Diclofenac Sodium (VOLTAREN) 1 % Unknown Outside Facility (Specify) Yes No   Melatonin 5 MG CAPS Unknown Outside Facility (Specify) Yes No   Sig: Take 5 mg by mouth   Multiple Vitamin (multivitamin) tablet 3/4/2025 Outside Facility  (Specify) Yes Yes   Sig: Take 1 tablet by mouth daily   POTASSIUM CHLORIDE ER PO 3/4/2025 Outside Facility (Specify) Yes Yes   Sig: Take 10 mEq by mouth   Sodium Phosphates (ENEMA DISPOSABLE RE) Unknown Outside Facility (Specify) Yes No   Sig: Insert into the rectum Every 72 hours as needed     Zinc 50 MG TABS 3/4/2025 Outside Facility (Specify) Yes Yes   Sig: Take 50 mg by mouth   acetaminophen (TYLENOL) 325 mg tablet Unknown Outside Facility (Specify) Yes No   Sig: Take 1,000 mg by mouth 2 (two) times a day Chronic back pain   aluminum-magnesium hydroxide-simethicone (MYLANTA) 200-200-20 mg/5 mL suspension Unknown Outside Facility (Specify) Yes No   Sig: Take 30 mL by mouth every 4 (four) hours as needed for indigestion or heartburn   aspirin 81 mg chewable tablet 3/4/2025 Outside Facility (Specify) Yes Yes   Sig: Chew 81 mg daily   bisacodyl (DULCOLAX) 10 mg suppository Unknown Outside Facility (Specify) Yes No   Sig: Insert 10 mg into the rectum daily   calcium carbonate (TUMS) 500 mg chewable tablet Unknown Outside Facility (Specify) Yes No   Sig: Chew 2 tablets every 4 (four) hours as needed for heartburn   cholecalciferol (VITAMIN D3) 1,000 units tablet 3/4/2025 Outside Facility (Specify) Yes Yes   Sig: Take 2,000 Units by mouth daily   ciprofloxacin (CIPRO) 500 mg tablet Not Taking Outside Facility (Specify) Yes No   Patient not taking: Reported on 9/22/2023   docusate sodium (COLACE) 100 mg capsule Unknown Outside Facility (Specify) No No   Sig: Take 1 capsule (100 mg total) by mouth 2 (two) times a day   famotidine (PEPCID) 20 mg tablet 3/4/2025 Outside Facility (Specify) No Yes   Sig: Take 1 tablet (20 mg total) by mouth daily   ferrous sulfate 325 (65 Fe) mg tablet 3/4/2025 Outside Facility (Specify) Yes Yes   Sig: Take 325 mg by mouth daily with breakfast   furosemide (LASIX) 20 mg tablet Unknown Outside Facility (Specify) Yes No   Sig: Take 10 mg by mouth daily   Patient not taking: Reported on  2023   lamoTRIgine (LaMICtal) 200 MG tablet 3/4/2025 Outside Facility (Specify) Yes Yes   Sig: Take 200 mg by mouth 2 (two) times a day    levETIRAcetam (KEPPRA) 1000 MG tablet 3/4/2025  No Yes   Sig: Take 1 tablet (1,000 mg total) by mouth 2 (two) times a day   magnesium hydroxide (MILK OF MAGNESIA) 400 mg/5 mL oral suspension Unknown Outside Facility (Specify) Yes No   Sig: Take by mouth daily as needed for constipation   midodrine (PROAMATINE) 5 mg tablet Unknown  Yes No   Sig: Take 5 mg by mouth every 12 (twelve) hours Prn for hypotension   mirtazapine (REMERON) 7.5 MG tablet 3/4/2025 Outside Facility (Specify) Yes Yes   Sig: Take 15 mg by mouth daily at bedtime    ondansetron (ZOFRAN) 4 mg tablet Unknown Outside Facility (Specify) Yes No   Si mg 2 (two) times a day PRN prior to sodium tablets   senna-docusate sodium (SENOKOT-S) 8.6-50 mg per tablet 3/4/2025 Outside Facility (Specify) Yes Yes   Sig: Take 2 tablets by mouth daily   sodium chloride 1 g tablet 3/4/2025 Outside Facility (Specify) No Yes   Sig: Take 3 tablets (3 g total) by mouth 3 (three) times a day with meals   Patient taking differently: Take 2 g by mouth 2 (two) times a day Must crush   tamsulosin (FLOMAX) 0.4 mg 3/4/2025 Outside Facility (Specify) No Yes   Sig: Take 1 capsule (0.4 mg total) by mouth daily with dinner   thiamine 100 MG tablet 3/4/2025 Outside Facility (Specify) Yes Yes   Sig: Take 100 mg by mouth daily      Facility-Administered Medications: None     Current Discharge Medication List        CONTINUE these medications which have NOT CHANGED    Details   aspirin 81 mg chewable tablet Chew 81 mg daily      cholecalciferol (VITAMIN D3) 1,000 units tablet Take 2,000 Units by mouth daily      famotidine (PEPCID) 20 mg tablet Take 1 tablet (20 mg total) by mouth daily  Qty:  , Refills: 0    Associated Diagnoses: Gastroesophageal reflux disease, unspecified whether esophagitis present      ferrous sulfate 325 (65 Fe) mg tablet  Take 325 mg by mouth daily with breakfast      lamoTRIgine (LaMICtal) 200 MG tablet Take 200 mg by mouth 2 (two) times a day       levETIRAcetam (KEPPRA) 1000 MG tablet Take 1 tablet (1,000 mg total) by mouth 2 (two) times a day    Associated Diagnoses: Localization-related (focal) (partial) symptomatic epilepsy and epileptic syndromes with complex partial seizures, not intractable, with status epilepticus (HCC)      mirtazapine (REMERON) 7.5 MG tablet Take 15 mg by mouth daily at bedtime       Multiple Vitamin (multivitamin) tablet Take 1 tablet by mouth daily      POTASSIUM CHLORIDE ER PO Take 10 mEq by mouth      senna-docusate sodium (SENOKOT-S) 8.6-50 mg per tablet Take 2 tablets by mouth daily      sodium chloride 1 g tablet Take 3 tablets (3 g total) by mouth 3 (three) times a day with meals  Qty:  , Refills: 0    Associated Diagnoses: Hyponatremia      tamsulosin (FLOMAX) 0.4 mg Take 1 capsule (0.4 mg total) by mouth daily with dinner  Qty: 90 capsule, Refills: 3    Associated Diagnoses: Urinary retention due to benign prostatic hyperplasia      thiamine 100 MG tablet Take 100 mg by mouth daily      Zinc 50 MG TABS Take 50 mg by mouth      acetaminophen (TYLENOL) 325 mg tablet Take 1,000 mg by mouth 2 (two) times a day Chronic back pain      aluminum-magnesium hydroxide-simethicone (MYLANTA) 200-200-20 mg/5 mL suspension Take 30 mL by mouth every 4 (four) hours as needed for indigestion or heartburn      Ascorbic Acid (vitamin C) 1000 MG tablet Take 1,000 mg by mouth daily      bisacodyl (DULCOLAX) 10 mg suppository Insert 10 mg into the rectum daily      calcium carbonate (TUMS) 500 mg chewable tablet Chew 2 tablets every 4 (four) hours as needed for heartburn      ciprofloxacin (CIPRO) 500 mg tablet       Diclofenac Sodium (VOLTAREN) 1 %       docusate sodium (COLACE) 100 mg capsule Take 1 capsule (100 mg total) by mouth 2 (two) times a day  Qty: 10 capsule, Refills: 0    Associated Diagnoses: Colon cancer  screening      furosemide (LASIX) 20 mg tablet Take 10 mg by mouth daily      magnesium hydroxide (MILK OF MAGNESIA) 400 mg/5 mL oral suspension Take by mouth daily as needed for constipation      Melatonin 5 MG CAPS Take 5 mg by mouth      midodrine (PROAMATINE) 5 mg tablet Take 5 mg by mouth every 12 (twelve) hours Prn for hypotension      ondansetron (ZOFRAN) 4 mg tablet 4 mg 2 (two) times a day PRN prior to sodium tablets      Sodium Phosphates (ENEMA DISPOSABLE RE) Insert into the rectum Every 72 hours as needed             No discharge procedures on file.  ED SEPSIS DOCUMENTATION   Time reflects when diagnosis was documented in both MDM as applicable and the Disposition within this note       Time User Action Codes Description Comment    3/5/2025  5:36 AM Nury John Add [J10.1] Influenza A     3/5/2025  5:36 AM Nury John Add [N17.9] JOHNY (acute kidney injury) (HCC)     3/5/2025  5:37 AM Nury John Add [E87.20] Metabolic acidosis     3/5/2025  6:02 AM Nury John Add [J18.9] Pneumonia     3/5/2025  6:53 AM Nury John Add [N39.0] UTI (urinary tract infection)     3/5/2025  6:53 AM Nury John Add [A41.9,  R65.20] Severe sepsis (HCC)     3/5/2025  7:30 AM Nury John Add [R33.9] Urinary retention     3/5/2025  9:53 AM Charlene Lantigua Add [N30.01] Acute cystitis with hematuria            Initial Sepsis Screening       Row Name 03/05/25 0537                Is the patient's history suggestive of a new or worsening infection? Yes (Proceed)  -EB        Suspected source of infection suspect infection, source unknown  -EB        Indicate SIRS criteria Tachycardia > 90 bpm;Hyperthemia > 38.3C (100.9F) OR Hypothermia <36C (96.8F)  -EB        Are two or more of the above signs & symptoms of infection both present and new to the patient? Yes (Proceed)  -EB        Assess for evidence of organ dysfunction: Are any of the below criteria present within 6 hours of suspected infection and SIRS criteria that are NOT  "considered to be chronic conditions? Creatinine > 2.0 AND > 0.5 above baseline  -EB        Date of presentation of severe sepsis 03/05/25  -EB        Time of presentation of severe sepsis 0537  -EB        Sepsis Note: Click \"NEXT\" below (NOT \"close\") to generate sepsis note based on above information. YES (proceed by clicking \"NEXT\")  -EB                  User Key  (r) = Recorded By, (t) = Taken By, (c) = Cosigned By      Initials Name Provider Type    RODNEY John DO Physician                  Default Flowsheet Data (Last 720 Hours)       Sepsis Reassess       Row Name 03/05/25 0643 03/05/25 0629 03/05/25 0538             Repeat Volume Status and Tissue Perfusion Assessment Performed    Date of Reassessment: 03/05/25  -EB 03/05/25  -EB 03/05/25  -      Time of Reassessment: -- 0629  -EB 0538  -EB      Sepsis Reassessment Note: Click \"NEXT\" below (NOT \"close\") to generate sepsis reassessment note. -- YES (proceed by clicking \"NEXT\")  -EB YES (proceed by clicking \"NEXT\")  -EB      Repeat Volume Status and Tissue Perfusion Assessment Performed -- -- --                User Key  (r) = Recorded By, (t) = Taken By, (c) = Cosigned By      Initials Name Provider Type    DO Physician Nury Handley DO  03/06/25 0305    "

## 2025-03-05 NOTE — SEPSIS NOTE
"  Sepsis Note   Trevor Waldrop 60 y.o. male MRN: [id number]  Unit/Bed#: ED 02 Encounter: [id number]       Initial Sepsis Screening       Row Name 03/05/25 0537                Is the patient's history suggestive of a new or worsening infection? Yes (Proceed)  -EB        Suspected source of infection suspect infection, source unknown  -EB        Indicate SIRS criteria Tachycardia > 90 bpm;Hyperthemia > 38.3C (100.9F) OR Hypothermia <36C (96.8F)  -EB        Are two or more of the above signs & symptoms of infection both present and new to the patient? Yes (Proceed)  -EB        Assess for evidence of organ dysfunction: Are any of the below criteria present within 6 hours of suspected infection and SIRS criteria that are NOT considered to be chronic conditions? Creatinine > 2.0 AND > 0.5 above baseline  -EB        Date of presentation of severe sepsis 03/05/25  -EB        Time of presentation of severe sepsis 0537  -EB        Sepsis Note: Click \"NEXT\" below (NOT \"close\") to generate sepsis note based on above information. YES (proceed by clicking \"NEXT\")  -EB                  User Key  (r) = Recorded By, (t) = Taken By, (c) = Cosigned By      Initials Name Provider Type    RODNEY John DO Physician                    Default Flowsheet Data (Last 720 Hours)       Sepsis Reassess       Row Name 03/05/25 0538                   Repeat Volume Status and Tissue Perfusion Assessment Performed    Date of Reassessment: 03/05/25  -EB        Time of Reassessment: 0538  -EB        Sepsis Reassessment Note: Click \"NEXT\" below (NOT \"close\") to generate sepsis reassessment note. YES (proceed by clicking \"NEXT\")  -EB        Repeat Volume Status and Tissue Perfusion Assessment Performed --                  User Key  (r) = Recorded By, (t) = Taken By, (c) = Cosigned By      Initials Name Provider Type    RODNEY John DO Physician                    Body mass index is 27.59 kg/m².  Wt Readings from Last 1 Encounters:   03/05/25 50 kg " (110 lb 3.7 oz)     IBW (Ideal Body Weight): 33.9 kg    Ideal body weight: 41.7 kg (91 lb 15.6 oz)  Adjusted ideal body weight: 45 kg (99 lb 4.4 oz)

## 2025-03-05 NOTE — SEPSIS NOTE
"  Sepsis Note   Trevor Waldrop 60 y.o. male MRN: [id number]  Unit/Bed#: ED 02 Encounter: [id number]       Initial Sepsis Screening       Row Name 03/05/25 0537                Is the patient's history suggestive of a new or worsening infection? Yes (Proceed)  -EB        Suspected source of infection suspect infection, source unknown  -EB        Indicate SIRS criteria Tachycardia > 90 bpm;Hyperthemia > 38.3C (100.9F) OR Hypothermia <36C (96.8F)  -EB        Are two or more of the above signs & symptoms of infection both present and new to the patient? Yes (Proceed)  -EB        Assess for evidence of organ dysfunction: Are any of the below criteria present within 6 hours of suspected infection and SIRS criteria that are NOT considered to be chronic conditions? Creatinine > 2.0 AND > 0.5 above baseline  -EB        Date of presentation of severe sepsis 03/05/25  -EB        Time of presentation of severe sepsis 0537  -EB        Sepsis Note: Click \"NEXT\" below (NOT \"close\") to generate sepsis note based on above information. YES (proceed by clicking \"NEXT\")  -EB                  User Key  (r) = Recorded By, (t) = Taken By, (c) = Cosigned By      Initials Name Provider Type    RODNEY John DO Physician                    Default Flowsheet Data (Last 720 Hours)       Sepsis Reassess       Row Name 03/05/25 0629 03/05/25 0538                Repeat Volume Status and Tissue Perfusion Assessment Performed    Date of Reassessment: 03/05/25  -EB 03/05/25  -EB       Time of Reassessment: 0629  -EB 0538  -EB       Sepsis Reassessment Note: Click \"NEXT\" below (NOT \"close\") to generate sepsis reassessment note. YES (proceed by clicking \"NEXT\")  -EB YES (proceed by clicking \"NEXT\")  -EB       Repeat Volume Status and Tissue Perfusion Assessment Performed -- --                 User Key  (r) = Recorded By, (t) = Taken By, (c) = Cosigned By      Initials Name Provider Type    RODNEY John DO Physician                    Body mass index " is 27.59 kg/m².  Wt Readings from Last 1 Encounters:   03/05/25 50 kg (110 lb 3.7 oz)     IBW (Ideal Body Weight): 33.9 kg    Ideal body weight: 41.7 kg (91 lb 15.6 oz)  Adjusted ideal body weight: 45 kg (99 lb 4.4 oz)

## 2025-03-05 NOTE — ASSESSMENT & PLAN NOTE
Pt influenza A positive in the ER.  From nursing facility where they have an influenza outbreak  Started on Tamiflu, continue for 5 days  Tamiflu dose renally adjusted, continue to adjust as renal function improves

## 2025-03-05 NOTE — ASSESSMENT & PLAN NOTE
The setting of UTI, JOHNY, and septic shock  Serum bicarb 12, anion gap of 16, lactic acid 1.1  Fluid resuscitating  Trend BMP and renal function  Started on empiric antibiotics, see plan above under septic shock

## 2025-03-05 NOTE — ASSESSMENT & PLAN NOTE
Patient presented from nursing facility with reports of fever and hypotension requiring subcutaneous IV fluids.  SIRS criteria: Tachycardia, tachypnea, fever  Found to be influenza A positive and UA suggestive of UTI  CT chest abdomen pelvis showing left lower lobe consolidation  In the ER patient was hypotensive and had transient response to IV fluids  He was started on Levophed for blood pressure support  Cultures were obtained and he was started on Tamiflu, cefepime, vancomycin  Admit to ICU  Continue Levophed for MAP goal greater than 65  Continue fluid resuscitation  Continue Tamiflu, cefepime, vancomycin  Follow-up blood cultures, urine culture, MRSA, Legionella, strep

## 2025-03-05 NOTE — SEPSIS NOTE
"  Sepsis Note   Trevor Waldrop 60 y.o. male MRN: [id number]  Unit/Bed#: ED 02 Encounter: [id number]       Initial Sepsis Screening       Row Name 03/05/25 0537                Is the patient's history suggestive of a new or worsening infection? Yes (Proceed)  -EB        Suspected source of infection suspect infection, source unknown  -EB        Indicate SIRS criteria Tachycardia > 90 bpm;Hyperthemia > 38.3C (100.9F) OR Hypothermia <36C (96.8F)  -EB        Are two or more of the above signs & symptoms of infection both present and new to the patient? Yes (Proceed)  -EB        Assess for evidence of organ dysfunction: Are any of the below criteria present within 6 hours of suspected infection and SIRS criteria that are NOT considered to be chronic conditions? Creatinine > 2.0 AND > 0.5 above baseline  -EB        Date of presentation of severe sepsis 03/05/25  -EB        Time of presentation of severe sepsis 0537  -EB        Sepsis Note: Click \"NEXT\" below (NOT \"close\") to generate sepsis note based on above information. YES (proceed by clicking \"NEXT\")  -EB                  User Key  (r) = Recorded By, (t) = Taken By, (c) = Cosigned By      Initials Name Provider Type    EB Nury John DO Physician                        Body mass index is 27.59 kg/m².  Wt Readings from Last 1 Encounters:   03/05/25 50 kg (110 lb 3.7 oz)     IBW (Ideal Body Weight): 33.9 kg    Ideal body weight: 41.7 kg (91 lb 15.6 oz)  Adjusted ideal body weight: 45 kg (99 lb 4.4 oz)    "

## 2025-03-05 NOTE — ASSESSMENT & PLAN NOTE
Patient presented with creatinine of 6.36, prior creatinines in 2022 were 1.1-1.4  In the setting of UTI and shock  Anderson placed in ER with noted hematuria  CT showing severe bilateral hydronephroureterosis extending through distended bladder.  No calculi or perinephritic collection seen  Patient received 3 L IV fluid in the ER  Continue to fluid resuscitate as needed  Trend I/O status and renal indices closely  Check renal ultrasound  Consider nephrology consult if renal function does not improve with fluid resuscitation and UTI treatment

## 2025-03-06 PROBLEM — E43 SEVERE PROTEIN-CALORIE MALNUTRITION (HCC): Status: ACTIVE | Noted: 2025-03-06

## 2025-03-06 LAB
ALBUMIN SERPL BCG-MCNC: 2.9 G/DL (ref 3.5–5)
ALP SERPL-CCNC: 50 U/L (ref 34–104)
ALT SERPL W P-5'-P-CCNC: 17 U/L (ref 7–52)
ANION GAP SERPL CALCULATED.3IONS-SCNC: 14 MMOL/L (ref 4–13)
ANION GAP SERPL CALCULATED.3IONS-SCNC: 14 MMOL/L (ref 4–13)
AST SERPL W P-5'-P-CCNC: 28 U/L (ref 13–39)
BILIRUB SERPL-MCNC: 0.38 MG/DL (ref 0.2–1)
BUN SERPL-MCNC: 73 MG/DL (ref 5–25)
BUN SERPL-MCNC: 86 MG/DL (ref 5–25)
CALCIUM ALBUM COR SERPL-MCNC: 9.2 MG/DL (ref 8.3–10.1)
CALCIUM SERPL-MCNC: 8.3 MG/DL (ref 8.4–10.2)
CALCIUM SERPL-MCNC: 8.3 MG/DL (ref 8.4–10.2)
CHLORIDE SERPL-SCNC: 110 MMOL/L (ref 96–108)
CHLORIDE SERPL-SCNC: 114 MMOL/L (ref 96–108)
CO2 SERPL-SCNC: 13 MMOL/L (ref 21–32)
CO2 SERPL-SCNC: 15 MMOL/L (ref 21–32)
CREAT SERPL-MCNC: 3.03 MG/DL (ref 0.6–1.3)
CREAT SERPL-MCNC: 3.68 MG/DL (ref 0.6–1.3)
ERYTHROCYTE [DISTWIDTH] IN BLOOD BY AUTOMATED COUNT: 12.6 % (ref 11.6–15.1)
GFR SERPL CREATININE-BSD FRML MDRD: 16 ML/MIN/1.73SQ M
GFR SERPL CREATININE-BSD FRML MDRD: 21 ML/MIN/1.73SQ M
GLUCOSE SERPL-MCNC: 119 MG/DL (ref 65–140)
GLUCOSE SERPL-MCNC: 92 MG/DL (ref 65–140)
HCT VFR BLD AUTO: 23.8 % (ref 36.5–49.3)
HGB BLD-MCNC: 7.7 G/DL (ref 12–17)
MAGNESIUM SERPL-MCNC: 2.3 MG/DL (ref 1.9–2.7)
MCH RBC QN AUTO: 31.4 PG (ref 26.8–34.3)
MCHC RBC AUTO-ENTMCNC: 32.4 G/DL (ref 31.4–37.4)
MCV RBC AUTO: 97 FL (ref 82–98)
MRSA NOSE QL CULT: NORMAL
PHOSPHATE SERPL-MCNC: 4.2 MG/DL (ref 2.3–4.1)
PLATELET # BLD AUTO: 161 THOUSANDS/UL (ref 149–390)
PMV BLD AUTO: 9.2 FL (ref 8.9–12.7)
POTASSIUM SERPL-SCNC: 3.2 MMOL/L (ref 3.5–5.3)
POTASSIUM SERPL-SCNC: 3.7 MMOL/L (ref 3.5–5.3)
PROT SERPL-MCNC: 6.4 G/DL (ref 6.4–8.4)
RBC # BLD AUTO: 2.45 MILLION/UL (ref 3.88–5.62)
SODIUM SERPL-SCNC: 139 MMOL/L (ref 135–147)
SODIUM SERPL-SCNC: 141 MMOL/L (ref 135–147)
VANCOMYCIN SERPL-MCNC: 12.5 UG/ML (ref 10–20)
WBC # BLD AUTO: 6.39 THOUSAND/UL (ref 4.31–10.16)

## 2025-03-06 PROCEDURE — 94640 AIRWAY INHALATION TREATMENT: CPT

## 2025-03-06 PROCEDURE — 80202 ASSAY OF VANCOMYCIN: CPT | Performed by: INTERNAL MEDICINE

## 2025-03-06 PROCEDURE — 80053 COMPREHEN METABOLIC PANEL: CPT | Performed by: NURSE PRACTITIONER

## 2025-03-06 PROCEDURE — 97167 OT EVAL HIGH COMPLEX 60 MIN: CPT

## 2025-03-06 PROCEDURE — 99232 SBSQ HOSP IP/OBS MODERATE 35: CPT | Performed by: INTERNAL MEDICINE

## 2025-03-06 PROCEDURE — 80048 BASIC METABOLIC PNL TOTAL CA: CPT | Performed by: NURSE PRACTITIONER

## 2025-03-06 PROCEDURE — 99255 IP/OBS CONSLTJ NEW/EST HI 80: CPT | Performed by: INTERNAL MEDICINE

## 2025-03-06 PROCEDURE — 83735 ASSAY OF MAGNESIUM: CPT | Performed by: NURSE PRACTITIONER

## 2025-03-06 PROCEDURE — 85027 COMPLETE CBC AUTOMATED: CPT | Performed by: NURSE PRACTITIONER

## 2025-03-06 PROCEDURE — 94760 N-INVAS EAR/PLS OXIMETRY 1: CPT

## 2025-03-06 PROCEDURE — 97163 PT EVAL HIGH COMPLEX 45 MIN: CPT

## 2025-03-06 PROCEDURE — 84100 ASSAY OF PHOSPHORUS: CPT | Performed by: NURSE PRACTITIONER

## 2025-03-06 RX ORDER — OSELTAMIVIR PHOSPHATE 30 MG/1
30 CAPSULE ORAL EVERY 24 HOURS
Status: DISPENSED | OUTPATIENT
Start: 2025-03-06 | End: 2025-03-10

## 2025-03-06 RX ORDER — LEVETIRACETAM 500 MG/1
500 TABLET ORAL EVERY 24 HOURS
Status: DISCONTINUED | OUTPATIENT
Start: 2025-03-06 | End: 2025-03-09

## 2025-03-06 RX ORDER — MIDODRINE HYDROCHLORIDE 5 MG/1
5 TABLET ORAL ONCE
Status: COMPLETED | OUTPATIENT
Start: 2025-03-06 | End: 2025-03-06

## 2025-03-06 RX ORDER — MIDODRINE HYDROCHLORIDE 5 MG/1
5 TABLET ORAL
Status: DISCONTINUED | OUTPATIENT
Start: 2025-03-06 | End: 2025-03-06

## 2025-03-06 RX ORDER — MIDODRINE HYDROCHLORIDE 5 MG/1
10 TABLET ORAL
Status: DISCONTINUED | OUTPATIENT
Start: 2025-03-06 | End: 2025-03-14 | Stop reason: HOSPADM

## 2025-03-06 RX ORDER — VANCOMYCIN HYDROCHLORIDE 500 MG/100ML
500 INJECTION, SOLUTION INTRAVENOUS DAILY PRN
Status: DISCONTINUED | OUTPATIENT
Start: 2025-03-06 | End: 2025-03-07

## 2025-03-06 RX ORDER — POTASSIUM CHLORIDE 1500 MG/1
40 TABLET, EXTENDED RELEASE ORAL ONCE
Status: COMPLETED | OUTPATIENT
Start: 2025-03-06 | End: 2025-03-06

## 2025-03-06 RX ORDER — LEVALBUTEROL INHALATION SOLUTION 1.25 MG/3ML
1.25 SOLUTION RESPIRATORY (INHALATION) EVERY 6 HOURS PRN
Status: DISCONTINUED | OUTPATIENT
Start: 2025-03-06 | End: 2025-03-14 | Stop reason: HOSPADM

## 2025-03-06 RX ORDER — ASPIRIN 81 MG/1
81 TABLET, CHEWABLE ORAL DAILY
Status: DISCONTINUED | OUTPATIENT
Start: 2025-03-06 | End: 2025-03-14 | Stop reason: HOSPADM

## 2025-03-06 RX ORDER — LANOLIN ALCOHOL/MO/W.PET/CERES
100 CREAM (GRAM) TOPICAL DAILY
Status: DISCONTINUED | OUTPATIENT
Start: 2025-03-06 | End: 2025-03-14 | Stop reason: HOSPADM

## 2025-03-06 RX ORDER — OSELTAMIVIR PHOSPHATE 30 MG/1
30 CAPSULE ORAL EVERY 24 HOURS
Status: DISCONTINUED | OUTPATIENT
Start: 2025-03-07 | End: 2025-03-06

## 2025-03-06 RX ORDER — ZINC SULFATE 50(220)MG
220 CAPSULE ORAL DAILY
Status: DISCONTINUED | OUTPATIENT
Start: 2025-03-06 | End: 2025-03-14 | Stop reason: HOSPADM

## 2025-03-06 RX ORDER — ALBUMIN HUMAN 50 G/1000ML
12.5 SOLUTION INTRAVENOUS ONCE
Status: COMPLETED | OUTPATIENT
Start: 2025-03-06 | End: 2025-03-06

## 2025-03-06 RX ORDER — POTASSIUM CHLORIDE 1500 MG/1
20 TABLET, EXTENDED RELEASE ORAL ONCE
Status: COMPLETED | OUTPATIENT
Start: 2025-03-06 | End: 2025-03-06

## 2025-03-06 RX ORDER — SODIUM CHLORIDE, SODIUM GLUCONATE, SODIUM ACETATE, POTASSIUM CHLORIDE, MAGNESIUM CHLORIDE, SODIUM PHOSPHATE, DIBASIC, AND POTASSIUM PHOSPHATE .53; .5; .37; .037; .03; .012; .00082 G/100ML; G/100ML; G/100ML; G/100ML; G/100ML; G/100ML; G/100ML
75 INJECTION, SOLUTION INTRAVENOUS CONTINUOUS
Status: DISCONTINUED | OUTPATIENT
Start: 2025-03-06 | End: 2025-03-06

## 2025-03-06 RX ORDER — SODIUM CHLORIDE, SODIUM GLUCONATE, SODIUM ACETATE, POTASSIUM CHLORIDE, MAGNESIUM CHLORIDE, SODIUM PHOSPHATE, DIBASIC, AND POTASSIUM PHOSPHATE .53; .5; .37; .037; .03; .012; .00082 G/100ML; G/100ML; G/100ML; G/100ML; G/100ML; G/100ML; G/100ML
250 INJECTION, SOLUTION INTRAVENOUS ONCE
Status: COMPLETED | OUTPATIENT
Start: 2025-03-06 | End: 2025-03-06

## 2025-03-06 RX ORDER — FERROUS SULFATE 325(65) MG
325 TABLET ORAL
Status: DISCONTINUED | OUTPATIENT
Start: 2025-03-07 | End: 2025-03-14 | Stop reason: HOSPADM

## 2025-03-06 RX ORDER — VANCOMYCIN HYDROCHLORIDE 500 MG/100ML
12.5 INJECTION, SOLUTION INTRAVENOUS ONCE
Status: COMPLETED | OUTPATIENT
Start: 2025-03-06 | End: 2025-03-06

## 2025-03-06 RX ORDER — TAMSULOSIN HYDROCHLORIDE 0.4 MG/1
0.4 CAPSULE ORAL
Status: DISCONTINUED | OUTPATIENT
Start: 2025-03-06 | End: 2025-03-14 | Stop reason: HOSPADM

## 2025-03-06 RX ORDER — CEFEPIME HYDROCHLORIDE 1 G/50ML
1000 INJECTION, SOLUTION INTRAVENOUS EVERY 12 HOURS
Status: DISCONTINUED | OUTPATIENT
Start: 2025-03-06 | End: 2025-03-07

## 2025-03-06 RX ORDER — AMOXICILLIN 250 MG
2 CAPSULE ORAL DAILY
Status: DISCONTINUED | OUTPATIENT
Start: 2025-03-06 | End: 2025-03-14 | Stop reason: HOSPADM

## 2025-03-06 RX ADMIN — CEFEPIME HYDROCHLORIDE 1000 MG: 1 INJECTION, SOLUTION INTRAVENOUS at 20:38

## 2025-03-06 RX ADMIN — SODIUM BICARBONATE 125 ML/HR: 84 INJECTION, SOLUTION INTRAVENOUS at 20:38

## 2025-03-06 RX ADMIN — ACETAMINOPHEN 650 MG: 325 TABLET, FILM COATED ORAL at 00:43

## 2025-03-06 RX ADMIN — ASPIRIN 81 MG CHEWABLE TABLET 81 MG: 81 TABLET CHEWABLE at 14:18

## 2025-03-06 RX ADMIN — SENNOSIDES AND DOCUSATE SODIUM 2 TABLET: 50; 8.6 TABLET ORAL at 14:18

## 2025-03-06 RX ADMIN — MIDODRINE HYDROCHLORIDE 10 MG: 5 TABLET ORAL at 11:10

## 2025-03-06 RX ADMIN — HEPARIN SODIUM 5000 UNITS: 5000 INJECTION INTRAVENOUS; SUBCUTANEOUS at 05:26

## 2025-03-06 RX ADMIN — LEVALBUTEROL HYDROCHLORIDE 1.25 MG: 1.25 SOLUTION RESPIRATORY (INHALATION) at 13:42

## 2025-03-06 RX ADMIN — Medication 100 MG: at 14:18

## 2025-03-06 RX ADMIN — LAMOTRIGINE 200 MG: 100 TABLET ORAL at 19:32

## 2025-03-06 RX ADMIN — Medication 2000 UNITS: at 14:18

## 2025-03-06 RX ADMIN — SODIUM CHLORIDE, SODIUM GLUCONATE, SODIUM ACETATE, POTASSIUM CHLORIDE, MAGNESIUM CHLORIDE, SODIUM PHOSPHATE, DIBASIC, AND POTASSIUM PHOSPHATE 75 ML/HR: .53; .5; .37; .037; .03; .012; .00082 INJECTION, SOLUTION INTRAVENOUS at 05:20

## 2025-03-06 RX ADMIN — ACETAMINOPHEN 650 MG: 325 TABLET, FILM COATED ORAL at 11:49

## 2025-03-06 RX ADMIN — MIRTAZAPINE 7.5 MG: 15 TABLET, FILM COATED ORAL at 20:44

## 2025-03-06 RX ADMIN — HEPARIN SODIUM 5000 UNITS: 5000 INJECTION INTRAVENOUS; SUBCUTANEOUS at 20:44

## 2025-03-06 RX ADMIN — Medication 6 MG: at 20:44

## 2025-03-06 RX ADMIN — CHLORHEXIDINE GLUCONATE 15 ML: 1.2 RINSE ORAL at 20:44

## 2025-03-06 RX ADMIN — MIDODRINE HYDROCHLORIDE 5 MG: 5 TABLET ORAL at 06:20

## 2025-03-06 RX ADMIN — LEVALBUTEROL HYDROCHLORIDE 1.25 MG: 1.25 SOLUTION RESPIRATORY (INHALATION) at 09:18

## 2025-03-06 RX ADMIN — MIDODRINE HYDROCHLORIDE 10 MG: 5 TABLET ORAL at 17:11

## 2025-03-06 RX ADMIN — ZINC SULFATE 220 MG (50 MG) CAPSULE 220 MG: CAPSULE at 14:18

## 2025-03-06 RX ADMIN — PANTOPRAZOLE SODIUM 20 MG: 20 TABLET, DELAYED RELEASE ORAL at 05:32

## 2025-03-06 RX ADMIN — LEVETIRACETAM 500 MG: 500 TABLET, FILM COATED ORAL at 10:28

## 2025-03-06 RX ADMIN — POTASSIUM CHLORIDE 40 MEQ: 1500 TABLET, EXTENDED RELEASE ORAL at 17:11

## 2025-03-06 RX ADMIN — TAMSULOSIN HYDROCHLORIDE 0.4 MG: 0.4 CAPSULE ORAL at 17:11

## 2025-03-06 RX ADMIN — ALBUMIN (HUMAN) 12.5 G: 12.5 INJECTION, SOLUTION INTRAVENOUS at 06:17

## 2025-03-06 RX ADMIN — IPRATROPIUM BROMIDE 0.5 MG: 0.5 SOLUTION RESPIRATORY (INHALATION) at 09:18

## 2025-03-06 RX ADMIN — VANCOMYCIN HYDROCHLORIDE 500 MG: 500 INJECTION, SOLUTION INTRAVENOUS at 08:19

## 2025-03-06 RX ADMIN — OSELTAMIVIR PHOSPHATE 30 MG: 30 CAPSULE ORAL at 08:55

## 2025-03-06 RX ADMIN — SODIUM CHLORIDE, SODIUM GLUCONATE, SODIUM ACETATE, POTASSIUM CHLORIDE, MAGNESIUM CHLORIDE, SODIUM PHOSPHATE, DIBASIC, AND POTASSIUM PHOSPHATE 75 ML/HR: .53; .5; .37; .037; .03; .012; .00082 INJECTION, SOLUTION INTRAVENOUS at 02:16

## 2025-03-06 RX ADMIN — POTASSIUM CHLORIDE 20 MEQ: 1500 TABLET, EXTENDED RELEASE ORAL at 14:18

## 2025-03-06 RX ADMIN — HEPARIN SODIUM 5000 UNITS: 5000 INJECTION INTRAVENOUS; SUBCUTANEOUS at 14:18

## 2025-03-06 RX ADMIN — IPRATROPIUM BROMIDE 0.5 MG: 0.5 SOLUTION RESPIRATORY (INHALATION) at 13:42

## 2025-03-06 RX ADMIN — B-COMPLEX W/ C & FOLIC ACID TAB 1 TABLET: TAB at 14:18

## 2025-03-06 RX ADMIN — CEFEPIME HYDROCHLORIDE 1000 MG: 1 INJECTION, SOLUTION INTRAVENOUS at 05:26

## 2025-03-06 RX ADMIN — SODIUM CHLORIDE, SODIUM GLUCONATE, SODIUM ACETATE, POTASSIUM CHLORIDE, MAGNESIUM CHLORIDE, SODIUM PHOSPHATE, DIBASIC, AND POTASSIUM PHOSPHATE 250 ML: .53; .5; .37; .037; .03; .012; .00082 INJECTION, SOLUTION INTRAVENOUS at 03:10

## 2025-03-06 RX ADMIN — SODIUM BICARBONATE 125 ML/HR: 84 INJECTION, SOLUTION INTRAVENOUS at 10:30

## 2025-03-06 RX ADMIN — MIDODRINE HYDROCHLORIDE 5 MG: 5 TABLET ORAL at 05:48

## 2025-03-06 RX ADMIN — CHLORHEXIDINE GLUCONATE 15 ML: 1.2 RINSE ORAL at 08:21

## 2025-03-06 NOTE — ASSESSMENT & PLAN NOTE
Patient presented with creatinine of 6.36, prior creatinines in 2022 were 1.1-1.4  In the setting of UTI and shock  Anderson placed in ER with noted hematuria  CT showing severe bilateral hydronephroureterosis extending through distended bladder.  No calculi or perinephritic collection seen  Patient received 3 L IV fluid in the ER    Plan  Trend I/O status and renal indices closely  Check renal ultrasound  Consider nephrology consult if renal function does not improve with fluid resuscitation and UTI treatment

## 2025-03-06 NOTE — ASSESSMENT & PLAN NOTE
UA on admission suggestive of UTI  Anderson placed in ER and noted to have hematuria   Started on cefepime and vancomycin in the ER    Plan  Continue close monitoring of I/O status and renal function daily  Continue antibiotics outlined above  Follow-up cultures

## 2025-03-06 NOTE — ASSESSMENT & PLAN NOTE
Metabolic acidosis positive anion gap most compatible with acute kidney injury given normal lactic acid  Bicarbonate infusion  Bicarbonate improving to 15 monitor closely

## 2025-03-06 NOTE — UTILIZATION REVIEW
Initial Clinical Review    Admission: Date/Time/Statement:   Admission Orders (From admission, onward)       Ordered        03/05/25 0902  INPATIENT ADMISSION  Once                          Orders Placed This Encounter   Procedures    INPATIENT ADMISSION     Standing Status:   Standing     Number of Occurrences:   1     Level of Care:   Critical Care [15]     Estimated length of stay:   More than 2 Midnights     Certification:   I certify that inpatient services are medically necessary for this patient for a duration of greater than two midnights. See H&P and MD Progress Notes for additional information about the patient's course of treatment.     ED Arrival Information       Expected   -    Arrival   3/5/2025 04:27    Acuity   Emergent              Means of arrival   Ambulance    Escorted by   [facility]    Service   Critical Care/ICU    Admission type   Emergency              Arrival complaint   Fever             Chief Complaint   Patient presents with    Fever     Patient sent from [facility]. Flu s/sx x 2 days with fever.  APAP at 0115 and subcu fluids given at [facility]       Initial Presentation: 60 y.o. male  to ED via EMS from nursing facility.    Admitted to inpatient with Dx:  Septic shock/Urinary tract infection/Suspected bacterial pneumonia/Infection with influenza A/Acute renal failure/Combined anion gap but not anion gap metabolic acidosis/Dementia/history of TBI.  Presented to ED with fever starting 2 days prior to arrival.  Receiving subcutaneous fluids for hydration.   PMHx: TBI, seizures, GERD, anemia, hyponatremia, bipolar, bilateral lower extremity amputations.  On exam: febrile.  Tachycardia.  Lungs rhonchi.  Diffuse abdominal tenderness.  Bilateral lower extremities with old healed BKAs. 2 episodes of hypotension in ED.    Bun 109. Creatinine 6.38 with prior of 1.1 - 1.4 in 2022.    procal 14.58.  influenza A positive.  .    Imaging shows left lower lobe consolidation . ED  treatment:  IVF bolus total about 3 liters, IV acetaminophen.  Hypotension persisted and started on Levophed.  Given antibiotics and started on Tamiflu.    Plan includes to admit to ICU: Continue Levophed for MAP goal greater than 65, IVF resuscitation.  Continue Tamiflu, cefepime, vancomycin.  Follow-up blood cultures, urine culture, MRSA, Legionella, strep.  Check renal US and trend BMP    Date: 3/6/25   Day 2:  febrile.  No longer requiring vasopressors - was on Levophed for about 5 hours.  Given albumin today.     On exam:  pale.  Tachycardia.  Diminished breath sounds due to poor effort.   L BKA and R AKA.  Calm.   Urine culture Providencia rettgeri.   Co2 - 2=13.  Anion gap 14.   Bun 86. Creatinine 3.68.   wbc 6.39.  H&H 7.7/23.8.  Plan:  SDU level 1.   continue to monitor respiratory status,  continue antibiotics, Tamiflu.   On Bicarb drip, trend BMP.      ED Treatment-Medication Administration from 03/05/2025 0427 to 03/05/2025 0956         Date/Time Order Dose Route Action     03/05/2025 0521 sodium chloride 0.9 % bolus 1,000 mL 1,000 mL Intravenous New Bag     03/05/2025 0557 acetaminophen (Ofirmev) injection 1,000 mg 1,000 mg Intravenous New Bag     03/05/2025 0528 cefepime (MAXIPIME) IVPB (premix in dextrose) 1,000 mg 50 mL 1,000 mg Intravenous New Bag     03/05/2025 0640 vancomycin (VANCOCIN) IVPB (premix in dextrose) 1,000 mg 200 mL 1,000 mg Intravenous New Bag     03/05/2025 0627 sodium chloride 0.9 % bolus 1,000 mL 1,000 mL Intravenous New Bag     03/05/2025 0643 oseltamivir (TAMIFLU) capsule 30 mg 30 mg Oral Given     03/05/2025 0915 norepinephrine (LEVOPHED) 4 mg (STANDARD CONCENTRATION) IV in sodium chloride 0.9% 250 mL 5 mcg/min Intravenous New Bag     03/05/2025 0933 lactated ringers bolus 1,000 mL 1,000 mL Intravenous New Bag            Scheduled Medications:  cefepime, 1,000 mg, Intravenous, Q12H  chlorhexidine, 15 mL, Mouth/Throat, Q12H DANIELLE  heparin (porcine), 5,000 Units, Subcutaneous, Q8H  DANIELLE  ipratropium, 0.5 mg, Nebulization, TID  lamoTRIgine, 200 mg, Oral, BID  levalbuterol, 1.25 mg, Nebulization, TID  levETIRAcetam, 500 mg, Oral, Q24H  melatonin, 6 mg, Oral, HS  midodrine, 10 mg, Oral, TID AC  mirtazapine, 7.5 mg, Oral, HS  oseltamivir, 30 mg, Oral, Q24H  pantoprazole, 20 mg, Oral, Early Morning      albumin human (FLEXBUMIN) 5 % injection 12.5 g  Dose: 12.5 g  Freq: Once Route: IV  Last Dose: Stopped (03/06/25 0639)  Start: 03/06/25 0630 End: 03/06/25 0639  albumin human (FLEXBUMIN) 5 % injection 12.5 g  Dose: 12.5 g  Freq: Once Route: IV  Last Dose: Stopped (03/06/25 0639)  Start: 03/06/25 0630 End: 03/06/25 0639    cefepime (MAXIPIME) IVPB (premix in dextrose) 1,000 mg 50 mL  Dose: 1,000 mg  Freq: Every 24 hours Route: IV  Last Dose: Stopped (03/06/25 0559)  Start: 03/06/25 0530 End: 03/06/25 0814    levETIRAcetam (KEPPRA) tablet 1,000 mg  Dose: 1,000 mg  Freq: Every 12 hours scheduled Route: PO  Start: 03/05/25 1045 End: 03/06/25 0945  midodrine (PROAMATINE) tablet 5 mg  Dose: 5 mg  Freq: 2 times daily before meals Route: PO  Start: 03/06/25 0545 End: 03/06/25 0616    multi-electrolyte (Plasmalyte-A/Isolyte-S PH 7.4/Normosol-R) IV solution 500 mL  Dose: 500 mL  Freq: Once Route: IV  Last Dose: Stopped (03/05/25 2245)  Start: 03/05/25 2145 End: 03/05/25 2245    Continuous IV Infusions:  multi-electrolyte, 75 mL/hr, Intravenous, Continuous 3/6 - 0216 then changed to below at 1030  sodium bicarbonate 150 mEq in dextrose 5 % 1,000 mL infusion, 125 mL/hr, Intravenous, Continuous    PRN Meds: x 1 3/5.  X 2 3/6  calcium carbonate, 1,000 mg, Oral, BID PRN  vancomycin, 500 mg, Intravenous, Daily PRN      ED Triage Vitals   Temperature Pulse Respirations Blood Pressure SpO2 Pain Score   03/05/25 0432 03/05/25 0432 03/05/25 0545 03/05/25 0432 03/05/25 0432 03/05/25 0432   (!) 101.5 °F (38.6 °C) (!) 126 (!) 24 94/56 94 % No Pain     Weight (last 2 days)       Date/Time Weight    03/06/25 0532 49.1  (108.25)    03/05/25 1038 49.9 (110.01)    03/05/25 1000 49.9 (110.01)    03/05/25 0432 50 (110.23)            Vital Signs (last 3 days)       Date/Time Temp Pulse Resp BP MAP (mmHg) SpO2 O2 Device Patient Position - Orthostatic VS Sandoval Coma Scale Score Pain    03/06/25 1149 -- -- -- -- -- -- -- -- -- Med Not Given for Pain - for MAR use only    03/06/25 1130 100.4 °F (38 °C) 114 35 122/68 89 87 % -- -- -- --    03/06/25 1100 100 °F (37.8 °C) 113 17 122/63 86 98 % -- -- -- --    03/06/25 1056 -- -- -- 127/65 86 -- None (Room air) Lying -- --    03/06/25 1030 99.9 °F (37.7 °C) 117 26 113/65 80 98 % -- -- -- --    03/06/25 1000 99.5 °F (37.5 °C) 120 23 128/77 95 98 % -- -- -- --    03/06/25 0930 99.3 °F (37.4 °C) 114 22 109/63 80 98 % -- -- -- --    03/06/25 0900 99.5 °F (37.5 °C) 113 23 115/64 83 97 % -- -- -- --    03/06/25 0830 99.3 °F (37.4 °C) 112 29 102/61 75 96 % -- -- -- --    03/06/25 0800 99 °F (37.2 °C) 109 25 93/57 68 95 % -- -- 15 No Pain    03/06/25 0730 99.1 °F (37.3 °C) 94 21 96/53 69 96 % -- -- -- --    03/06/25 0700 99.1 °F (37.3 °C) 93 25 91/54 68 96 % None (Room air) Lying -- --    03/06/25 0600 99.5 °F (37.5 °C) 95 20 80/50 60 99 % -- -- -- --    03/06/25 0533 98.8 °F (37.1 °C) 98 28 84/50 62 99 % -- -- -- --    03/06/25 0510 99.7 °F (37.6 °C) 101 24 89/51 65 99 % -- -- -- --    03/06/25 0400 99.9 °F (37.7 °C) 105 24 93/50 66 97 % -- -- -- --    03/06/25 0351 -- -- -- -- -- -- -- -- 14 --    03/06/25 0300 100.2 °F (37.9 °C) 104 28 85/50 62 100 % None (Room air) Lying -- No Pain    03/06/25 0212 100.6 °F (38.1 °C) 107 27 82/47 59 99 % -- -- -- --    03/06/25 0200 100.8 °F (38.2 °C) 109 20 83/46 58 99 % -- -- -- --    03/06/25 0100 100.9 °F (38.3 °C) 114 22 97/55 73 100 % -- -- -- --    03/06/25 0043 -- -- -- -- -- -- -- -- -- Med Not Given for Pain - for MAR use only    03/06/25 0042 100.9 °F (38.3 °C) 113 24 96/53 69 99 % -- -- -- --    03/06/25 0000 -- -- -- -- -- -- -- -- 14 --    03/05/25  2300 100 °F (37.8 °C) 112 21 104/58 76 98 % None (Room air) Lying -- No Pain    03/05/25 2223 100 °F (37.8 °C) 114 22 -- -- 96 % None (Room air) Lying -- --    03/05/25 2200 100 °F (37.8 °C) 116 19 108/54 76 97 % -- -- -- --    03/05/25 2130 100 °F (37.8 °C) 116 30 89/57 68 96 % -- -- -- --    03/05/25 2100 100.2 °F (37.9 °C) 110 19 85/50 63 96 % -- -- -- --    03/05/25 2011 100.4 °F (38 °C) 116 24 92/50 67 96 % -- -- -- --    03/05/25 1925 -- -- -- -- -- -- None (Room air) -- -- --    03/05/25 1916 101.1 °F (38.4 °C) 113 22 105/59 77 96 % None (Room air) Lying -- No Pain    03/05/25 1908 -- -- -- -- -- -- -- -- 14 --    03/05/25 1808 -- -- -- -- -- -- -- -- -- Med Not Given for Pain - for MAR use only    03/05/25 1800 100.9 °F (38.3 °C) 115 17 114/63 83 97 % -- -- -- --    03/05/25 1715 100 °F (37.8 °C) 111 18 113/60 80 98 % -- -- -- --    03/05/25 1700 -- -- -- -- -- 98 % -- -- -- --    03/05/25 1600 99.3 °F (37.4 °C) 101 26 101/64 78 98 % None (Room air) Lying 14 No Pain    03/05/25 1500 98.6 °F (37 °C) 99 20 97/60 73 99 % -- -- -- --    03/05/25 1415 98.4 °F (36.9 °C) 95 23 104/59 76 99 % -- -- -- --    03/05/25 1400 98.2 °F (36.8 °C) 94 23 103/58 75 99 % -- -- -- --    03/05/25 1300 97.9 °F (36.6 °C) 95 22 110/64 82 98 % -- -- -- --    03/05/25 1245 97.7 °F (36.5 °C) 96 30 112/67 85 99 % -- -- -- --    03/05/25 1230 97.7 °F (36.5 °C) 97 29 112/64 83 98 % -- -- -- --    03/05/25 1215 97.5 °F (36.4 °C) 97 20 110/64 82 98 % -- -- -- --    03/05/25 1200 97.3 °F (36.3 °C) 94 17 107/60 79 97 % -- Lying -- --    03/05/25 1145 97.3 °F (36.3 °C) 91 25 113/65 83 98 % -- -- -- --    03/05/25 1130 97.3 °F (36.3 °C) 84 15 100/58 74 95 % -- -- -- --    03/05/25 1115 97.5 °F (36.4 °C) 85 15 97/59 73 95 % -- -- -- --    03/05/25 1100 98.1 °F (36.7 °C) 84 15 86/52 64 96 % -- -- -- --    03/05/25 1045 98.2 °F (36.8 °C) 86 16 86/51 64 97 % -- -- -- --    03/05/25 1030 98.6 °F (37 °C) 87 16 82/51 62 95 % -- -- -- --    03/05/25  1015 98.8 °F (37.1 °C) 95 25 100/56 73 95 % -- -- -- --    03/05/25 1000 98.1 °F (36.7 °C) 103 32 92/50 63 93 % -- -- 14 No Pain    03/05/25 0930 99 °F (37.2 °C) 97 22 101/56 72 95 % None (Room air) Lying -- No Pain    03/05/25 0915 -- 102 -- 90/52 65 -- -- -- -- --    03/05/25 0900 99.1 °F (37.3 °C) 100 21 95/50 67 94 % None (Room air) Lying -- No Pain    03/05/25 0845 99.1 °F (37.3 °C) 101 27 91/61 68 94 % None (Room air) Lying -- No Pain    03/05/25 0830 -- 94 23 94/62 -- 94 % None (Room air) -- -- No Pain    03/05/25 0815 99.1 °F (37.3 °C) 101 22 80/52 62 94 % None (Room air) Lying -- No Pain    03/05/25 0800 99 °F (37.2 °C) 101 22 85/43 60 94 % None (Room air) Lying -- No Pain    03/05/25 0745 99 °F (37.2 °C) 106 21 137/83 104 93 % None (Room air) Lying -- No Pain    03/05/25 0730 99 °F (37.2 °C) 104 22 89/50 65 94 % None (Room air) Lying -- No Pain    03/05/25 0715 99 °F (37.2 °C) 103 21 97/69 79 93 % None (Room air) Lying -- No Pain    03/05/25 0700 98.8 °F (37.1 °C) 105 19 92/54 67 94 % None (Room air) Lying -- No Pain    03/05/25 0645 99.1 °F (37.3 °C) 105 24 104/63 77 95 % -- -- -- --    03/05/25 0630 99.1 °F (37.3 °C) 104 24 96/50 67 97 % -- -- -- --    03/05/25 0615 -- 105 22 90/53 66 97 % -- -- -- --    03/05/25 0600 -- 109 24 86/53 64 98 % -- -- -- --    03/05/25 0545 -- 117 24 77/55 62 94 % -- -- -- --    03/05/25 0432 101.5 °F (38.6 °C) 126 -- 94/56 -- 94 % -- -- -- No Pain            Pertinent Labs/Diagnostic Test Results:   Radiology:  CT chest abdomen pelvis wo contrast   Final Interpretation by Domo Holm MD (03/05 0710)      CT chest:      Left lower lobe airspace consolidation and bilateral multi lobar tree-in-bud infiltrates may represent pneumonia and/or aspiration.      Bilateral lower lobe segmental and subsegmental endobronchial opacification, left greater than right may represent mucous plugging and/or aspiration.      Stable right upper lobe cystic lesion with mural  nodularity and left upper lobe spiculated lesion unchanged since November 2020. Noncontrast chest CT follow-up in 12 months is advised.      Additional chronic findings and negatives as above.      CT abdomen and pelvis:      Severe bilateral hydronephroureterosis extending through the distended bladder. No radiopaque urinary tract calculi or perinephric collection. Findings suggestive of acute on chronic urinary retention. Correlate with UA to exclude superimposed cystitis.      Cholelithiasis.      Additional chronic findings and negatives as above.      The study was marked in EPIC for immediate notification.            Workstation performed: [id number]         XR chest portable   Final Interpretation by Chiki Cruz MD (03/05 1017)      Bilateral infiltrates left greater than right suspicious for pneumonia.      The lesions seen on the prior CT are not visible on x-ray. Please refer to the report of the follow-up CT scan obtained after this x-ray for further details            Workstation performed: [id number]           Cardiology:  ECG 12 lead   Final Result by Hubert Gupta MD (03/05 1455)   Sinus tachycardia   Otherwise normal ECG   When compared with ECG of 15-Nov-2020 08:14,   No significant change was found   Confirmed by Hubert Gupta ([id number]) on 3/5/2025 2:55:35 PM        GI:  No orders to display       Results from last 7 days   Lab Units 03/05/25  0444   SARS-COV-2  Negative     Results from last 7 days   Lab Units 03/06/25  0516 03/05/25  0444   WBC Thousand/uL 6.39 8.47   HEMOGLOBIN g/dL 7.7* 10.2*   HEMATOCRIT % 23.8* 30.5*   PLATELETS Thousands/uL 161 206   BANDS PCT %  --  5     Results from last 7 days   Lab Units 03/06/25  0516 03/05/25  0444   SODIUM mmol/L 141 135   POTASSIUM mmol/L 3.7 5.1   CHLORIDE mmol/L 114* 107   CO2 mmol/L 13* 12*   ANION GAP mmol/L 14* 16*   BUN mg/dL 86* 109*   CREATININE mg/dL 3.68* 6.38*   EGFR ml/min/1.73sq m 16 8   CALCIUM mg/dL 8.3* 9.0   MAGNESIUM  mg/dL 2.3 2.6   PHOSPHORUS mg/dL 4.2* 4.0     Results from last 7 days   Lab Units 03/06/25  0516 03/05/25  0444   AST U/L 28 36   ALT U/L 17 21   ALK PHOS U/L 50 62   TOTAL PROTEIN g/dL 6.4 7.8   ALBUMIN g/dL 2.9* 3.3*   TOTAL BILIRUBIN mg/dL 0.38 0.33     Results from last 7 days   Lab Units 03/06/25  0516 03/05/25  0444   GLUCOSE RANDOM mg/dL 92 98     Results from last 7 days   Lab Units 03/05/25  0444   CK TOTAL U/L 521*     Results from last 7 days   Lab Units 03/05/25  0631 03/05/25  0444   HS TNI 0HR ng/L  --  13   HS TNI 2HR ng/L 11  --    HSTNI D2 ng/L -2  --      Results from last 7 days   Lab Units 03/05/25  0611   PROTIME seconds 17.4*   INR  1.38*   PTT seconds 38*     Results from last 7 days   Lab Units 03/05/25  0444   PROCALCITONIN ng/ml 14.58*     Results from last 7 days   Lab Units 03/05/25  0444   LACTIC ACID mmol/L 1.1     Results from last 7 days   Lab Units 03/05/25 0444   LIPASE u/L <6*     Results from last 7 days   Lab Units 03/05/25  0628   CLARITY UA  Cloudy   COLOR UA  Brown   SPEC GRAV UA  1.020   PH UA  8.0   GLUCOSE UA mg/dl Negative   KETONES UA mg/dl Negative   BLOOD UA  Large*   PROTEIN UA mg/dl 100 (2+)*   NITRITE UA  Negative   BILIRUBIN UA  Negative   UROBILINOGEN UA (BE) mg/dl <2.0   LEUKOCYTES UA  Large*   WBC UA /hpf 30-50*   RBC UA /hpf 30-50*   BACTERIA UA /hpf Innumerable*   EPITHELIAL CELLS WET PREP /hpf None Seen     Results from last 7 days   Lab Units 03/05/25  1044 03/05/25 0444   STREP PNEUMONIAE ANTIGEN, URINE  Negative  --    LEGIONELLA URINARY ANTIGEN  Negative  --    INFLUENZA A PCR   --  Positive*   INFLUENZA B PCR   --  Negative   RSV PCR   --  Negative                             Results from last 7 days   Lab Units 03/05/25  0628 03/05/25  0515 03/05/25  0444   BLOOD CULTURE   --  No Growth at 24 hrs. No Growth at 24 hrs.   URINE CULTURE  >100,000 cfu/ml Providencia rettgeri*  --   --              Results from last 7 days   Lab Units 03/06/25  0557    VANCOMYCIN RM ug/mL 12.5       Past Medical History:   Diagnosis Date    Alcohol abuse     Anemia 6/14/2019    On iron.  Unclear if ever documented iron deficient    GERD (gastroesophageal reflux disease)     GERD with esophagitis 6/14/2019    EGD in [facility] 2019    Hx of AKA (above knee amputation) (HCC)     Right    Infectious viral hepatitis     Seizures (HCC)     Seizures (HCC)     Ulcer of esophagus      Present on Admission:   GERD with esophagitis   Bipolar disorder (HCC)   Localization-related (focal) (partial) symptomatic epilepsy and epileptic syndromes with complex partial seizures, not intractable, with status epilepticus (HCC)   Influenza A   Left lower lobe pneumonia   Acute cystitis with hematuria   Septic shock (HCC)   OJHNY (acute kidney injury) (HCC)   Metabolic acidosis      Admitting Diagnosis: Urinary retention [R33.9]  UTI (urinary tract infection) [N39.0]  Metabolic acidosis [E87.20]  Pneumonia [J18.9]  Influenza A [J10.1]  Fever [R50.9]  JOHNY (acute kidney injury) (HCC) [N17.9]  Acute cystitis with hematuria [N30.01]  Severe sepsis (HCC) [A41.9, R65.20]  Age/Sex: 60 y.o. male    Network Utilization Review Department  ATTENTION: Please call with any questions or concerns to [phone] and carefully listen to the prompts so that you are directed to the right person. All voicemails are confidential.   For Discharge needs, contact Care Management DC Support Team at [phone] opt. 2  Send all requests for admission clinical reviews, approved or denied determinations and any other requests to dedicated fax number below belonging to the campus where the patient is receiving treatment. List of dedicated fax numbers for the Facilities:  FACILITY NAME UR FAX NUMBER   ADMISSION DENIALS (Administrative/Medical Necessity) [phone]   DISCHARGE SUPPORT TEAM (NETWORK) [phone]   PARENT CHILD HEALTH (Maternity/NICU/Pediatrics) [phone]   [facility]  [phone]   [facility] [phone]   [facility] [phone]   [facility] [phone]   [facility] [phone]   [facility] [phone]   [facility] [phone]   [facility] [phone]   [facility] [phone]   [facility] [phone]   [facility] [phone]   [facility] [phone]

## 2025-03-06 NOTE — PROGRESS NOTES
Patient:    MRN:  [id number]    Aidin Request ID:  [id number]    Level of care reserved:  Skilled Nursing Facility    Partner Reserved:  [facility], [place] [id number] [phone]    Clinical needs requested:    Geography searched:  10 miles around [id number]    Start of Service:    Request sent:  8:33am EST on 3/6/2025 by Farzana Singh    Partner reserved:  1:20pm EST on 3/6/2025 by Farzana Singh    Choice list shared:

## 2025-03-06 NOTE — MALNUTRITION/BMI
This medical record reflects one or more clinical indicators suggestive of malnutrition and/or morbid obesity.    Malnutrition Findings:   Adult Malnutrition type: Chronic illness  Adult Degree of Malnutrition: Other severe protein calorie malnutrition                   360 Statement: Pt presents with severe protein calorie malnutrition as evidenced by sunken orbitals, b/l temporal depressions and visble clavicle bone. Treat with diet and supplements BID.    BMI Findings:           Body mass index is 27.09 kg/m².     See Nutrition note dated 3/6/25  for additional details.  Completed nutrition assessment is viewable in the nutrition documentation.

## 2025-03-06 NOTE — QUICK NOTE
Critical Care Interval Transfer Note:    Brief Hospital Summary:  Patient came in from [facility] 3/6/25 with fever and hypotension. Found to have UTI and Flu A. Also with JOHNY and septic shock requiring vasopressors which have been off since 1420 3/6/25. Ur Cx (+) GNR and he's Day 2 of tamiflu, vanc and cefepime. He came in with Anion gap and nonanion gap acidosis. Renal function improved but not at base, looks like he has renal tubular acidosis. Started bicarb infusion today and BMP continues to improve, Will Consult Nephrology. He was also started on midodrine today (with hold parameters), which should be weaned off as able.  Home Keppra is 1000 mg BID, but currently renally adjusted. Cefepime and Tamiflu also Renally adjusted. Keppra, Cefepime, and Tamiflu doses need to be adjusted daily as Renal function improves.    DC Vanco when MRSA swab returns.    Barriers to discharge:   F/U cultures and narrow abx as able  wean off midodrine     Consults: IP CONSULT TO PHARMACY    Recommended to review admission imaging for incidental findings and document in discharge navigator: Chart reviewed, no known incidental findings noted at this time.      Discharge Plan: Anticipate discharge in 48-72 hrs to [facility] Plan: Voiding trial after improvement in ambulation        Patient seen and evaluated by Critical Care today and deemed to be appropriate for transfer to Med Surg. Spoke to Dr. Powers from [facility] to accept transfer. Critical care can be contacted via SecureChat with any questions or concerns. Please use the Critical Care AP Role in Secure Chat for any provider inquires until the patient is transferred out of the ICU or until tomorrow at 0600.

## 2025-03-06 NOTE — ASSESSMENT & PLAN NOTE
The setting of UTI, JOHNY, and septic shock  Serum bicarb 12, anion gap of 16, lactic acid 1.1  S/p 3l IVF in ER    Plan  Trend BMP and renal function  Started on empiric antibiotics, see plan above under septic shock

## 2025-03-06 NOTE — ASSESSMENT & PLAN NOTE
Etiology: Volume depletion/shock/sepsis leading to ATN/obstructive uropathy as well please see below  Baseline creatinine 1.26 that was from November 2022 so most likely has element of chronic kidney disease  Creatinine on admission 6.38 from 3/5/2025 which is the peak  Current creatinine improved to 3.03 with good urine output  UA: Large occult blood/large leukocytes/2+ proteinuria/30-50 RBCs/30-50 WBCs/innumerable bacteria  Imaging: CT scan demonstrating severe bilateral hydroureteronephrosis extending to UVJ's severe right renal cortical thinning no loculated perinephric collection    Recommend:  - Anderson catheter placed  - Replete hypokalemia  - Monitor phosphorus slightly elevated  - Would reimage in a.m. to make sure obstruction has improved with renal ultrasound bedside  - Pressor support/midodrine for blood pressure support  - IV fluids with bicarbonate infusion for metabolic acidosis  - Avoid nephrotoxic agents such as NSAIDs as well as avoiding hypotension    Urine output excellent over 3 L  No indication for renal replacement therapy/CRRT for the moment as long as we can control the metabolic acidosis other electrolytes are acceptable; actually potassium level

## 2025-03-06 NOTE — PLAN OF CARE
Problem: Potential for Falls  Goal: Patient will remain free of falls  Description: INTERVENTIONS:  - Educate patient/family on patient safety including physical limitations  - Instruct patient to call for assistance with activity   - Consult OT/PT to assist with strengthening/mobility   - Keep Call bell within reach  - Keep bed low and locked with side rails adjusted as appropriate  - Keep care items and personal belongings within reach  - Initiate and maintain comfort rounds  - Make Fall Risk Sign visible to staff  - Offer Toileting every 2 Hours, in advance of need  - Initiate/Maintain bed alarm  - Obtain necessary fall risk management equipment  - Apply yellow socks and bracelet for high fall risk patients  - Consider moving patient to room near nurses station  Outcome: Progressing     Problem: Prexisting or High Potential for Compromised Skin Integrity  Goal: Skin integrity is maintained or improved  Description: INTERVENTIONS:  - Identify patients at risk for skin breakdown  - Assess and monitor skin integrity  - Assess and monitor nutrition and hydration status  - Monitor labs   - Assess for incontinence   - Turn and reposition patient  - Assist with mobility/ambulation  - Relieve pressure over bony prominences  - Avoid friction and shearing  - Provide appropriate hygiene as needed including keeping skin clean and dry  - Evaluate need for skin moisturizer/barrier cream  - Collaborate with interdisciplinary team   - Patient/family teaching  - Consider wound care consult   Outcome: Progressing     Problem: PAIN - ADULT  Goal: Verbalizes/displays adequate comfort level or baseline comfort level  Description: Interventions:  - Encourage patient to monitor pain and request assistance  - Assess pain using appropriate pain scale  - Administer analgesics based on type and severity of pain and evaluate response  - Implement non-pharmacological measures as appropriate and evaluate response  - Consider cultural and  social influences on pain and pain management  - Notify physician/advanced practitioner if interventions unsuccessful or patient reports new pain  Outcome: Progressing     Problem: INFECTION - ADULT  Goal: Absence or prevention of progression during hospitalization  Description: INTERVENTIONS:  - Assess and monitor for signs and symptoms of infection  - Monitor lab/diagnostic results  - Monitor all insertion sites, i.e. indwelling lines, tubes, and drains  - Monitor endotracheal if appropriate and nasal secretions for changes in amount and color  - Ewen appropriate cooling/warming therapies per order  - Administer medications as ordered  - Instruct and encourage patient and family to use good hand hygiene technique  - Identify and instruct in appropriate isolation precautions for identified infection/condition  Outcome: Progressing  Goal: Absence of fever/infection during neutropenic period  Description: INTERVENTIONS:  - Monitor WBC    Outcome: Progressing     Problem: SAFETY ADULT  Goal: Patient will remain free of falls  Description: INTERVENTIONS:  - Educate patient/family on patient safety including physical limitations  - Instruct patient to call for assistance with activity   - Consult OT/PT to assist with strengthening/mobility   - Keep Call bell within reach  - Keep bed low and locked with side rails adjusted as appropriate  - Keep care items and personal belongings within reach  - Initiate and maintain comfort rounds  - Make Fall Risk Sign visible to staff  - Offer Toileting every 2 Hours, in advance of need  - Initiate/Maintain bed alarm  - Obtain necessary fall risk management equipment  - Apply yellow socks and bracelet for high fall risk patients  - Consider moving patient to room near nurses station  Outcome: Progressing  Goal: Maintain or return to baseline ADL function  Description: INTERVENTIONS:  -  Assess patient's ability to carry out ADLs; assess patient's baseline for ADL function and  identify physical deficits which impact ability to perform ADLs (bathing, care of mouth/teeth, toileting, grooming, dressing, etc.)  - Assess/evaluate cause of self-care deficits   - Assess range of motion  - Assess patient's mobility; develop plan if impaired  - Assess patient's need for assistive devices and provide as appropriate  - Encourage maximum independence but intervene and supervise when necessary  - Involve family in performance of ADLs  - Assess for home care needs following discharge   - Consider OT consult to assist with ADL evaluation and planning for discharge  - Provide patient education as appropriate  Outcome: Progressing  Goal: Maintains/Returns to pre admission functional level  Description: INTERVENTIONS:  - Perform AM-PAC 6 Click Basic Mobility/ Daily Activity assessment daily.  - Set and communicate daily mobility goal to care team and patient/family/caregiver.   - Collaborate with rehabilitation services on mobility goals if consulted  - Perform Range of Motion 4 times a day.  - Reposition patient every 2 hours.  - Dangle patient 3 times a day  - Stand patient 3 times a day  - Ambulate patient 3 times a day  - Out of bed to chair 3 times a day   - Out of bed for meals 3 times a day  - Out of bed for toileting  - Record patient progress and toleration of activity level   Outcome: Progressing     Problem: DISCHARGE PLANNING  Goal: Discharge to home or other facility with appropriate resources  Description: INTERVENTIONS:  - Identify barriers to discharge w/patient and caregiver  - Arrange for needed discharge resources and transportation as appropriate  - Identify discharge learning needs (meds, wound care, etc.)  - Arrange for interpretive services to assist at discharge as needed  - Refer to Case Management Department for coordinating discharge planning if the patient needs post-hospital services based on physician/advanced practitioner order or complex needs related to functional status,  cognitive ability, or social support system  Outcome: Progressing     Problem: Knowledge Deficit  Goal: Patient/family/caregiver demonstrates understanding of disease process, treatment plan, medications, and discharge instructions  Description: Complete learning assessment and assess knowledge base.  Interventions:  - Provide teaching at level of understanding  - Provide teaching via preferred learning methods  Outcome: Progressing

## 2025-03-06 NOTE — ASSESSMENT & PLAN NOTE
Secondary to sepsis: Check echo/cortisol/TSH rule out other causes  Transitioning to midodrine 10 mg 3 times a day off of pressor support  Continue IV fluids

## 2025-03-06 NOTE — PROGRESS NOTES
Trevor Waldrop is a 60 y.o. male who is currently ordered Vancomycin IV with management by the Pharmacy Consult service.  Relevant clinical data and objective / subjective history reviewed.  Vancomycin Assessment:  Indication and Goal AUC/Trough: Pneumonia (goal -600, trough >10); Urinary tract infection (goal -600, trough >10); Bacteremia (goal -600, trough >10)  Clinical Status: stable  Micro:   Pending  Renal Function:  SCr: 3.68 mg/dL  CrCl: 12.6 mL/min  Renal replacement: Not on dialysis  Days of Therapy: 2  Current Dose: 500mg daily PRN if level </=15  Vancomycin Plan: random level resulted 12.5. Since the level is <15, a dose of 500mg will be given today.   New Dosing: no change  Next Level: 3/7/25 at 0600  Renal Function Monitoring: Daily BMP and UOP  Pharmacy will continue to follow closely for s/sx of nephrotoxicity, infusion reactions and appropriateness of therapy.  BMP and CBC will be ordered per protocol. We will continue to follow the patient’s culture results and clinical progress daily. Also, cefepime and tamiflu will be adjusted if necessary.     Brent Barrera, Pharmacist, PharmD, BCPS

## 2025-03-06 NOTE — PROGRESS NOTES
Progress Note - Critical Care/ICU   Name: Trevor Waldrop 60 y.o. male I MRN: [id number]  Unit/Bed#: -01 I Date of Admission: 3/5/2025   Date of Service: 3/5/2025 I Hospital Day: 0      Assessment & Plan  Septic shock (HCC)  Patient presented from nursing facility with reports of fever and hypotension requiring subcutaneous IV fluids.  SIRS criteria: Tachycardia, tachypnea, fever  Found to be influenza A positive and UA suggestive of UTI  CT chest abdomen pelvis showing left lower lobe consolidation  In the ER patient was hypotensive and had transient response to IV fluids.  Briefly started on Levophed (5 hours) for blood pressure support  Started on Tamiflu, cefepime, vancomycin    Plan  Follow-up blood cultures, urine culture, MRSA, Legionella/strep antigen  Goal MAP >65  Continue Tamiflu, cefepime, vancomycin   Left lower lobe pneumonia  Started on empiric antibiotics, see plan above under septic shock  Influenza A  Pt influenza A positive in the ER.  From nursing facility where they have an influenza outbreak    Plan  Tamiflu, D2/5   Tamiflu dose renally adjusted, continue to adjust as renal function improves  Acute cystitis with hematuria  UA on admission suggestive of UTI  Anderson placed in ER and noted to have hematuria   Started on cefepime and vancomycin in the ER    Plan  Continue close monitoring of I/O status and renal function daily  Continue antibiotics outlined above  Follow-up cultures  JOHNY (acute kidney injury) (HCC)  Patient presented with creatinine of 6.36, prior creatinines in 2022 were 1.1-1.4  In the setting of UTI and shock  Anderson placed in ER with noted hematuria  CT showing severe bilateral hydronephroureterosis extending through distended bladder.  No calculi or perinephritic collection seen  Patient received 3 L IV fluid in the ER    Plan  Trend I/O status and renal indices closely  Check renal ultrasound  Consider nephrology consult if renal function does not improve with fluid  resuscitation and UTI treatment  Metabolic acidosis  The setting of UTI, JOHNY, and septic shock  Serum bicarb 12, anion gap of 16, lactic acid 1.1  S/p 3l IVF in ER    Plan  Trend BMP and renal function  Started on empiric antibiotics, see plan above under septic shock  GERD with esophagitis  Home PPI and as needed Tums  Holding home Pepcid  Bipolar disorder (HCC)  Continue home melatonin and Remeron  Localization-related (focal) (partial) symptomatic epilepsy and epileptic syndromes with complex partial seizures, not intractable, with status epilepticus (HCC)  Patient with history of seizures, follows with neurology  Continuing home Lamictal 200 mg twice daily and Keppra 1000 mg twice daily    Disposition: Stepdown Level 1    ICU Core Measures     A: Assess, Prevent, and Manage Pain Has pain been assessed? Yes  Need for changes to pain regimen? No   B: Both SAT/SAT  N/A   C: Choice of Sedation RASS Goal: N/A patient not on sedation  Need for changes to sedation or analgesia regimen? NA   D: Delirium CAM-ICU: Unable to perform secondary to Dementia or other chronic cognitive dysfunction   E: Early Mobility  Plan for early mobility? Yes   F: Family Engagement Plan for family engagement today? Yes       Antibiotic Review: Awaiting culture results.     Review of Invasive Devices:    Anderson Plan: Continue for accurate I/O monitoring for 48 hours    Prophylaxis:  VTE VTE covered by:  heparin (porcine), Subcutaneous, 5,000 Units at 03/05/25 1440       Stress Ulcer  covered byfamotidine (PEPCID) 20 mg tablet [[id number]] (Long-Term Med), pantoprazole (PROTONIX) EC tablet 20 mg [[id number]]         24 Hour Events : No acute events overnight.     Subjective   Review of Systems: Review of Systems   Constitutional: Negative.    HENT: Negative.     Eyes: Negative.    Respiratory: Negative.     Cardiovascular: Negative.    Gastrointestinal: Negative.    Endocrine: Negative.    Genitourinary: Negative.    Musculoskeletal: Negative.     Skin: Negative.    Allergic/Immunologic: Negative.    Neurological: Negative.    Hematological: Negative.    Psychiatric/Behavioral: Negative.         Objective :                   Vitals I/O      Most Recent Min/Max in 24hrs   Temp (!) 100.9 °F (38.3 °C) Temp  Min: 97.3 °F (36.3 °C)  Max: 101.5 °F (38.6 °C)   Pulse (!) 115 Pulse  Min: 84  Max: 126   Resp 17 Resp  Min: 15  Max: 32   /63 BP  Min: 77/55  Max: 137/83   O2 Sat 97 % SpO2  Min: 93 %  Max: 99 %      Intake/Output Summary (Last 24 hours) at 3/5/2025 1905  Last data filed at 3/5/2025 1801  Gross per 24 hour   Intake 2802.08 ml   Output 2150 ml   Net 652.08 ml       Diet Regular; Regular House; Fluid Restriction 1800 ML    Invasive Monitoring           Physical Exam   Physical Exam  Vitals and nursing note reviewed.   Eyes:      Extraocular Movements: Extraocular movements intact.      Pupils: Pupils are equal, round, and reactive to light.   Skin:     General: Skin is warm and dry.      Capillary Refill: Capillary refill takes less than 2 seconds.      Coloration: Skin is pale. Skin is not jaundiced.   HENT:      Head: Normocephalic.      Mouth/Throat:      Mouth: Mucous membranes are moist.   Cardiovascular:      Rate and Rhythm: Regular rhythm. Tachycardia present.      Pulses: Normal pulses.   Musculoskeletal:      Comments: L BKA and R AKA    Abdominal: General: There is no distension.      Palpations: Abdomen is soft.      Tenderness: There is no abdominal tenderness.   Constitutional:       General: He is not in acute distress.     Appearance: He is not ill-appearing.   Pulmonary:      Effort: Pulmonary effort is normal.      Breath sounds: Normal breath sounds.   Neurological:      General: No focal deficit present.      Mental Status: He is alert and oriented to person, place and time. Mental status is at baseline. He is calm.      Sensory: Sensation is intact.      Motor: gross motor function is at baseline for patient. Strength full and  intact in all extremities.   Genitourinary/Anorectal:  Anderson present.        Diagnostic Studies        Lab Results: I have reviewed the following results:     Medications:  Scheduled PRN   [START ON 3/6/2025] cefepime, 1,000 mg, Q24H  chlorhexidine, 15 mL, Q12H DANIELLE  heparin (porcine), 5,000 Units, Q8H DANIELLE  ipratropium, 0.5 mg, TID  [Held by provider] lamoTRIgine, 200 mg, BID  levalbuterol, 1.25 mg, TID  [Held by provider] levETIRAcetam, 1,000 mg, Q12H DANIELLE  melatonin, 6 mg, HS  mirtazapine, 7.5 mg, HS  [START ON 3/7/2025] oseltamivir, 30 mg, Q48H  [START ON 3/6/2025] pantoprazole, 20 mg, Early Morning      acetaminophen, 650 mg, Q4H PRN  calcium carbonate, 1,000 mg, BID PRN  [START ON 3/6/2025] vancomycin, 12.5 mg/kg, Once PRN       Continuous          Labs:   CBC    Recent Labs     03/05/25  0444   WBC 8.47   HGB 10.2*   HCT 30.5*      BANDSPCT 5     BMP    Recent Labs     03/05/25  0444   SODIUM 135   K 5.1      CO2 12*   AGAP 16*   *   CREATININE 6.38*   CALCIUM 9.0       Coags    Recent Labs     03/05/25  0611   INR 1.38*   PTT 38*        Additional Electrolytes  Recent Labs     03/05/25  0444   MG 2.6   PHOS 4.0          Blood Gas    No recent results  No recent results LFTs  Recent Labs     03/05/25  0444   ALT 21   AST 36   ALKPHOS 62   ALB 3.3*   TBILI 0.33       Infectious  Recent Labs     03/05/25  0444   PROCALCITONI 14.58*     Glucose  Recent Labs     03/05/25  0444   GLUC 98

## 2025-03-06 NOTE — ASSESSMENT & PLAN NOTE
Pt influenza A positive in the ER.  From nursing facility where they have an influenza outbreak    Plan  Tamiflu, D2/5   Tamiflu dose renally adjusted, continue to adjust as renal function improves

## 2025-03-06 NOTE — CASE MANAGEMENT
Case Management Assessment & Discharge Planning Note    Patient name Trevor Waldrop  Location /-01 MRN [id number]  : 1965 Date 3/6/2025       Current Admission Date: 3/5/2025  Current Admission Diagnosis:Septic shock (HCC)   Patient Active Problem List    Diagnosis Date Noted Date Diagnosed    Influenza A 2025     Left lower lobe pneumonia 2025     Acute cystitis with hematuria 2025     JOHNY (acute kidney injury) (HCC) 2025     Metabolic acidosis 2025     Urinary retention 2022     Septic shock (HCC) 2020     Hyponatremia 2020     Hypertension 2020     Bipolar disorder (HCC) 10/01/2019     Traumatic amputation of legs, 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     Depression 10/01/2019     Localization-related (focal) (partial) symptomatic epilepsy and epileptic syndromes with complex partial seizures, not intractable, with status epilepticus (HCC) 10/01/2019     GERD with esophagitis 2019     Anemia 2019     Chronic hepatitis C without hepatic coma (HCC) 2019     GERD (gastroesophageal reflux disease) 2017     Hypokalemia 2016       LOS (days): 1  Geometric Mean LOS (GMLOS) (days):   Days to GMLOS:     OBJECTIVE:    Risk of Unplanned Readmission Score: 24.38         Current admission status: Inpatient       Preferred Pharmacy:   [facility] [address] [address]  Phone: [phone] Fax: [phone]    Primary Care Provider: Manoj Perez DO    Primary Insurance: [facility]  Secondary Insurance:     ASSESSMENT:  Active Health Care Proxies    There are no active Health Care Proxies on file.                 Readmission Root Cause  30 Day Readmission: No    Patient Information  Admitted from:: Facility (LTC at [facility])  Mental Status: Alert  During Assessment patient was accompanied by: Not accompanied during  assessment  Assessment information provided by:: Other - please comment ([facility]  Primary Caregiver: Other (Comment)  Caregiver's Name:: [facility]  Caregiver's Relationship to Patient:: Facility Staff  Caregiver's Telephone Number:: [phone]  Support Systems: Self, Family members  County of Residence: [place]  What [facility] do you live in?: [place]  Home entry access options. Select all that apply.: No steps to enter home  Type of Current Residence: Facility  Upon entering residence, is there a bedroom on the main floor (no further steps)?: Yes  Upon entering residence, is there a bathroom on the main floor (no further steps)?: Yes  Living Arrangements: Lives in Facility (LTC at [facility])  Is patient a ?: No    Activities of Daily Living Prior to Admission  Functional Status: Independent  Completes ADLs independently?: No  Level of ADL dependence: Assistance  Ambulates independently?: Yes  Does patient use assisted devices?: Yes  Assisted Devices (DME) used: Wheelchair  Does patient currently own DME?: Yes  What DME does the patient currently own?: Wheelchair  Does the patient have a history of Short-Term Rehab?: Yes         Patient Information Continued  Income Source: SSI/SSD  Does patient have prescription coverage?: Yes  Does patient receive dialysis treatments?: No  Does patient have a history of substance abuse?: No  Does patient have a history of Mental Health Diagnosis?: Yes  Is patient receiving treatment for mental health?: Yes  Has patient received inpatient treatment related to mental health in the last 2 years?: No         Means of Transportation  Means of Transport to Appts:: Other (Comment) (Facility transports.)          DISCHARGE DETAILS:    Discharge planning discussed with:: [facility]  [place] of Choice: Yes  Comments - Freedom of Choice: Pt to return to [facility] on discharge.  CM contacted family/caregiver?: Yes ([facility])  Were Treatment  Team discharge recommendations reviewed with patient/caregiver?: Yes  Did patient/caregiver verbalize understanding of patient care needs?: Yes  Were patient/caregiver advised of the risks associated with not following Treatment Team discharge recommendations?: Yes    Contacts  Patient Contacts: Bruno Almanza  Relationship to Patient:: Other (Comment) (Facility)  Contact Method: Phone  Phone Number: [phone]  Reason/Outcome: Continuity of Care, Discharge Planning    Requested Home Health Care         Is the patient interested in HHC at discharge?: No    DME Referral Provided  Referral made for DME?: No    Other Referral/Resources/Interventions Provided:  Interventions: SNF  Referral Comments: BRANDYN referral sent to [facility]. They are able to accept pt back on discharge.    Treatment Team Recommendation: Facility Return, SNF  Discharge Destination Plan:: Facility Return, SNF     Additional Comments: CM contacted [facility] and confirmed pt is a LTC resident and they are able to accept pt back on discharge. Pt is able to get himself in and out of his wheelchair independently and is able to self propel in the wheelchair as well. Discharge plan is for pt to return to [facility] when medically stable.

## 2025-03-06 NOTE — UTILIZATION REVIEW
NOTIFICATION OF INPATIENT ADMISSION   AUTHORIZATION REQUEST   SERVICING FACILITY:   [place] [address]  Tax ID: [id number]  NPI: [id number] ATTENDING PROVIDER:  Attending Name and NPI#: Jovany Chang Md [[id number]]  Address: [address] [place] [address]  Phone: [phone]   ADMISSION INFORMATION:  Place of Service: Inpatient [facility]  Place of Service Code: 21  Inpatient Admission Date/Time: 3/5/25  9:02 AM  Discharge Date/Time: No discharge date for patient encounter.  Admitting Diagnosis Code/Description:  Urinary retention [R33.9]  UTI (urinary tract infection) [N39.0]  Metabolic acidosis [E87.20]  Pneumonia [J18.9]  Influenza A [J10.1]  Fever [R50.9]  JOHNY (acute kidney injury) (HCC) [N17.9]  Acute cystitis with hematuria [N30.01]  Severe sepsis (HCC) [A41.9, R65.20]     UTILIZATION REVIEW CONTACT:  Shani Boyer, Utilization   Network Utilization Review Department  Phone: [phone]  Fax: [phone]  Email: [email or web address]  Contact for approvals/pending authorizations, clinical reviews, and discharge.     PHYSICIAN ADVISORY SERVICES:  Medical Necessity Denial & Nrjs-rs-Oqav Review  Phone: [phone]  Fax: [phone]  Email: [email or web address]     DISCHARGE SUPPORT TEAM:  For Patients Discharge Needs & Updates  Phone: [phone] opt. 2 Fax: [phone]  Email: [email or web address]

## 2025-03-06 NOTE — PHYSICAL THERAPY NOTE
PHYSICAL THERAPY EVALUATION NOTE      Patient Name: Trevor Waldrop  Today's Date: 3/6/2025    AGE:   60 y.o.  Mrn:   [id number]  ADMIT DX:  Urinary retention [R33.9]  UTI (urinary tract infection) [N39.0]  Metabolic acidosis [E87.20]  Pneumonia [J18.9]  Influenza A [J10.1]  Fever [R50.9]  JOHNY (acute kidney injury) (HCC) [N17.9]  Acute cystitis with hematuria [N30.01]  Severe sepsis (HCC) [A41.9, R65.20]    Past Medical History:   Diagnosis Date    Alcohol abuse     Anemia 2019    On iron.  Unclear if ever documented iron deficient    GERD (gastroesophageal reflux disease)     GERD with esophagitis 2019    EGD in [facility] 2019    Hx of AKA (above knee amputation) (HCC)     Right    Infectious viral hepatitis     Seizures (HCC)     Seizures (HCC)     Ulcer of esophagus      Length Of Stay: 1  PHYSICAL THERAPY EVALUATION :   Patient's identity confirmed via 2 patient identifiers (full name and ) at start of session       25 1442   PT Last Visit   PT Visit Date 25   Note Type   Note type Evaluation   Pain Assessment   Pain Assessment Tool Soria-Baker FACES   Pain Score No Pain   Soria-Baker FACES Pain Rating 0   Restrictions/Precautions   Weight Bearing Precautions Per Order No   Other Precautions Droplet precautions;Contact/isolation   Home Living   Type of Home SNF  ([facility])   Home Layout One level   Home Equipment Wheelchair-manual   Additional Comments Called [facility] and spoke w/ RN staff. At baseline, Trevor transfers self to/from . No use of prosthetics or slide board at baseline. Per chart review and previous admission at [facility] in , patient utilized A-P transfer for mobility   Prior Function   Level of [place] Independent with ADLs;Modified independent with wheelchair   Lives With Facility staff   Receives Help From Personal care attendant   IADLs Family/Friend/Other provides  transportation;Family/Friend/Other provides medication management;Family/Friend/Other provides meals   Falls in the last 6 months 1 to 4  (per staff member, patient w/ recent fall)   General   Family/Caregiver Present No   Cognition   Overall Cognitive Status Impaired   Arousal/Participation Arousable   Orientation Level Oriented to person   Following Commands Follows one step commands inconsistently   Comments Trevor requires significantly increased time to respond to questions. Difficulty verbalizing how to transfers at baseline   RLE Assessment   RLE Assessment   (AKA)   Strength RLE   RLE Overall Strength 3/5  (at least, functionally observed)   Tone RLE   RLE Tone Hypertonic  (able to achieve 0* extension with significantly increased time in gravity-dependent position)   LLE Assessment   LLE Assessment   (BKA)   Strength LLE   LLE Overall Strength   (at least, functionally observed)   Tone LLE   LLE Tone Hypertonic  (able to achieve 0* extension with significantly increased time in gravity-dependent position)   Vision-Basic Assessment   Current Vision   (? R torticollis)   Bed Mobility   Supine to Sit 2  Maximal assistance   Additional items Assist x 1   Sit to Supine 2  Maximal assistance   Additional items Assist x 2   Additional Comments Is able to sit EOB w/ max A x 2. Significant retropulsion and posterior trunk lean   Transfers   Additional Comments Deferred attempting a transfer due to very poor sitting balance, poor command following   Balance   Static Sitting Zero   Activity Tolerance   Activity Tolerance Other (Comment)  (cognition)   Medical Staff Made Aware JAKOB Martin   Nurse Made Aware KOREY Stern   Assessment   Problem List Decreased strength;Decreased range of motion;Impaired balance;Decreased endurance;Decreased mobility;Decreased cognition   Assessment Trevor Waldrop is a 60 y.o. Male who presents to [facility] on 3/5/2025 from [facility] w/ c/o hypotension and diagnosis of septic shock, flu A.  Orders for PT eval and treat received. Pt presents w/ comorbidities of hx of L BKA, R AKA, bipolar disorder, hx of ETOH abuse, GERD, chronic hep C, HTN. At baseline, pt mobilizes modified independently w/ transfers to , and reports + falls in the last 6 months. Upon evaluation, pt presents w/ the following deficits: impaired balance, decreased endurance, and impaired cognition . Upon eval, pt requires total A x 2 for bed mobility Based on this PT evaluation today, patient's discharge recommendation is for Level III - Low Rehab Resource Intensity.  Anticipate significant contribution to mobility difficulty is his AMS. Spoke w/ [facility] and they report they plan on initiating PT and use of slideboard upon return to facility. Will DC PT   Goals   Patient Goals to lay back down   Discharge Recommendation   Rehab Resource Intensity Level, PT III (Minimum Resource Intensity)  (return to facility w/ skilled PT)   Additional Comments Per facility, they report that they plan on having Dwyane participate in PT and utilize a slideboard   AM-PAC Basic Mobility Inpatient   Turning in Flat Bed Without Bedrails 1   Lying on Back to Sitting on Edge of Flat Bed Without Bedrails 1   Moving Bed to Chair 1   Standing Up From Chair Using Arms 1   Walk in Room 1   Climb 3-5 Stairs With Railing 1   Basic Mobility Inpatient Raw Score 6   Turning Head Towards Sound 2   Follow Simple Instructions 2   Low Function Basic Mobility Raw Score  10   Low Function Basic Mobility Standardized Score  14.65   [facility] Highest Level Of Mobility   -HL Goal 2: Bed activities/Dependent transfer   -HLM Achieved 3: Sit at edge of bed   End of Consult   Patient Position at End of Consult Supine;All needs within reach;Bed/Chair alarm activated       The patient's AM-PAC Basic Mobility Inpatient Short Form Raw Score is 6, Standardized Score is  . A standardized score less than 38.32 (raw score of 16) suggests the patient may benefit from  discharge to post-acute rehabilitation services which may not coincide with above PT recommendations. However please refer to therapist recommendation for discharge planning given other factors that may influence destination.      Jacki Whitman PT, DPT

## 2025-03-06 NOTE — ASSESSMENT & PLAN NOTE
Patient presented from nursing facility with reports of fever and hypotension requiring subcutaneous IV fluids.  SIRS criteria: Tachycardia, tachypnea, fever  Found to be influenza A positive and UA suggestive of UTI  CT chest abdomen pelvis showing left lower lobe consolidation  In the ER patient was hypotensive and had transient response to IV fluids.  Briefly started on Levophed (5 hours) for blood pressure support  Started on Tamiflu, cefepime, vancomycin    Plan  Follow-up blood cultures, urine culture, MRSA, Legionella/strep antigen  Goal MAP >65  Continue Tamiflu, cefepime, vancomycin

## 2025-03-06 NOTE — OCCUPATIONAL THERAPY NOTE
Occupational Therapy Evaluation     Patient Name: Trevor Waldrop  Today's Date: 3/6/2025  Problem List  Principal Problem:    Septic shock (HCC)  Active Problems:    GERD with esophagitis    Bipolar disorder (HCC)    Localization-related (focal) (partial) symptomatic epilepsy and epileptic syndromes with complex partial seizures, not intractable, with status epilepticus (HCC)    Influenza A    Left lower lobe pneumonia    Acute cystitis with hematuria    JOHNY (acute kidney injury) (HCC)    Metabolic acidosis    Severe protein-calorie malnutrition (HCC)    Past Medical History  Past Medical History:   Diagnosis Date    Alcohol abuse     Anemia 6/14/2019    On iron.  Unclear if ever documented iron deficient    GERD (gastroesophageal reflux disease)     GERD with esophagitis 6/14/2019    EGD in [facility] 2019    Hx of AKA (above knee amputation) (HCC)     Right    Infectious viral hepatitis     Seizures (HCC)     Seizures (HCC)     Ulcer of esophagus      Past Surgical History  Past Surgical History:   Procedure Laterality Date    BELOW KNEE LEG AMPUTATION      Left    EGD  04/25/2017    LUNG DECORTICATION Left            03/06/25 1438   OT Last Visit   OT Visit Date 03/06/25   Note Type   Note type Evaluation   Pain Assessment   Pain Assessment Tool Soria-Baker FACES   Soria-Baker FACES Pain Rating 0   Restrictions/Precautions   Weight Bearing Precautions Per Order No   Other Precautions Cognitive;Bed Alarm;Fall Risk;Multiple lines;Telemetry;Droplet precautions;Contact/isolation  (BLE Traumatic amputations - L BKA, R AKA)   Home Living   Type of Home SNF  ([facility])   Home Equipment Wheelchair-manual   Additional Comments PT spoke with staff at  s/p OT eval  - pt typically Mod I to scoot txfer to manual WC without use of slide board. Pt reportedly IND with all ADLs. However, staff reports pt does not change clothes often & will frequently stay in his clothes for days   Prior Function   Level of [place]  "Independent with ADLs;Modified independent with wheelchair;Needs assistance with IADLS   Lives With Facility staff   Receives Help From Personal care attendant   IADLs Family/Friend/Other provides transportation;Family/Friend/Other provides medication management;Family/Friend/Other provides meals   Falls in the last 6 months 1 to 4   General   Additional Pertinent History Appears to have torticollis facing L side   Family/Caregiver Present No   Additional General Comments PT spoke with staff at  s/p OT evaluation. Staff reports pt with recent fall (not documented/reason for admission) & that they plan on beginning therapy & working on slide board txfers with pt at facility   Subjective   Subjective Pt received supine in bed. \"You're gonna make me fall\"   ADL   Eating Assistance 3  Moderate Assistance   Eating Deficit Increased time to complete;Bringing food to mouth assist;Supervision/safety;Verbal cueing   Grooming Assistance 3  Moderate Assistance   UB Bathing Assistance 3  Moderate Assistance   LB Bathing Assistance 3  Moderate Assistance   UB Dressing Assistance 3  Moderate Assistance   LB Dressing Assistance 2  Maximal Assistance   Toileting Assistance  2  Maximal Assistance   Bed Mobility   Supine to Sit 2  Maximal assistance   Additional items Assist x 2;Increased time required;Verbal cues;LE management   Sit to Supine 2  Maximal assistance   Additional items Assist x 2;Increased time required;Verbal cues;LE management   Additional Comments Max/Total A for sitting EOB. Pt resistant to movement   Transfers   Sliding Board transfer Unable to assess   Balance   Static Sitting Poor -   Activity Tolerance   Activity Tolerance Other (Comment);Patient limited by fatigue  (Pt willingness to participate/fear)   Medical Staff Made Aware PT Jacki   Nurse Made Aware KOREY STREET Assessment   RUE Assessment X   LUE Assessment   LUE Assessment X   Cognition   Overall Cognitive Status Impaired   Arousal/Participation " Alert   Attention Attends with cues to redirect   Orientation Level Oriented to person   Memory Unable to assess   Following Commands Follows one step commands with increased time or repetition   Comments Delayed processing, pt difficulty verbalizing how he txfers at facility   Assessment   Prognosis Poor   Assessment Pt is a 60 y.o. male seen for OT evaluation at [facility], admitted 3/5/2025 w/ Septic shock (HCC). OT completed extensive review of pt's medical and social history. Comorbidities affecting pt's functional performance at time of assessment include: bipolar disorder, epilepsy, flu A, LLL PNA, hepatitis C, traumatic amputation of BLE (L BKA, R AKA), h/o alcohol & drug abuse . Prior to admission, pt was living as a LTC resident at [facility]. Pt was IND with ADLs & Mod I with WC. Upon evaluation, pt presents to OT as Max Ax2 for supine <> sit, mod/Max A for ADLs. Based on findings, pt is of high complexity. The patient's raw score on the AM-PAC Daily Activity inpatient short form is 12, standardized score is 30.6, less than 39.4. Patients at this level are likely to benefit from DC to post-acute rehabilitation services. Please refer to the recommendation of the Occupational Therapist for safe DC planning. At this time, OT recommendations at time of discharge are return to facility with level III resources. PT spoke with staff at [facility] s/p evaluation. Staff at [facility] report plan is in place for pt to return to facility with therapy services. No further acute OT needs indicated at this time, DC OT orders.   Goals   Patient Goals Pt wants to lie back down   Plan   OT Frequency Eval only   Discharge Recommendation   Rehab Resource Intensity Level, OT III (Minimum Resource Intensity)   AM-PAC Daily Activity Inpatient   Lower Body Dressing 2   Bathing 2   Toileting 2   Upper Body Dressing 2   Grooming 2   Eating 2   Daily Activity Raw Score 12   Daily Activity Standardized Score (Calc  for Raw Score >=11) 30.6   AM-PAC Applied Cognition Inpatient   Following a Speech/Presentation 2   Understanding Ordinary Conversation 3   Taking Medications 2   Remembering Where Things Are Placed or Put Away 2   Remembering List of 4-5 Errands 2   Taking Care of Complicated Tasks 1   Applied Cognition Raw Score 12   Applied Cognition Standardized Score 28.82   End of Consult   Education Provided Yes   Patient Position at End of Consult Supine;Bed/Chair alarm activated;All needs within reach   Nurse Communication Nurse aware of consult     Veronica Garces OTR/L

## 2025-03-06 NOTE — CONSULTS
Consultation - Nephrology   Name: Trevor Waldrop 60 y.o. male I MRN: [id number]  Unit/Bed#: -01 I Date of Admission: 3/5/2025   Date of Service: 3/6/2025 I Hospital Day: 1   Inpatient consult to Nephrology  Consult performed by: Rich Elaine MD  Consult ordered by: NANI Roche        Physician Requesting Evaluation: Irene Powers MD   Reason for Evaluation / Principal Problem: Acute kidney injury    Assessment & Plan  JOHNY (acute kidney injury) (HCC)  Etiology: Volume depletion/shock/sepsis leading to ATN/obstructive uropathy as well please see below  Baseline creatinine 1.26 that was from November 2022 so most likely has element of chronic kidney disease  Creatinine on admission 6.38 from 3/5/2025 which is the peak  Current creatinine improved to 3.03 with good urine output  UA: Large occult blood/large leukocytes/2+ proteinuria/30-50 RBCs/30-50 WBCs/innumerable bacteria  Imaging: CT scan demonstrating severe bilateral hydroureteronephrosis extending to UVJ's severe right renal cortical thinning no loculated perinephric collection    Recommend:  - Anderson catheter placed  - Replete hypokalemia  - Monitor phosphorus slightly elevated  - Would reimage in a.m. to make sure obstruction has improved with renal ultrasound bedside  - Pressor support/midodrine for blood pressure support  - IV fluids with bicarbonate infusion for metabolic acidosis  - Avoid nephrotoxic agents such as NSAIDs as well as avoiding hypotension    Urine output excellent over 3 L  No indication for renal replacement therapy/CRRT for the moment as long as we can control the metabolic acidosis other electrolytes are acceptable; actually potassium level  Metabolic acidosis  Metabolic acidosis positive anion gap most compatible with acute kidney injury given normal lactic acid  Bicarbonate infusion  Bicarbonate improving to 15 monitor closely  Septic shock (HCC)  Secondary to sepsis: Check echo/cortisol/TSH rule out other  causes  Transitioning to midodrine 10 mg 3 times a day off of pressor support  Continue IV fluids  Influenza A  On treatment with Tamiflu  Left lower lobe pneumonia  On broad-spectrum antibiotics per critical care medicine  Acute cystitis with hematuria  On antibiotics awaiting cultures per critical care medicine      I have reviewed the nephrology recommendations including management of acute kidney injury metabolic acidosis as outlined above, with critical care medicine, and we are in agreement with renal plan including the information outlined above.     History of Present Illness   Trevor Waldrop is a 60 y.o. male with PMH of EtOH abuse/anemia/GERD/PAD status post AKA on the right and left BKA/viral hepatitis/seizure disorder//esophageal ulcer who was admitted to [facility], [place] from nursing facility after presenting with fevers tachypnea temp of 101.5 positive influenza A left lower lobe consolidation on chest x-ray positive UTI and hypotensive despite IV fluids placed on pressor support with broad-spectrum antibiotics and treatment for influenza A. A renal consultation is requested today for assistance in the management of acute kidney injury and metabolic acidosis.    Review of Systems: Not able to obtain patient noncommunicative  Historical Information   Past Medical History:   Diagnosis Date    Alcohol abuse     Anemia 6/14/2019    On iron.  Unclear if ever documented iron deficient    GERD (gastroesophageal reflux disease)     GERD with esophagitis 6/14/2019    EGD in [facility] 2019    Hx of AKA (above knee amputation) (HCC)     Right    Infectious viral hepatitis     Seizures (HCC)     Seizures (HCC)     Ulcer of esophagus      Past Surgical History:   Procedure Laterality Date    BELOW KNEE LEG AMPUTATION      Left    EGD  04/25/2017    LUNG DECORTICATION Left      Social History     Tobacco Use    Smoking status: Former     Passive exposure: Past    Smokeless tobacco: Never   Substance and Sexual Activity     Alcohol use: Never    Drug use: Never    Sexual activity: Not on file     E-Cigarette/Vaping     E-Cigarette/Vaping Substances     Family history non-contributory  Social History     Tobacco Use    Smoking status: Former     Passive exposure: Past    Smokeless tobacco: Never   Substance and Sexual Activity    Alcohol use: Never    Drug use: Never    Sexual activity: Not on file       Current Facility-Administered Medications:     acetaminophen (TYLENOL) tablet 650 mg, Q4H PRN    aspirin chewable tablet 81 mg, Daily    calcium carbonate (TUMS) chewable tablet 1,000 mg, BID PRN    cefepime (MAXIPIME) IVPB (premix in dextrose) 1,000 mg 50 mL, Q12H    chlorhexidine (PERIDEX) 0.12 % oral rinse 15 mL, Q12H DANIELLE    Cholecalciferol (VITAMIN D3) tablet 2,000 Units, Daily    [START ON 3/7/2025] ferrous sulfate tablet 325 mg, Daily With Breakfast    heparin (porcine) subcutaneous injection 5,000 Units, Q8H DANIELLE **AND** [CANCELED] Platelet count, Once    ipratropium (ATROVENT) 0.02 % inhalation solution 0.5 mg, TID    lamoTRIgine (LaMICtal) tablet 200 mg, BID    levalbuterol (XOPENEX) inhalation solution 1.25 mg, TID    levETIRAcetam (KEPPRA) tablet 500 mg, Q24H    melatonin tablet 6 mg, HS    midodrine (PROAMATINE) tablet 10 mg, TID AC    mirtazapine (REMERON) tablet 7.5 mg, HS    multivitamin stress formula tablet 1 tablet, Daily    oseltamivir (TAMIFLU) capsule 30 mg, Q24H    pantoprazole (PROTONIX) EC tablet 20 mg, Early Morning    senna-docusate sodium (SENOKOT S) 8.6-50 mg per tablet 2 tablet, Daily    sodium bicarbonate 150 mEq in dextrose 5 % 1,000 mL infusion, Continuous, Last Rate: 125 mL/hr (03/06/25 1030)    tamsulosin (FLOMAX) capsule 0.4 mg, Daily With Dinner    thiamine tablet 100 mg, Daily    vancomycin (VANCOCIN) IVPB (premix in dextrose) 500 mg 100 mL, Daily PRN    zinc sulfate (ZINCATE) capsule 220 mg, Daily  Prior to Admission Medications   Prescriptions Last Dose Informant Patient Reported? Taking?   Ascorbic  Acid (vitamin C) 1000 MG tablet Unknown Outside Facility (Specify) Yes No   Sig: Take 1,000 mg by mouth daily   Diclofenac Sodium (VOLTAREN) 1 % Unknown Outside Facility (Specify) Yes No   Melatonin 5 MG CAPS Unknown Outside Facility (Specify) Yes No   Sig: Take 5 mg by mouth   Multiple Vitamin (multivitamin) tablet 3/4/2025 Outside Facility (Specify) Yes Yes   Sig: Take 1 tablet by mouth daily   POTASSIUM CHLORIDE ER PO 3/4/2025 Outside Facility (Specify) Yes Yes   Sig: Take 10 mEq by mouth   Sodium Phosphates (ENEMA DISPOSABLE RE) Unknown Outside Facility (Specify) Yes No   Sig: Insert into the rectum Every 72 hours as needed     Zinc 50 MG TABS 3/4/2025 Outside Facility (Specify) Yes Yes   Sig: Take 50 mg by mouth   acetaminophen (TYLENOL) 325 mg tablet Unknown Outside Facility (Specify) Yes No   Sig: Take 1,000 mg by mouth 2 (two) times a day Chronic back pain   aluminum-magnesium hydroxide-simethicone (MYLANTA) 200-200-20 mg/5 mL suspension Unknown Outside Facility (Specify) Yes No   Sig: Take 30 mL by mouth every 4 (four) hours as needed for indigestion or heartburn   aspirin 81 mg chewable tablet 3/4/2025 Outside Facility (Specify) Yes Yes   Sig: Chew 81 mg daily   bisacodyl (DULCOLAX) 10 mg suppository Unknown Outside Facility (Specify) Yes No   Sig: Insert 10 mg into the rectum daily   calcium carbonate (TUMS) 500 mg chewable tablet Unknown Outside Facility (Specify) Yes No   Sig: Chew 2 tablets every 4 (four) hours as needed for heartburn   cholecalciferol (VITAMIN D3) 1,000 units tablet 3/4/2025 Outside Facility (Specify) Yes Yes   Sig: Take 2,000 Units by mouth daily   ciprofloxacin (CIPRO) 500 mg tablet Not Taking Outside Facility (Specify) Yes No   Patient not taking: Reported on 9/22/2023   docusate sodium (COLACE) 100 mg capsule Unknown Outside Facility (Specify) No No   Sig: Take 1 capsule (100 mg total) by mouth 2 (two) times a day   famotidine (PEPCID) 20 mg tablet 3/4/2025 Outside Facility  (Specify) No Yes   Sig: Take 1 tablet (20 mg total) by mouth daily   ferrous sulfate 325 (65 Fe) mg tablet 3/4/2025 Outside Facility (Specify) Yes Yes   Sig: Take 325 mg by mouth daily with breakfast   furosemide (LASIX) 20 mg tablet Unknown Outside Facility (Specify) Yes No   Sig: Take 10 mg by mouth daily   Patient not taking: Reported on 2023   lamoTRIgine (LaMICtal) 200 MG tablet 3/4/2025 Outside Facility (Specify) Yes Yes   Sig: Take 200 mg by mouth 2 (two) times a day    levETIRAcetam (KEPPRA) 1000 MG tablet 3/4/2025  No Yes   Sig: Take 1 tablet (1,000 mg total) by mouth 2 (two) times a day   magnesium hydroxide (MILK OF MAGNESIA) 400 mg/5 mL oral suspension Unknown Outside Facility (Specify) Yes No   Sig: Take by mouth daily as needed for constipation   midodrine (PROAMATINE) 5 mg tablet Unknown  Yes No   Sig: Take 5 mg by mouth every 12 (twelve) hours Prn for hypotension   mirtazapine (REMERON) 7.5 MG tablet 3/4/2025 Outside Facility (Specify) Yes Yes   Sig: Take 15 mg by mouth daily at bedtime    ondansetron (ZOFRAN) 4 mg tablet Unknown Outside Facility (Specify) Yes No   Si mg 2 (two) times a day PRN prior to sodium tablets   senna-docusate sodium (SENOKOT-S) 8.6-50 mg per tablet 3/4/2025 Outside Facility (Specify) Yes Yes   Sig: Take 2 tablets by mouth daily   sodium chloride 1 g tablet 3/4/2025 Outside Facility (Specify) No Yes   Sig: Take 3 tablets (3 g total) by mouth 3 (three) times a day with meals   Patient taking differently: Take 2 g by mouth 2 (two) times a day Must crush   tamsulosin (FLOMAX) 0.4 mg 3/4/2025 Outside Facility (Specify) No Yes   Sig: Take 1 capsule (0.4 mg total) by mouth daily with dinner   thiamine 100 MG tablet 3/4/2025 Outside Facility (Specify) Yes Yes   Sig: Take 100 mg by mouth daily      Facility-Administered Medications: None     Patient has no known allergies.    Objective :  Temp:  [98.8 °F (37.1 °C)-101.1 °F (38.4 °C)] 99.7 °F (37.6 °C)  HR:  [] 98  BP:  ()/(46-77) 96/52  Resp:  [17-36] 36  SpO2:  [87 %-100 %] 98 %  O2 Device: None (Room air)    Current Weight: Weight - Scale: 49.1 kg (108 lb 3.9 oz)  First Weight: Weight - Scale: 50 kg (110 lb 3.7 oz)  I/O         03/04 0701  03/05 0700 03/05 0701  03/06 0700 03/06 0701  03/07 0700    P.O.  240 660    I.V. (mL/kg)  1099.6 (22.4) 1287.9 (26.2)    IV Piggyback  3000 100    Total Intake(mL/kg)  4339.6 (88.4) 2047.9 (41.7)    Urine (mL/kg/hr)  3400 (2.9) 1600 (3.5)    Stool  0     Total Output  3400 1600    Net  +939.6 +447.9           Unmeasured Stool Occurrence  0 x           Physical Exam  General: Well-developed well-nourished, weak appearing noncommunicative  Skin:  No acute rash  Eyes: No scleral icterus and noninjected  ENT: Normocephalic/atraumatic/mildly dry mucous membranes  Neck:  Supple, no jugular venous distention, trachea midline, overall appearance is normal; no  carotid bruits, 1+ carotid upstroke  Back: No CVA tenderness   Chest:  Clear to auscultation, poorly cooperative no use of accessory respiratory muscles  CVS:  Regular rate and rhythm, without a rub or gallops or murmurs, normal S3 and S4  Abdomen:  Normal bowel sounds, soft and nontender and nondistended; no overt hepatosplenomegaly or bruits appreciable  Extremities:  No clubbing, cyanosis or edema; status post right AKA and left BKA; no femoral bruits and no significant arthritic changes of his upper extremities  Neuro: Seems to move all extremities but not cooperative with exam  Psych:  Alert but noncommunicative  Medications:    Current Facility-Administered Medications:     acetaminophen (TYLENOL) tablet 650 mg, 650 mg, Oral, Q4H PRN, NANI Roche, 650 mg at 03/06/25 1149    aspirin chewable tablet 81 mg, 81 mg, Oral, Daily, NANI Roche, 81 mg at 03/06/25 1418    calcium carbonate (TUMS) chewable tablet 1,000 mg, 1,000 mg, Oral, BID PRN, NANI Roche    cefepime (MAXIPIME) IVPB (premix in dextrose)  1,000 mg 50 mL, 1,000 mg, Intravenous, Q12H, NANI Roche    chlorhexidine (PERIDEX) 0.12 % oral rinse 15 mL, 15 mL, Mouth/Throat, Q12H DANIELLE, NANI Roche, 15 mL at 03/06/25 0821    Cholecalciferol (VITAMIN D3) tablet 2,000 Units, 2,000 Units, Oral, Daily, NANI Roche, 2,000 Units at 03/06/25 1418    [START ON 3/7/2025] ferrous sulfate tablet 325 mg, 325 mg, Oral, Daily With Breakfast, NANI Roche    heparin (porcine) subcutaneous injection 5,000 Units, 5,000 Units, Subcutaneous, Q8H DANIELLE, 5,000 Units at 03/06/25 1418 **AND** [CANCELED] Platelet count, , , Once, NANI Roche    ipratropium (ATROVENT) 0.02 % inhalation solution 0.5 mg, 0.5 mg, Nebulization, TID, NANI Roche, 0.5 mg at 03/06/25 1342    lamoTRIgine (LaMICtal) tablet 200 mg, 200 mg, Oral, BID, NANI Roche    levalbuterol (XOPENEX) inhalation solution 1.25 mg, 1.25 mg, Nebulization, TID, NANI Roche, 1.25 mg at 03/06/25 1342    levETIRAcetam (KEPPRA) tablet 500 mg, 500 mg, Oral, Q24H, NANI Roche, 500 mg at 03/06/25 1028    melatonin tablet 6 mg, 6 mg, Oral, HS, NANI Roche, 6 mg at 03/05/25 2131    midodrine (PROAMATINE) tablet 10 mg, 10 mg, Oral, TID AC, NANI Roche, 10 mg at 03/06/25 1110    mirtazapine (REMERON) tablet 7.5 mg, 7.5 mg, Oral, HS, NANI Roche, 7.5 mg at 03/05/25 2131    multivitamin stress formula tablet 1 tablet, 1 tablet, Oral, Daily, NANI Roche, 1 tablet at 03/06/25 1418    oseltamivir (TAMIFLU) capsule 30 mg, 30 mg, Oral, Q24H, NANI Roche, 30 mg at 03/06/25 0855    pantoprazole (PROTONIX) EC tablet 20 mg, 20 mg, Oral, Early Morning, NANI Roche, 20 mg at 03/06/25 0532    senna-docusate sodium (SENOKOT S) 8.6-50 mg per tablet 2 tablet, 2 tablet, Oral, Daily, NANI Roche, 2 tablet at 03/06/25 1418    sodium bicarbonate 150 mEq in dextrose 5 % 1,000  "mL infusion, 125 mL/hr, Intravenous, Continuous, NANI Roche, Last Rate: 125 mL/hr at 03/06/25 1030, 125 mL/hr at 03/06/25 1030    tamsulosin (FLOMAX) capsule 0.4 mg, 0.4 mg, Oral, Daily With Dinner, NANI Roche    thiamine tablet 100 mg, 100 mg, Oral, Daily, NANI Roche, 100 mg at 03/06/25 1418    vancomycin (VANCOCIN) IVPB (premix in dextrose) 500 mg 100 mL, 500 mg, Intravenous, Daily PRN, NANI Roche    zinc sulfate (ZINCATE) capsule 220 mg, 220 mg, Oral, Daily, NANI Roche, 220 mg at 03/06/25 1418      Lab Results: I have reviewed the following results:  Results from last 7 days   Lab Units 03/06/25  1223 03/06/25  0516 03/05/25  0444   WBC Thousand/uL  --  6.39 8.47   HEMOGLOBIN g/dL  --  7.7* 10.2*   HEMATOCRIT %  --  23.8* 30.5*   PLATELETS Thousands/uL  --  161 206   POTASSIUM mmol/L 3.2* 3.7 5.1   CHLORIDE mmol/L 110* 114* 107   CO2 mmol/L 15* 13* 12*   BUN mg/dL 73* 86* 109*   CREATININE mg/dL 3.03* 3.68* 6.38*   CALCIUM mg/dL 8.3* 8.3* 9.0   MAGNESIUM mg/dL  --  2.3 2.6   PHOSPHORUS mg/dL  --  4.2* 4.0   ALBUMIN g/dL  --  2.9* 3.3*       Administrative Statements     Portions of the record may have been created with voice recognition software. Occasional wrong word or \"sound a like\" substitutions may have occurred due to the inherent limitations of voice recognition software. Read the chart carefully and recognize, using context, where substitutions have occurred.If you have any questions, please contact the dictating provider.  "

## 2025-03-07 ENCOUNTER — APPOINTMENT (INPATIENT)
Dept: ULTRASOUND IMAGING | Facility: HOSPITAL | Age: 60
DRG: 720 | End: 2025-03-07
Payer: COMMERCIAL

## 2025-03-07 ENCOUNTER — TELEPHONE (OUTPATIENT)
Dept: OTHER | Facility: HOSPITAL | Age: 60
End: 2025-03-07

## 2025-03-07 ENCOUNTER — APPOINTMENT (INPATIENT)
Dept: NON INVASIVE DIAGNOSTICS | Facility: HOSPITAL | Age: 60
DRG: 720 | End: 2025-03-07
Payer: COMMERCIAL

## 2025-03-07 PROBLEM — N13.39 OTHER HYDRONEPHROSIS: Status: ACTIVE | Noted: 2025-03-07

## 2025-03-07 PROBLEM — E87.8 ELECTROLYTE ABNORMALITY: Status: ACTIVE | Noted: 2025-03-07

## 2025-03-07 LAB
ANION GAP SERPL CALCULATED.3IONS-SCNC: 13 MMOL/L (ref 4–13)
AORTIC ROOT: 3.4 CM
BACTERIA UR CULT: ABNORMAL
BSA FOR ECHO PROCEDURE: 1.27 M2
BUN SERPL-MCNC: 50 MG/DL (ref 5–25)
CALCIUM SERPL-MCNC: 8.2 MG/DL (ref 8.4–10.2)
CHLORIDE SERPL-SCNC: 105 MMOL/L (ref 96–108)
CO2 SERPL-SCNC: 26 MMOL/L (ref 21–32)
CORTIS SERPL-MCNC: 26.9 UG/DL
CREAT SERPL-MCNC: 2.22 MG/DL (ref 0.6–1.3)
DOP CALC LVOT AREA: 3.14
DOP CALC LVOT DIAMETER: 2 CM
E WAVE DECELERATION TIME: 134 MS
E/A RATIO: 1.05
ERYTHROCYTE [DISTWIDTH] IN BLOOD BY AUTOMATED COUNT: 12.4 % (ref 11.6–15.1)
FRACTIONAL SHORTENING: 26 (ref 28–44)
GFR SERPL CREATININE-BSD FRML MDRD: 31 ML/MIN/1.73SQ M
GLUCOSE SERPL-MCNC: 128 MG/DL (ref 65–140)
HCT VFR BLD AUTO: 24 % (ref 36.5–49.3)
HGB BLD-MCNC: 8 G/DL (ref 12–17)
INTERVENTRICULAR SEPTUM IN DIASTOLE (PARASTERNAL SHORT AXIS VIEW): 0.7 CM
INTERVENTRICULAR SEPTUM: 0.7 CM (ref 0.6–1.1)
LA/AORTA RATIO 2D: 0.79
LAAS-AP2: 8 CM2
LAAS-AP4: 11.8 CM2
LEFT ATRIUM SIZE: 2.7 CM
LEFT ATRIUM VOLUME (MOD BIPLANE): 19 ML
LEFT ATRIUM VOLUME INDEX (MOD BIPLANE): 15 ML/M2
LEFT INTERNAL DIMENSION IN SYSTOLE: 3.4 CM (ref 2.1–4)
LEFT VENTRICULAR INTERNAL DIMENSION IN DIASTOLE: 4.6 CM (ref 3.5–6)
LEFT VENTRICULAR POSTERIOR WALL IN END DIASTOLE: 0.5 CM
LEFT VENTRICULAR STROKE VOLUME: 51 ML
LV EF US.2D.A4C+ESTIMATED: 56 %
LVSV (TEICH): 51 ML
MAGNESIUM SERPL-MCNC: 1.8 MG/DL (ref 1.9–2.7)
MCH RBC QN AUTO: 31 PG (ref 26.8–34.3)
MCHC RBC AUTO-ENTMCNC: 33.3 G/DL (ref 31.4–37.4)
MCV RBC AUTO: 93 FL (ref 82–98)
MV E'TISSUE VEL-SEP: 11 CM/S
MV PEAK A VEL: 0.81 M/S
MV PEAK E VEL: 85 CM/S
MV STENOSIS PRESSURE HALF TIME: 39 MS
MV VALVE AREA P 1/2 METHOD: 5.64
PHOSPHATE SERPL-MCNC: 1.5 MG/DL (ref 2.3–4.1)
PLATELET # BLD AUTO: 212 THOUSANDS/UL (ref 149–390)
PMV BLD AUTO: 9.7 FL (ref 8.9–12.7)
POTASSIUM SERPL-SCNC: 3.1 MMOL/L (ref 3.5–5.3)
RBC # BLD AUTO: 2.58 MILLION/UL (ref 3.88–5.62)
RIGHT VENTRICLE ID DIMENSION: 3.3 CM
SL CV LV EF: 55
SL CV PED ECHO LEFT VENTRICLE DIASTOLIC VOLUME (MOD BIPLANE) 2D: 98 ML
SL CV PED ECHO LEFT VENTRICLE SYSTOLIC VOLUME (MOD BIPLANE) 2D: 47 ML
SODIUM SERPL-SCNC: 144 MMOL/L (ref 135–147)
T4 FREE SERPL-MCNC: 1.32 NG/DL (ref 0.61–1.12)
TSH SERPL DL<=0.05 MIU/L-ACNC: 0.14 UIU/ML (ref 0.45–4.5)
VANCOMYCIN SERPL-MCNC: 13.4 UG/ML (ref 10–20)
WBC # BLD AUTO: 7.79 THOUSAND/UL (ref 4.31–10.16)

## 2025-03-07 PROCEDURE — 93306 TTE W/DOPPLER COMPLETE: CPT | Performed by: INTERNAL MEDICINE

## 2025-03-07 PROCEDURE — 84100 ASSAY OF PHOSPHORUS: CPT | Performed by: NURSE PRACTITIONER

## 2025-03-07 PROCEDURE — 82533 TOTAL CORTISOL: CPT | Performed by: INTERNAL MEDICINE

## 2025-03-07 PROCEDURE — 99223 1ST HOSP IP/OBS HIGH 75: CPT | Performed by: UROLOGY

## 2025-03-07 PROCEDURE — 80202 ASSAY OF VANCOMYCIN: CPT | Performed by: INTERNAL MEDICINE

## 2025-03-07 PROCEDURE — 84443 ASSAY THYROID STIM HORMONE: CPT | Performed by: INTERNAL MEDICINE

## 2025-03-07 PROCEDURE — 84439 ASSAY OF FREE THYROXINE: CPT | Performed by: INTERNAL MEDICINE

## 2025-03-07 PROCEDURE — 80048 BASIC METABOLIC PNL TOTAL CA: CPT | Performed by: INTERNAL MEDICINE

## 2025-03-07 PROCEDURE — 93306 TTE W/DOPPLER COMPLETE: CPT

## 2025-03-07 PROCEDURE — 85027 COMPLETE CBC AUTOMATED: CPT | Performed by: INTERNAL MEDICINE

## 2025-03-07 PROCEDURE — 83735 ASSAY OF MAGNESIUM: CPT | Performed by: NURSE PRACTITIONER

## 2025-03-07 PROCEDURE — 99232 SBSQ HOSP IP/OBS MODERATE 35: CPT | Performed by: INTERNAL MEDICINE

## 2025-03-07 PROCEDURE — 76775 US EXAM ABDO BACK WALL LIM: CPT

## 2025-03-07 RX ORDER — MAGNESIUM SULFATE 1 G/100ML
1 INJECTION INTRAVENOUS ONCE
Status: COMPLETED | OUTPATIENT
Start: 2025-03-07 | End: 2025-03-07

## 2025-03-07 RX ORDER — LEVETIRACETAM 500 MG/5ML
500 INJECTION, SOLUTION, CONCENTRATE INTRAVENOUS EVERY 12 HOURS SCHEDULED
Status: DISCONTINUED | OUTPATIENT
Start: 2025-03-07 | End: 2025-03-14 | Stop reason: HOSPADM

## 2025-03-07 RX ORDER — ACETAMINOPHEN 10 MG/ML
1000 INJECTION, SOLUTION INTRAVENOUS EVERY 8 HOURS PRN
Status: DISPENSED | OUTPATIENT
Start: 2025-03-07 | End: 2025-03-09

## 2025-03-07 RX ORDER — CEFTRIAXONE 1 G/50ML
1000 INJECTION, SOLUTION INTRAVENOUS EVERY 24 HOURS
Status: DISCONTINUED | OUTPATIENT
Start: 2025-03-07 | End: 2025-03-14 | Stop reason: HOSPADM

## 2025-03-07 RX ORDER — POTASSIUM CHLORIDE 1500 MG/1
40 TABLET, EXTENDED RELEASE ORAL ONCE
Status: COMPLETED | OUTPATIENT
Start: 2025-03-07 | End: 2025-03-07

## 2025-03-07 RX ORDER — VANCOMYCIN HYDROCHLORIDE 500 MG/100ML
500 INJECTION, SOLUTION INTRAVENOUS ONCE
Status: DISCONTINUED | OUTPATIENT
Start: 2025-03-07 | End: 2025-03-07

## 2025-03-07 RX ORDER — POTASSIUM CHLORIDE 14.9 MG/ML
20 INJECTION INTRAVENOUS ONCE
Status: COMPLETED | OUTPATIENT
Start: 2025-03-07 | End: 2025-03-07

## 2025-03-07 RX ORDER — SODIUM CHLORIDE, SODIUM GLUCONATE, SODIUM ACETATE, POTASSIUM CHLORIDE, MAGNESIUM CHLORIDE, SODIUM PHOSPHATE, DIBASIC, AND POTASSIUM PHOSPHATE .53; .5; .37; .037; .03; .012; .00082 G/100ML; G/100ML; G/100ML; G/100ML; G/100ML; G/100ML; G/100ML
100 INJECTION, SOLUTION INTRAVENOUS CONTINUOUS
Status: DISCONTINUED | OUTPATIENT
Start: 2025-03-07 | End: 2025-03-08

## 2025-03-07 RX ADMIN — POTASSIUM CHLORIDE 20 MEQ: 200 INJECTION, SOLUTION INTRAVENOUS at 10:42

## 2025-03-07 RX ADMIN — CEFTRIAXONE 1000 MG: 1 INJECTION, SOLUTION INTRAVENOUS at 20:37

## 2025-03-07 RX ADMIN — MIRTAZAPINE 7.5 MG: 15 TABLET, FILM COATED ORAL at 22:33

## 2025-03-07 RX ADMIN — POTASSIUM CHLORIDE 20 MEQ: 14.9 INJECTION, SOLUTION INTRAVENOUS at 18:01

## 2025-03-07 RX ADMIN — MAGNESIUM SULFATE IN DEXTROSE 1 G: 10 INJECTION, SOLUTION INTRAVENOUS at 09:18

## 2025-03-07 RX ADMIN — SODIUM CHLORIDE 1000 ML: 0.9 INJECTION, SOLUTION INTRAVENOUS at 16:04

## 2025-03-07 RX ADMIN — SODIUM CHLORIDE, SODIUM ACETATE ANHYDROUS, SODIUM GLUCONATE, POTASSIUM CHLORIDE, AND MAGNESIUM CHLORIDE 100 ML/HR: 526; 222; 502; 37; 30 INJECTION, SOLUTION INTRAVENOUS at 11:25

## 2025-03-07 RX ADMIN — Medication 6 MG: at 22:33

## 2025-03-07 RX ADMIN — HEPARIN SODIUM 5000 UNITS: 5000 INJECTION INTRAVENOUS; SUBCUTANEOUS at 05:26

## 2025-03-07 RX ADMIN — CEFEPIME HYDROCHLORIDE 1000 MG: 1 INJECTION, SOLUTION INTRAVENOUS at 09:18

## 2025-03-07 RX ADMIN — SODIUM BICARBONATE 125 ML/HR: 84 INJECTION, SOLUTION INTRAVENOUS at 05:24

## 2025-03-07 RX ADMIN — HEPARIN SODIUM 5000 UNITS: 5000 INJECTION INTRAVENOUS; SUBCUTANEOUS at 22:33

## 2025-03-07 RX ADMIN — POTASSIUM PHOSPHATE, MONOBASIC POTASSIUM PHOSPHATE, DIBASIC INJECTION, 30 MMOL: 236; 224 SOLUTION, CONCENTRATE INTRAVENOUS at 11:35

## 2025-03-07 RX ADMIN — POTASSIUM CHLORIDE 40 MEQ: 1500 TABLET, EXTENDED RELEASE ORAL at 16:50

## 2025-03-07 RX ADMIN — MIDODRINE HYDROCHLORIDE 10 MG: 5 TABLET ORAL at 15:44

## 2025-03-07 RX ADMIN — HEPARIN SODIUM 5000 UNITS: 5000 INJECTION INTRAVENOUS; SUBCUTANEOUS at 13:42

## 2025-03-07 RX ADMIN — LEVETIRACETAM 500 MG: 500 INJECTION, SOLUTION INTRAVENOUS at 20:41

## 2025-03-07 RX ADMIN — LEVETIRACETAM 500 MG: 500 INJECTION, SOLUTION INTRAVENOUS at 12:21

## 2025-03-07 RX ADMIN — TAMSULOSIN HYDROCHLORIDE 0.4 MG: 0.4 CAPSULE ORAL at 15:43

## 2025-03-07 RX ADMIN — LAMOTRIGINE 200 MG: 100 TABLET ORAL at 16:04

## 2025-03-07 RX ADMIN — ACETAMINOPHEN 1000 MG: 10 INJECTION INTRAVENOUS at 22:34

## 2025-03-07 RX ADMIN — ACETAMINOPHEN 1000 MG: 10 INJECTION INTRAVENOUS at 12:21

## 2025-03-07 NOTE — PLAN OF CARE
Problem: Potential for Falls  Goal: Patient will remain free of falls  Description: INTERVENTIONS:  - Educate patient/family on patient safety including physical limitations  - Instruct patient to call for assistance with activity   - Consult OT/PT to assist with strengthening/mobility   - Keep Call bell within reach  - Keep bed low and locked with side rails adjusted as appropriate  - Keep care items and personal belongings within reach  - Initiate and maintain comfort rounds  - Make Fall Risk Sign visible to staff  - Offer Toileting every 2 Hours, in advance of need  - Initiate/Maintain bed/chair alarm  - Obtain necessary fall risk management equipment: yellow bracelet, yellow socks, yellow fall risk flag displayed  - Apply yellow socks and bracelet for high fall risk patients  - Consider moving patient to room near nurses station  Outcome: Progressing     Problem: Prexisting or High Potential for Compromised Skin Integrity  Goal: Skin integrity is maintained or improved  Description: INTERVENTIONS:  - Identify patients at risk for skin breakdown  - Assess and monitor skin integrity  - Assess and monitor nutrition and hydration status  - Monitor labs   - Assess for incontinence   - Turn and reposition patient  - Assist with mobility/ambulation  - Relieve pressure over bony prominences  - Avoid friction and shearing  - Provide appropriate hygiene as needed including keeping skin clean and dry  - Evaluate need for skin moisturizer/barrier cream  - Collaborate with interdisciplinary team   - Patient/family teaching  - Consider wound care consult   Outcome: Progressing     Problem: PAIN - ADULT  Goal: Verbalizes/displays adequate comfort level or baseline comfort level  Description: Interventions:  - Encourage patient to monitor pain and request assistance  - Assess pain using appropriate pain scale  - Administer analgesics based on type and severity of pain and evaluate response  - Implement non-pharmacological  measures as appropriate and evaluate response  - Consider cultural and social influences on pain and pain management  - Notify physician/advanced practitioner if interventions unsuccessful or patient reports new pain  Outcome: Progressing     Problem: INFECTION - ADULT  Goal: Absence or prevention of progression during hospitalization  Description: INTERVENTIONS:  - Assess and monitor for signs and symptoms of infection  - Monitor lab/diagnostic results  - Monitor all insertion sites, i.e. indwelling lines, tubes, and drains  - Monitor endotracheal if appropriate and nasal secretions for changes in amount and color  - [place] appropriate cooling/warming therapies per order  - Administer medications as ordered  - Instruct and encourage patient and family to use good hand hygiene technique  - Identify and instruct in appropriate isolation precautions for identified infection/condition  Outcome: Progressing  Goal: Absence of fever/infection during neutropenic period  Description: INTERVENTIONS:  - Monitor WBC    Outcome: Progressing     Problem: SAFETY ADULT  Goal: Patient will remain free of falls  Description: INTERVENTIONS:  - Educate patient/family on patient safety including physical limitations  - Instruct patient to call for assistance with activity   - Consult OT/PT to assist with strengthening/mobility   - Keep Call bell within reach  - Keep bed low and locked with side rails adjusted as appropriate  - Keep care items and personal belongings within reach  - Initiate and maintain comfort rounds  - Make Fall Risk Sign visible to staff  - Offer Toileting every 2 Hours, in advance of need  - Initiate/Maintain bed/chair alarm  - Obtain necessary fall risk management equipment: yellow bracelet, yellow socks, yellow fall risk flag displayed  - Apply yellow socks and bracelet for high fall risk patients  - Consider moving patient to room near nurses station  Outcome: Progressing  Goal: Maintain or return to baseline  ADL function  Description: INTERVENTIONS:  -  Assess patient's ability to carry out ADLs; assess patient's baseline for ADL function and identify physical deficits which impact ability to perform ADLs (bathing, care of mouth/teeth, toileting, grooming, dressing, etc.)  - Assess/evaluate cause of self-care deficits   - Assess range of motion  - Assess patient's mobility; develop plan if impaired  - Assess patient's need for assistive devices and provide as appropriate  - Encourage maximum independence but intervene and supervise when necessary  - Involve family in performance of ADLs  - Assess for home care needs following discharge   - Consider OT consult to assist with ADL evaluation and planning for discharge  - Provide patient education as appropriate  Outcome: Progressing  Goal: Maintains/Returns to pre admission functional level  Description: INTERVENTIONS:  - Perform AM-PAC 6 Click Basic Mobility/ Daily Activity assessment daily.  - Set and communicate daily mobility goal to care team and patient/family/caregiver.   - Collaborate with rehabilitation services on mobility goals if consulted  - Perform Range of Motion 2 times a day.  - Reposition patient every 2 hours.  - Dangle patient 2 times a day  - Stand patient 2 times a day  - Ambulate patient 2 times a day  - Out of bed to chair 2 times a day   - Out of bed for meals 2 times a day  - Out of bed for toileting  - Record patient progress and toleration of activity level   Outcome: Progressing     Problem: DISCHARGE PLANNING  Goal: Discharge to home or other facility with appropriate resources  Description: INTERVENTIONS:  - Identify barriers to discharge w/patient and caregiver  - Arrange for needed discharge resources and transportation as appropriate  - Identify discharge learning needs (meds, wound care, etc.)  - Arrange for interpretive services to assist at discharge as needed  - Refer to Case Management Department for coordinating discharge planning if  the patient needs post-hospital services based on physician/advanced practitioner order or complex needs related to functional status, cognitive ability, or social support system  Outcome: Progressing     Problem: Knowledge Deficit  Goal: Patient/family/caregiver demonstrates understanding of disease process, treatment plan, medications, and discharge instructions  Description: Complete learning assessment and assess knowledge base.  Interventions:  - Provide teaching at level of understanding  - Provide teaching via preferred learning methods  Outcome: Progressing     Problem: Nutrition/Hydration-ADULT  Goal: Nutrient/Hydration intake appropriate for improving, restoring or maintaining nutritional needs  Description: Monitor and assess patient's nutrition/hydration status for malnutrition. Collaborate with interdisciplinary team and initiate plan and interventions as ordered.  Monitor patient's weight and dietary intake as ordered or per policy. Utilize nutrition screening tool and intervene as necessary. Determine patient's food preferences and provide high-protein, high-caloric foods as appropriate.     INTERVENTIONS:  - Monitor oral intake, urinary output, labs, and treatment plans  - Assess nutrition and hydration status and recommend course of action  - Evaluate amount of meals eaten  - Assist patient with eating if necessary   - Allow adequate time for meals  - Recommend/ encourage appropriate diets, oral nutritional supplements, and vitamin/mineral supplements  - Order, calculate, and assess calorie counts as needed  - Recommend, monitor, and adjust tube feedings and TPN/PPN based on assessed needs  - Assess need for intravenous fluids  - Provide specific nutrition/hydration education as appropriate  - Include patient/family/caregiver in decisions related to nutrition  Outcome: Progressing

## 2025-03-07 NOTE — ASSESSMENT & PLAN NOTE
Resolved with bicarb drip  Bicarb now 25  Will discontinue bicarb drip given hypokalemia and start Isolyte

## 2025-03-07 NOTE — ASSESSMENT & PLAN NOTE
Possibly due to postobstructive diuresis  Discontinue bicarb drip and start isolyte as above  K 3.1 --status post KCl 20 mEq IV x 1  Will give additional 20 mEq IV  Phosphorus 1.5 --received Phos-Nak 1 packet x 1  Will give kphos 30 mmol IV as he is refusing po meds per nurse   Magnesium 1.8 --placed by primary team  Repeat a.m. Phos, mag, potassium

## 2025-03-07 NOTE — ASSESSMENT & PLAN NOTE
Malnutrition Findings:   Adult Malnutrition type: Chronic illness  Adult Degree of Malnutrition: Other severe protein calorie malnutrition                     360 Statement: Pt presents with severe protein calorie malnutrition as evidenced by sunken orbitals, b/l temporal depressions and visble clavicle bone. Treat with diet and supplements BID.    BMI Findings:           Body mass index is 25.03 kg/m².

## 2025-03-07 NOTE — ASSESSMENT & PLAN NOTE
Multiple electrolyte abnormalities including hypokalemia hypomagnesemia and hypophosphatemia.  Replacement initiated.

## 2025-03-07 NOTE — PROGRESS NOTES
NEPHROLOGY PROGRESS NOTE   Trevor Waldrop 60 y.o. male MRN: [id number]  Unit/Bed#: -01 Encounter: [id number]      Assessment & Plan  JOHNY (acute kidney injury) (HCC)  Prerenal versus ATN in the setting of poor p.o. intake, sepsis, shock and obstructive uropathy   Prior creatinine 1.26 in November 2022 so most likely has element of chronic kidney disease  Creatinine on admission 6.38 from 3/5/2025 which is the peak  Creatinine currently improving to 2.2   UA: Large occult blood/large leukocytes/2+ proteinuria/30-50 RBCs/30-50 WBCs/innumerable bacteria  Imaging: CT scan demonstrating severe bilateral hydroureteronephrosis extending to UVJ's severe right renal cortical thinning no loculated perinephric collection  Repeat renal ultrasound: Kidneys symmetric and normal size.  Mild to moderate dilatation of left pelvicalyceal system which is improving.  Severe hydronephrosis of the right kidney with cortical thinning likely due to chronic bladder outlet obstruction  Maintain Anderson catheter  Continue IV fluids  Discontinue oral fluid restriction given sodium of 144  Other hydronephrosis  Repeat renal ultrasound showed improvement in left hydronephrosis but persistent severe right hydronephrosis likely related to bladder outlet obstruction  Consider urology evaluation if creatinine does not continue to improve  Metabolic acidosis  Resolved with bicarb drip  Bicarb now 25  Will discontinue bicarb drip given hypokalemia and start Isolyte  Septic shock (HCC)  Likely related to UTI/flu/pneumonia  Urine culture positive for providencia rettgeri   Blood cultures negative x 24 hours  On cefepime and vancomycin per primary team  Continue IV fluids  Continue midodrine 10 mg 3 times daily  TSH low, cortisol pending  Echocardiogram pending  Influenza A  Tamiflu per primary team  Electrolyte abnormality  Possibly due to postobstructive diuresis  Discontinue bicarb drip and start isolyte as above  K 3.1 --status post KCl 20 mEq IV x  1  Will give additional 20 mEq IV  Phosphorus 1.5 --received Phos-Nak 1 packet x 1  Will give kphos 30 mmol IV as he is refusing po meds per nurse   Magnesium 1.8 --placed by primary team  Repeat a.m. Phos, mag, potassium      Plan Summary:   DC bicarb drip and start isolyte  Replace electrolytes  Consider urology evaluation   Repeat a.m. BMP, magnesium, phosphorus    SUBJECTIVE:  Per nurse he is refusing to eat or drink and refusing any p.o. meds.    OBJECTIVE:  Current Weight: Weight - Scale: 45.4 kg (100 lb)  Vitals:    03/07/25 0838   BP: 106/65   Pulse: (!) 112   Resp:    Temp:    SpO2:        Intake/Output Summary (Last 24 hours) at 3/7/2025 1032  Last data filed at 3/7/2025 1028  Gross per 24 hour   Intake 2972.51 ml   Output 3625 ml   Net -652.49 ml       General:  appears comfortable and in no acute distress   Skin:  No rash  Eyes:  Sclerae anicteric, no periorbital edema   ENT:  Moist mucous membranes  Neck:  Trachea midline, symmetric   Chest:  Clear to auscultation bilaterally with no wheezes, rales or rhonchi  CVS:  Regular rate and rhythm  Abdomen:  Soft, nontender, nondistended  Neuro: lethargic  Extremities: no lower extremity edema   : young in place with clear urine output       Medications:  Scheduled Meds:  Current Facility-Administered Medications   Medication Dose Route Frequency Provider Last Rate    acetaminophen  650 mg Oral Q4H PRN NANI Roche      aspirin  81 mg Oral Daily NANI Roche      calcium carbonate  1,000 mg Oral BID PRN NANI Roche      cefepime  1,000 mg Intravenous Q12H NANI Roche 1,000 mg (03/07/25 0918)    chlorhexidine  15 mL Mouth/Throat Q12H [facility] NANI Roche      cholecalciferol  2,000 Units Oral Daily NANI Roche      ferrous sulfate  325 mg Oral Daily With Breakfast NANI Roche      heparin (porcine)  5,000 Units Subcutaneous Q8H [facility] NANI Roche      ipratropium  0.5 mg  Nebulization Q6H PRN Max 3/day Irene Powers MD      lamoTRIgine  200 mg Oral BID NANI Roche      levalbuterol  1.25 mg Nebulization Q6H PRN Irene Powers MD      levETIRAcetam  500 mg Oral Q24H NANI Roche      melatonin  6 mg Oral HS NANI Roche      midodrine  10 mg Oral TID AC NANI Roche      mirtazapine  7.5 mg Oral HS NANI Roche      multivitamin stress formula  1 tablet Oral Daily NANI Roche      oseltamivir  30 mg Oral Q24H NANI Roche      pantoprazole  20 mg Oral Early Morning NANI Roche      potassium chloride  20 mEq Intravenous Once Linda Koroma MD      senildefonso-docusate sodium  2 tablet Oral Daily NANI Roche      sodium bicarbonate 150 mEq in dextrose 5 % 1,000 mL infusion  125 mL/hr Intravenous Continuous NANI Roche 125 mL/hr (03/07/25 0524)    tamsulosin  0.4 mg Oral Daily With Dinner NANI Roche      thiamine  100 mg Oral Daily NANI Roche      vancomycin  500 mg Intravenous Daily PRN NANI Roche      zinc sulfate  220 mg Oral Daily NANI Roche         PRN Meds:.  acetaminophen    calcium carbonate    ipratropium    levalbuterol    vancomycin    Continuous Infusions:sodium bicarbonate 150 mEq in dextrose 5 % 1,000 mL infusion, 125 mL/hr, Last Rate: 125 mL/hr (03/07/25 0524)        Laboratory Results:  Results from last 7 days   Lab Units 03/07/25  0632 03/07/25  0526 03/06/25  1223 03/06/25  0516 03/05/25  0444   WBC Thousand/uL 7.79  --   --  6.39 8.47   HEMOGLOBIN g/dL 8.0*  --   --  7.7* 10.2*   HEMATOCRIT % 24.0*  --   --  23.8* 30.5*   PLATELETS Thousands/uL 212  --   --  161 206   SODIUM mmol/L  --  144 139 141 135   POTASSIUM mmol/L  --  3.1* 3.2* 3.7 5.1   CHLORIDE mmol/L  --  105 110* 114* 107   CO2 mmol/L  --  26 15* 13* 12*   BUN mg/dL  --  50* 73* 86* 109*   CREATININE mg/dL  --  2.22* 3.03* 3.68* 6.38*   CALCIUM mg/dL   --  8.2* 8.3* 8.3* 9.0   MAGNESIUM mg/dL  --  1.8*  --  2.3 2.6   PHOSPHORUS mg/dL  --  1.5*  --  4.2* 4.0

## 2025-03-07 NOTE — PROGRESS NOTES
Progress Note - Hospitalist   Name: Trevor Waldrop 60 y.o. male I MRN: [id number]  Unit/Bed#: -01 I Date of Admission: 3/5/2025   Date of Service: 3/7/2025 I Hospital Day: 2    Assessment & Plan  Septic shock (HCC)  Patient was admitted from nursing facility with fever and hypotension  Tested positive for influenza A and urine suggestive of urinary tract infection  Admission CT with left lower lobe consolidation  Met criteria for septic shock and required vasopressor support transiently.  Started on broad-spectrum antibiotic(Comycin and cefepime) with Tamiflu for acute influenza  Blood cultures remain negative to date.  Urine culture with procidentia  MRSA swab negative.  Vancomycin discontinued  Antibiotic switched to ceftriaxone based on sensitivity result  Blood pressure has remained stable with MAP greater than 65  GERD with esophagitis  History of seizures maintained on Lamictal and Keppra.  Will switch Keppra dose to IV as patient is refusing all medications.  Dose increased per elevated levels noted upon admission.  Bipolar disorder (HCC)  Continue with home medications  Localization-related (focal) (partial) symptomatic epilepsy and epileptic syndromes with complex partial seizures, not intractable, with status epilepticus (HCC)    Influenza A  Tested positive for influenza A on admission.  Currently on Tamiflu day 3 of 5(dose renally adjusted)  Left lower lobe pneumonia  Per imaging upon admission likely postviral pneumonia.  Empirically on ceftriaxone.  Trend procalcitonin.  Acute cystitis with hematuria  Required Anderson catheter placement for chronic bladder outlet obstruction and bilateral severe hydronephrosis.  Urine culture with procidentia.  Currently on cefepime.  Based on sensitivity results will transition to ceftriaxone.  JOHNY (acute kidney injury) ([facility])  Present on admission with a creatinine of 6.36.  Prior creatinine in 2022 were 1.1-1.4.  Likely in the setting of septic shock with ATN.   Anderson catheter was placed in the emergency room.  CT on admission showing severe bilateral hydroureteronephrosis with distended bladder  Anderson catheter was subsequently placed  Repeat renal ultrasound today shows mild to moderate dilatation of left pelvic calyceal system with severe hydronephrosis of the right kidney  Maintain Anderson catheter.  Urology consulted  Initially on bicarb infusion for metabolic acidosis now resolved.  Switch to isolate by nephrology today    Metabolic acidosis  Improved with bicarb drip.  Bicarb drip discontinued.  Switch to isolate per nephrology  Severe protein-calorie malnutrition (HCC)  Malnutrition Findings:   Adult Malnutrition type: Chronic illness  Adult Degree of Malnutrition: Other severe protein calorie malnutrition                     360 Statement: Pt presents with severe protein calorie malnutrition as evidenced by sunken orbitals, b/l temporal depressions and visble clavicle bone. Treat with diet and supplements BID.    BMI Findings:           Body mass index is 25.03 kg/m².   Encourage oral intake.  Nutrition input appreciated  Electrolyte abnormality  Multiple electrolyte abnormalities including hypokalemia hypomagnesemia and hypophosphatemia.  Replacement initiated.  Other hydronephrosis  Repeat renal ultrasound results noted.  Urology consulted.  Maintain Anderson catheter for now.    VTE Pharmacologic Prophylaxis: VTE Score: 3 High Risk (Score >/= 5) - Pharmacological DVT Prophylaxis Ordered: heparin. Sequential Compression Devices Ordered.    Mobility:   Basic Mobility Inpatient Raw Score: 6  JH-HLM Goal: 2: Bed activities/Dependent transfer  JH-HLM Achieved: 2: Bed activities/Dependent transfer  JH-HLM Goal achieved. Continue to encourage appropriate mobility.    Patient Centered Rounds: I performed bedside rounds with nursing staff today.   Discussions with Specialists or Other Care Team Provider: Discussed with nursing, urology    Education and Discussions with Family  / Patient: Attempted to update  (brother) via phone. Unable to contact.    Current Length of Stay: 2 day(s)  Current Patient Status: Inpatient   Certification Statement: The patient will continue to require additional inpatient hospital stay due to ongoing monitoring/management of electrolyte deficiency and renal failure  Discharge Plan: Anticipate discharge in 48-72 hrs to rehab facility.    Code Status: Level 1 - Full Code    Subjective   Patient seen and examined at bedside.  No acute seizure-like activity reported.  Poor oral intake.  Lethargic but arousable.  Tmax 100.8    Objective :  Temp:  [96.8 °F (36 °C)-100.8 °F (38.2 °C)] 96.8 °F (36 °C)  HR:  [] 112  BP: ()/(52-81) 106/65  Resp:  [20-36] 20  SpO2:  [87 %-99 %] 96 %  O2 Device: None (Room air)    Body mass index is 25.03 kg/m².     Input and Output Summary (last 24 hours):     Intake/Output Summary (Last 24 hours) at 3/7/2025 1116  Last data filed at 3/7/2025 1028  Gross per 24 hour   Intake 2732.51 ml   Output 3225 ml   Net -492.49 ml       Physical Exam  Constitutional:       General: He is not in acute distress.     Appearance: He is ill-appearing.      Comments: Lethargic but arousable.   HENT:      Head: Normocephalic.      Mouth/Throat:      Mouth: Mucous membranes are dry.   Eyes:      Pupils: Pupils are equal, round, and reactive to light.   Cardiovascular:      Rate and Rhythm: Regular rhythm. Tachycardia present.   Pulmonary:      Comments: Poor respiratory effort with diminished breath sounds at bases  Abdominal:      General: Bowel sounds are normal. There is no distension.      Palpations: Abdomen is soft.   Musculoskeletal:      Right lower leg: No edema.      Left lower leg: No edema.      Comments: Left below-knee amputation.  Right above-knee amputation.   Neurological:      Comments: Lethargic but arousable.  Moving extremities spontaneously.           Lines/Drains:  Lines/Drains/Airways       Active Status        Name Placement date Placement time Site Days    Urethral Catheter Temperature probe 16 Fr. 03/05/25  0619  Temperature probe  2                  Urinary Catheter:  Goal for removal: Remove after 48 hrs of I/O monitoring                 Lab Results: I have reviewed the following results:   Results from last 7 days   Lab Units 03/07/25  0632 03/06/25  0516 03/05/25  0444   WBC Thousand/uL 7.79   < > 8.47   HEMOGLOBIN g/dL 8.0*   < > 10.2*   HEMATOCRIT % 24.0*   < > 30.5*   PLATELETS Thousands/uL 212   < > 206   BANDS PCT %  --   --  5   LYMPHO PCT %  --   --  13*   MONO PCT %  --   --  0*   EOS PCT %  --   --  0    < > = values in this interval not displayed.     Results from last 7 days   Lab Units 03/07/25  0526 03/06/25  1223 03/06/25  0516   SODIUM mmol/L 144   < > 141   POTASSIUM mmol/L 3.1*   < > 3.7   CHLORIDE mmol/L 105   < > 114*   CO2 mmol/L 26   < > 13*   BUN mg/dL 50*   < > 86*   CREATININE mg/dL 2.22*   < > 3.68*   ANION GAP mmol/L 13   < > 14*   CALCIUM mg/dL 8.2*   < > 8.3*   ALBUMIN g/dL  --   --  2.9*   TOTAL BILIRUBIN mg/dL  --   --  0.38   ALK PHOS U/L  --   --  50   ALT U/L  --   --  17   AST U/L  --   --  28   GLUCOSE RANDOM mg/dL 128   < > 92    < > = values in this interval not displayed.     Results from last 7 days   Lab Units 03/05/25  0611   INR  1.38*             Results from last 7 days   Lab Units 03/05/25  0444   LACTIC ACID mmol/L 1.1   PROCALCITONIN ng/ml 14.58*       Recent Cultures (last 7 days):   Results from last 7 days   Lab Units 03/05/25  1044 03/05/25  0628 03/05/25  0515 03/05/25  0444   BLOOD CULTURE   --   --  No Growth at 24 hrs. No Growth at 24 hrs.   URINE CULTURE   --  >100,000 cfu/ml Providencia rettgeri*  --   --    LEGIONELLA URINARY ANTIGEN  Negative  --   --   --              Last 24 Hours Medication List:     Current Facility-Administered Medications:     acetaminophen (TYLENOL) tablet 650 mg, Q4H PRN    aspirin chewable tablet 81 mg, Daily    calcium  carbonate (TUMS) chewable tablet 1,000 mg, BID PRN    cefepime (MAXIPIME) IVPB (premix in dextrose) 1,000 mg 50 mL, Q12H, Last Rate: 1,000 mg (03/07/25 0918)    chlorhexidine (PERIDEX) 0.12 % oral rinse 15 mL, Q12H DANIELLE    Cholecalciferol (VITAMIN D3) tablet 2,000 Units, Daily    ferrous sulfate tablet 325 mg, Daily With Breakfast    heparin (porcine) subcutaneous injection 5,000 Units, Q8H DANIELLE **AND** [CANCELED] Platelet count, Once    ipratropium (ATROVENT) 0.02 % inhalation solution 0.5 mg, Q6H PRN Max 3/day    lamoTRIgine (LaMICtal) tablet 200 mg, BID    levalbuterol (XOPENEX) inhalation solution 1.25 mg, Q6H PRN    levETIRAcetam (KEPPRA) tablet 500 mg, Q24H    melatonin tablet 6 mg, HS    midodrine (PROAMATINE) tablet 10 mg, TID AC    mirtazapine (REMERON) tablet 7.5 mg, HS    multi-electrolyte (Plasmalyte-A/Isolyte-S PH 7.4/Normosol-R) IV solution, Continuous    multivitamin stress formula tablet 1 tablet, Daily    oseltamivir (TAMIFLU) capsule 30 mg, Q24H    pantoprazole (PROTONIX) EC tablet 20 mg, Early Morning    potassium chloride 20 mEq IVPB (premix), Once, Last Rate: 20 mEq (03/07/25 1042)    potassium chloride 20 mEq IVPB (premix), Once    potassium phosphates 30 mmol in sodium chloride 0.9 % 250 mL infusion, Once    potassium-sodium phosphates (PHOS-NAK) packet 1 packet, Once    senna-docusate sodium (SENOKOT S) 8.6-50 mg per tablet 2 tablet, Daily    tamsulosin (FLOMAX) capsule 0.4 mg, Daily With Dinner    thiamine tablet 100 mg, Daily    vancomycin (VANCOCIN) IVPB (premix in dextrose) 500 mg 100 mL, Daily PRN    zinc sulfate (ZINCATE) capsule 220 mg, Daily    Administrative Statements   Today, Patient Was Seen By: Linda Koroma MD      **Please Note: This note may have been constructed using a voice recognition system.**

## 2025-03-07 NOTE — ASSESSMENT & PLAN NOTE
Likely related to UTI/flu/pneumonia  Urine culture positive for providencia rettgeri   Blood cultures negative x 24 hours  On cefepime and vancomycin per primary team  Continue IV fluids  Continue midodrine 10 mg 3 times daily  TSH low, cortisol pending  Echocardiogram pending

## 2025-03-07 NOTE — ASSESSMENT & PLAN NOTE
Present on admission with a creatinine of 6.36.  Prior creatinine in 2022 were 1.1-1.4.  Likely in the setting of septic shock with ATN.  Anderson catheter was placed in the emergency room.  CT on admission showing severe bilateral hydroureteronephrosis with distended bladder  Anderson catheter was subsequently placed  Repeat renal ultrasound today shows mild to moderate dilatation of left pelvic calyceal system with severe hydronephrosis of the right kidney  Maintain Anderson catheter.  Urology consulted  Initially on bicarb infusion for metabolic acidosis now resolved.  Switch to isolate by nephrology today

## 2025-03-07 NOTE — CONSULTS
Vancomycin IV Pharmacy-to-Dose Consultation     Vancomycin has been discontinued.  Pharmacy will sign off.  Please contact or re-consult with questions.    Kaylene Ashton, Pharmacist

## 2025-03-07 NOTE — CONSULTS
Consultation - Urology   Name: Trevor Waldrop 60 y.o. male I MRN: [id number]  Unit/Bed#: -01 I Date of Admission: 3/5/2025   Date of Service: 3/7/2025 I Hospital Day: 2   Inpatient consult to Urology  Consult performed by: Rachna Bolden PA-C  Consult ordered by: Linda Koroma MD        Physician Requesting Evaluation: Linda Koroma MD   Reason for Evaluation / Principal Problem: urinary retention     Assessment & Plan  JOHNY (acute kidney injury) (HCC)  Pt is 59 y/o male with pmh for pneumonia (currently), acute cystitis, JOHNY, bipolar disorder, GERD, epilepsy, eoth abuse and anemia who presents with sepsis from influenza and UTI on 3/5.     CT A/P 3/5:  Severe bilateral hydronephroureterosis extending through the distended bladder. No radiopaque urinary tract calculi or perinephric collection. Findings suggestive of acute on chronic urinary retention. Correlate with UA to exclude superimposed cystitis.     Renal US 3/7:  Mild to moderate dilatation of the left pelvicalyceal system, decreased from the previous study of March 5, 2025  Severe hydronephrosis right kidney with cortical thinning likely sequela of chronic bladder outlet obstruction     Tmax: 102.8, P 112, R 20, /65  Creat 2.22, 3.03, 3.68, 6.38  UO: 3.4L  UA with leuks, neg nitrites, large blood  Urine cx providencia rettgeri          Plan:  Keep young permanently  Pt with chronic retention from BPH since 2022  Severe right hydro is likely sequela of chronic bladder outlet obstruction/BPH  F/u outpt for non urgent IR SPT  Trend labs  UO is adequate, pt not obstructed  Treat UTI  Plan discussed with Uro AP  Will sign off.       Septic shock (HCC)    GERD with esophagitis    Bipolar disorder (HCC)    Localization-related (focal) (partial) symptomatic epilepsy and epileptic syndromes with complex partial seizures, not intractable, with status epilepticus (HCC)    Influenza A    Left lower lobe pneumonia    Acute cystitis with hematuria  Tailor abx  to culture  Metabolic acidosis    Severe protein-calorie malnutrition (HCC)  Malnutrition Findings:   Adult Malnutrition type: Chronic illness  Adult Degree of Malnutrition: Other severe protein calorie malnutrition                     360 Statement: Pt presents with severe protein calorie malnutrition as evidenced by sunken orbitals, b/l temporal depressions and visble clavicle bone. Treat with diet and supplements BID.    BMI Findings:           Body mass index is 25.03 kg/m².     Electrolyte abnormality    Other hydronephrosis        History of Present Illness   Trevor Waldrop is a 60 y.o. male who presents with .influenza from the facility. Patient with pmhx significant for pneumonia (currently), acute cystitis, JOHNY, bipolar disorder, GERD, epilepsy, eoth abuse and anemia. He was admitted for fevers, postivie influenza and LLL consolidation on cxr and positive UTI. Pt met sepsis criteria on admission and was in the ICU.   Anderson was placed on 3/5 (16Fr) and output on insertion was 850. Pt was noted to have severe bilateral hydronephroureterosis extending through the UVJs.   Pt had imaging done in 2022 which showed     Review of Systems   Constitutional:  Negative for chills and fever.   HENT:  Negative for ear pain and sore throat.    Eyes:  Negative for pain and visual disturbance.   Respiratory:  Negative for cough and shortness of breath.    Cardiovascular:  Negative for chest pain and palpitations.   Gastrointestinal:  Negative for abdominal pain and vomiting.   Genitourinary:  Negative for dysuria and hematuria.   Musculoskeletal:  Negative for arthralgias and back pain.   Skin:  Negative for color change and rash.   Neurological:  Negative for seizures and syncope.   All other systems reviewed and are negative.    Historical Information   Past Medical History:   Diagnosis Date    Alcohol abuse     Anemia 6/14/2019    On iron.  Unclear if ever documented iron deficient    GERD (gastroesophageal reflux disease)      GERD with esophagitis 6/14/2019    EGD in [facility] 2019    Hx of AKA (above knee amputation) (HCC)     Right    Infectious viral hepatitis     Seizures (HCC)     Seizures (HCC)     Ulcer of esophagus      Past Surgical History:   Procedure Laterality Date    BELOW KNEE LEG AMPUTATION      Left    EGD  04/25/2017    LUNG DECORTICATION Left      Social History     Tobacco Use    Smoking status: Former     Passive exposure: Past    Smokeless tobacco: Never   Substance and Sexual Activity    Alcohol use: Never    Drug use: Never    Sexual activity: Not on file     E-Cigarette/Vaping     E-Cigarette/Vaping Substances     Family history non-contributory  Social History     Tobacco Use    Smoking status: Former     Passive exposure: Past    Smokeless tobacco: Never   Substance and Sexual Activity    Alcohol use: Never    Drug use: Never    Sexual activity: Not on file       Current Facility-Administered Medications:     acetaminophen (TYLENOL) tablet 650 mg, Q4H PRN    aspirin chewable tablet 81 mg, Daily    calcium carbonate (TUMS) chewable tablet 1,000 mg, BID PRN    cefepime (MAXIPIME) IVPB (premix in dextrose) 1,000 mg 50 mL, Q12H, Last Rate: 1,000 mg (03/07/25 0918)    chlorhexidine (PERIDEX) 0.12 % oral rinse 15 mL, Q12H DANIELLE    Cholecalciferol (VITAMIN D3) tablet 2,000 Units, Daily    ferrous sulfate tablet 325 mg, Daily With Breakfast    heparin (porcine) subcutaneous injection 5,000 Units, Q8H DANIELLE **AND** [CANCELED] Platelet count, Once    ipratropium (ATROVENT) 0.02 % inhalation solution 0.5 mg, Q6H PRN Max 3/day    lamoTRIgine (LaMICtal) tablet 200 mg, BID    levalbuterol (XOPENEX) inhalation solution 1.25 mg, Q6H PRN    levETIRAcetam (KEPPRA) tablet 500 mg, Q24H    melatonin tablet 6 mg, HS    midodrine (PROAMATINE) tablet 10 mg, TID AC    mirtazapine (REMERON) tablet 7.5 mg, HS    multi-electrolyte (Plasmalyte-A/Isolyte-S PH 7.4/Normosol-R) IV solution, Continuous, Last Rate: 100 mL/hr (03/07/25  1125)    multivitamin stress formula tablet 1 tablet, Daily    oseltamivir (TAMIFLU) capsule 30 mg, Q24H    pantoprazole (PROTONIX) EC tablet 20 mg, Early Morning    potassium chloride 20 mEq IVPB (premix), Once, Last Rate: 20 mEq (03/07/25 1042)    potassium chloride 20 mEq IVPB (premix), Once    potassium phosphates 30 mmol in sodium chloride 0.9 % 250 mL infusion, Once    potassium-sodium phosphates (PHOS-NAK) packet 1 packet, Once    senna-docusate sodium (SENOKOT S) 8.6-50 mg per tablet 2 tablet, Daily    tamsulosin (FLOMAX) capsule 0.4 mg, Daily With Dinner    thiamine tablet 100 mg, Daily    vancomycin (VANCOCIN) IVPB (premix in dextrose) 500 mg 100 mL, Daily PRN    zinc sulfate (ZINCATE) capsule 220 mg, Daily  Prior to Admission Medications   Prescriptions Last Dose Informant Patient Reported? Taking?   Ascorbic Acid (vitamin C) 1000 MG tablet Unknown Outside Facility (Specify) Yes No   Sig: Take 1,000 mg by mouth daily   Diclofenac Sodium (VOLTAREN) 1 % Unknown Outside Facility (Specify) Yes No   Melatonin 5 MG CAPS Unknown Outside Facility (Specify) Yes No   Sig: Take 5 mg by mouth   Multiple Vitamin (multivitamin) tablet 3/4/2025 Outside Facility (Specify) Yes Yes   Sig: Take 1 tablet by mouth daily   POTASSIUM CHLORIDE ER PO 3/4/2025 Outside Facility (Specify) Yes Yes   Sig: Take 10 mEq by mouth   Sodium Phosphates (ENEMA DISPOSABLE RE) Unknown Outside Facility (Specify) Yes No   Sig: Insert into the rectum Every 72 hours as needed     Zinc 50 MG TABS 3/4/2025 Outside Facility (Specify) Yes Yes   Sig: Take 50 mg by mouth   acetaminophen (TYLENOL) 325 mg tablet Unknown Outside Facility (Specify) Yes No   Sig: Take 1,000 mg by mouth 2 (two) times a day Chronic back pain   aluminum-magnesium hydroxide-simethicone (MYLANTA) 200-200-20 mg/5 mL suspension Unknown Outside Facility (Specify) Yes No   Sig: Take 30 mL by mouth every 4 (four) hours as needed for indigestion or heartburn   aspirin 81 mg chewable  tablet 3/4/2025 Outside Facility (Specify) Yes Yes   Sig: Chew 81 mg daily   bisacodyl (DULCOLAX) 10 mg suppository Unknown Outside Facility (Specify) Yes No   Sig: Insert 10 mg into the rectum daily   calcium carbonate (TUMS) 500 mg chewable tablet Unknown Outside Facility (Specify) Yes No   Sig: Chew 2 tablets every 4 (four) hours as needed for heartburn   cholecalciferol (VITAMIN D3) 1,000 units tablet 3/4/2025 Outside Facility (Specify) Yes Yes   Sig: Take 2,000 Units by mouth daily   ciprofloxacin (CIPRO) 500 mg tablet Not Taking Outside Facility (Specify) Yes No   Patient not taking: Reported on 2023   docusate sodium (COLACE) 100 mg capsule Unknown Outside Facility (Specify) No No   Sig: Take 1 capsule (100 mg total) by mouth 2 (two) times a day   famotidine (PEPCID) 20 mg tablet 3/4/2025 Outside Facility (Specify) No Yes   Sig: Take 1 tablet (20 mg total) by mouth daily   ferrous sulfate 325 (65 Fe) mg tablet 3/4/2025 Outside Facility (Specify) Yes Yes   Sig: Take 325 mg by mouth daily with breakfast   furosemide (LASIX) 20 mg tablet Unknown Outside Facility (Specify) Yes No   Sig: Take 10 mg by mouth daily   Patient not taking: Reported on 2023   lamoTRIgine (LaMICtal) 200 MG tablet 3/4/2025 Outside Facility (Specify) Yes Yes   Sig: Take 200 mg by mouth 2 (two) times a day    levETIRAcetam (KEPPRA) 1000 MG tablet 3/4/2025  No Yes   Sig: Take 1 tablet (1,000 mg total) by mouth 2 (two) times a day   magnesium hydroxide (MILK OF MAGNESIA) 400 mg/5 mL oral suspension Unknown Outside Facility (Specify) Yes No   Sig: Take by mouth daily as needed for constipation   midodrine (PROAMATINE) 5 mg tablet Unknown  Yes No   Sig: Take 5 mg by mouth every 12 (twelve) hours Prn for hypotension   mirtazapine (REMERON) 7.5 MG tablet 3/4/2025 Outside Facility (Specify) Yes Yes   Sig: Take 15 mg by mouth daily at bedtime    ondansetron (ZOFRAN) 4 mg tablet Unknown Outside Facility (Specify) Yes No   Si mg 2 (two)  times a day PRN prior to sodium tablets   senna-docusate sodium (SENOKOT-S) 8.6-50 mg per tablet 3/4/2025 Outside Facility (Specify) Yes Yes   Sig: Take 2 tablets by mouth daily   sodium chloride 1 g tablet 3/4/2025 Outside Facility (Specify) No Yes   Sig: Take 3 tablets (3 g total) by mouth 3 (three) times a day with meals   Patient taking differently: Take 2 g by mouth 2 (two) times a day Must crush   tamsulosin (FLOMAX) 0.4 mg 3/4/2025 Outside Facility (Specify) No Yes   Sig: Take 1 capsule (0.4 mg total) by mouth daily with dinner   thiamine 100 MG tablet 3/4/2025 Outside Facility (Specify) Yes Yes   Sig: Take 100 mg by mouth daily      Facility-Administered Medications: None     Patient has no known allergies.    Objective :  Temp:  [96.8 °F (36 °C)-100.8 °F (38.2 °C)] 96.8 °F (36 °C)  HR:  [] 112  BP: ()/(52-81) 106/65  Resp:  [20-36] 20  SpO2:  [87 %-99 %] 96 %  O2 Device: None (Room air)    I/O         03/05 0701 03/06 0700 03/06 0701  03/07 0700 03/07 0701  03/08 0700    P.O. 240 660 0    I.V. (mL/kg) 1099.6 (22.4) 3015 (66.4)     IV Piggyback 3000 150     Total Intake(mL/kg) 4339.6 (88.4) 3825 (84.3) 0 (0)    Urine (mL/kg/hr) 3400 (2.9) 3450 (3.2) 500 (2.5)    Stool 0      Total Output 3400 3450 500    Net +939.6 +375 -500           Unmeasured Stool Occurrence 0 x            Lines/Drains/Airways       Active Status       Name Placement date Placement time Site Days    Urethral Catheter Temperature probe 16 Fr. 03/05/25  0619  Temperature probe  2                  Physical Exam  Vitals and nursing note reviewed.   Constitutional:       General: He is not in acute distress.     Appearance: He is well-developed.   HENT:      Head: Normocephalic and atraumatic.   Eyes:      Conjunctiva/sclera: Conjunctivae normal.   Cardiovascular:      Rate and Rhythm: Normal rate and regular rhythm.      Heart sounds: No murmur heard.  Pulmonary:      Effort: Pulmonary effort is normal. No respiratory distress.       Breath sounds: Normal breath sounds.   Abdominal:      General: There is no distension.      Palpations: Abdomen is soft.      Tenderness: There is no abdominal tenderness.   Musculoskeletal:         General: No swelling.      Cervical back: Neck supple.   Skin:     General: Skin is warm and dry.      Capillary Refill: Capillary refill takes less than 2 seconds.   Neurological:      Mental Status: He is alert.   Psychiatric:         Mood and Affect: Mood normal.            Lab Results: I have reviewed the following results:  Recent Labs     03/05/25  0444 03/05/25  0611 03/06/25  0516 03/06/25  1223 03/07/25  0526 03/07/25  0632   WBC 8.47  --  6.39  --   --  7.79   HGB 10.2*  --  7.7*  --   --  8.0*   HCT 30.5*  --  23.8*  --   --  24.0*     --  161  --   --  212   BANDSPCT 5  --   --   --   --   --    SODIUM 135  --  141   < > 144  --    K 5.1  --  3.7   < > 3.1*  --      --  114*   < > 105  --    CO2 12*  --  13*   < > 26  --    *  --  86*   < > 50*  --    CREATININE 6.38*  --  3.68*   < > 2.22*  --    GLUC 98  --  92   < > 128  --    MG 2.6  --  2.3  --  1.8*  --    PHOS 4.0  --  4.2*  --  1.5*  --    AST 36  --  28  --   --   --    ALT 21  --  17  --   --   --    ALB 3.3*  --  2.9*  --   --   --    TBILI 0.33  --  0.38  --   --   --    ALKPHOS 62  --  50  --   --   --    PTT  --  38*  --   --   --   --    INR  --  1.38*  --   --   --   --     < > = values in this interval not displayed.     Lab Results   Component Value Date    COLORU Brown 03/05/2025    CLARITYU Cloudy 03/05/2025    SPECGRAV 1.020 03/05/2025    PHUR 8.0 03/05/2025    LEUKOCYTESUR Large (A) 03/05/2025    NITRITE Negative 03/05/2025    GLUCOSEU Negative 03/05/2025    KETONESU Negative 03/05/2025    BILIRUBINUR Negative 03/05/2025    BLOODU Large (A) 03/05/2025   ,   Lab Results   Component Value Date    URINECX >100,000 cfu/ml Providencia rettgeri (A) 03/05/2025       Imaging Results Review: I reviewed radiology reports  from this admission including: CT abdomen/pelvis and Ultrasound(s).  Other Study Results Review: No additional pertinent studies reviewed.    VTE Prophylaxis: Heparin

## 2025-03-07 NOTE — ASSESSMENT & PLAN NOTE
Required Anderson catheter placement for chronic bladder outlet obstruction and bilateral severe hydronephrosis.  Urine culture with procidentia.  Currently on cefepime.  Based on sensitivity results will transition to ceftriaxone.

## 2025-03-07 NOTE — ASSESSMENT & PLAN NOTE
Repeat renal ultrasound showed improvement in left hydronephrosis but persistent severe right hydronephrosis likely related to bladder outlet obstruction  Consider urology evaluation if creatinine does not continue to improve

## 2025-03-07 NOTE — ASSESSMENT & PLAN NOTE
Patient was admitted from nursing facility with fever and hypotension  Tested positive for influenza A and urine suggestive of urinary tract infection  Admission CT with left lower lobe consolidation  Met criteria for septic shock and required vasopressor support transiently.  Started on broad-spectrum antibiotic(Comycin and cefepime) with Tamiflu for acute influenza  Blood cultures remain negative to date.  Urine culture with procidentia  MRSA swab negative.  Vancomycin discontinued  Antibiotic switched to ceftriaxone based on sensitivity result  Blood pressure has remained stable with MAP greater than 65

## 2025-03-07 NOTE — ASSESSMENT & PLAN NOTE
Prerenal versus ATN in the setting of poor p.o. intake, sepsis, shock and obstructive uropathy   Prior creatinine 1.26 in November 2022 so most likely has element of chronic kidney disease  Creatinine on admission 6.38 from 3/5/2025 which is the peak  Creatinine currently improving to 2.2   UA: Large occult blood/large leukocytes/2+ proteinuria/30-50 RBCs/30-50 WBCs/innumerable bacteria  Imaging: CT scan demonstrating severe bilateral hydroureteronephrosis extending to UVJ's severe right renal cortical thinning no loculated perinephric collection  Repeat renal ultrasound: Kidneys symmetric and normal size.  Mild to moderate dilatation of left pelvicalyceal system which is improving.  Severe hydronephrosis of the right kidney with cortical thinning likely due to chronic bladder outlet obstruction  Maintain Anderson catheter  Continue IV fluids  Discontinue oral fluid restriction given sodium of 144

## 2025-03-07 NOTE — PROGRESS NOTES
Trevor Waldrop is a 60 y.o. male who is currently ordered Vancomycin IV with management by the Pharmacy Consult service.  Relevant clinical data and objective / subjective history reviewed.  Vancomycin Assessment:  Indication and Goal AUC/Trough: Pneumonia (goal -600, trough >10); Urinary tract infection (goal -600, trough >10); Bacteremia (goal -600, trough >10)  Clinical Status: stable  Micro:     Renal Function:  SCr: 2.22 mg/dL  CrCl: 20 mL/min  Renal replacement: Not on dialysis  Days of Therapy: 3  Current Dose: 500mg IV daily prn when random level < /= 15  Vancomycin Plan: Patient is currently on vancomycin pulse dosing due to JOHNY. Random vancomycin level drawn today with AM labs is 13.9 ug/ml. As level < 15 ug/ml, will give one time dose of vancomycin 500mg IV today.   New Dosing: No change; continue pulse dosing after one time dose today   Next Level: With AM labs at 0600 on 3/8/25  Renal Function Monitoring: Daily BMP and UOP  Pharmacy will continue to follow closely for s/sx of nephrotoxicity, infusion reactions and appropriateness of therapy.  BMP and CBC will be ordered per protocol. We will continue to follow the patient’s culture results and clinical progress daily.    Winter Wilkins, Pharmacist

## 2025-03-07 NOTE — ASSESSMENT & PLAN NOTE
Malnutrition Findings:   Adult Malnutrition type: Chronic illness  Adult Degree of Malnutrition: Other severe protein calorie malnutrition                     360 Statement: Pt presents with severe protein calorie malnutrition as evidenced by sunken orbitals, b/l temporal depressions and visble clavicle bone. Treat with diet and supplements BID.    BMI Findings:           Body mass index is 25.03 kg/m².   Encourage oral intake.  Nutrition input appreciated

## 2025-03-07 NOTE — TELEPHONE ENCOUNTER
Trevor Waldrop  Is a 60-year-old male previously seen by our service for urinary retention in 2022.  Seen in urologic consultation at [facility] for urinary retention and JOHNY.  Right hydronephrosis on CT.  Good urine output and response to bladder decompression with renal recovery.  Positive jets on the ultrasound although nonspecific.  Plan: No plans for acute surgical intervention.  But will require discussion for chronic suprapubic tube.  Please refer to previous documentation in 2022.  Patient was amenable to discuss IR SP tube placement versus 1 more trial of void and/or following hydronephrosis to determine if retrograde study is indicated nonurgently versus surveillance.  Please contact patient/caregiver with hospital follow-up appointment date and time once discharged before 4 weeks when catheter should be exchanged.

## 2025-03-07 NOTE — ASSESSMENT & PLAN NOTE
Tested positive for influenza A on admission.  Currently on Tamiflu day 3 of 5(dose renally adjusted)

## 2025-03-07 NOTE — ASSESSMENT & PLAN NOTE
Per imaging upon admission likely postviral pneumonia.  Empirically on ceftriaxone.  Trend procalcitonin.

## 2025-03-07 NOTE — ASSESSMENT & PLAN NOTE
History of seizures maintained on Lamictal and Keppra.  Will switch Keppra dose to IV as patient is refusing all medications.  Dose increased per elevated levels noted upon admission.

## 2025-03-07 NOTE — PLAN OF CARE
Problem: Potential for Falls  Goal: Patient will remain free of falls  Description: INTERVENTIONS:  - Educate patient/family on patient safety including physical limitations  - Instruct patient to call for assistance with activity   - Consult OT/PT to assist with strengthening/mobility   - Keep Call bell within reach  - Keep bed low and locked with side rails adjusted as appropriate  - Keep care items and personal belongings within reach  - Initiate and maintain comfort rounds  - Make Fall Risk Sign visible to staff  - Offer Toileting every 2 Hours, in advance of need  - Initiate/Maintain alarm  - Obtain necessary fall risk management equipment:   - Apply yellow socks and bracelet for high fall risk patients  - Consider moving patient to room near nurses station  Outcome: Progressing     Problem: Prexisting or High Potential for Compromised Skin Integrity  Goal: Skin integrity is maintained or improved  Description: INTERVENTIONS:  - Identify patients at risk for skin breakdown  - Assess and monitor skin integrity  - Assess and monitor nutrition and hydration status  - Monitor labs   - Assess for incontinence   - Turn and reposition patient  - Assist with mobility/ambulation  - Relieve pressure over bony prominences  - Avoid friction and shearing  - Provide appropriate hygiene as needed including keeping skin clean and dry  - Evaluate need for skin moisturizer/barrier cream  - Collaborate with interdisciplinary team   - Patient/family teaching  - Consider wound care consult   Outcome: Progressing     Problem: PAIN - ADULT  Goal: Verbalizes/displays adequate comfort level or baseline comfort level  Description: Interventions:  - Encourage patient to monitor pain and request assistance  - Assess pain using appropriate pain scale  - Administer analgesics based on type and severity of pain and evaluate response  - Implement non-pharmacological measures as appropriate and evaluate response  - Consider cultural and  social influences on pain and pain management  - Notify physician/advanced practitioner if interventions unsuccessful or patient reports new pain  Outcome: Progressing     Problem: INFECTION - ADULT  Goal: Absence or prevention of progression during hospitalization  Description: INTERVENTIONS:  - Assess and monitor for signs and symptoms of infection  - Monitor lab/diagnostic results  - Monitor all insertion sites, i.e. indwelling lines, tubes, and drains  - Monitor endotracheal if appropriate and nasal secretions for changes in amount and color  - [place] appropriate cooling/warming therapies per order  - Administer medications as ordered  - Instruct and encourage patient and family to use good hand hygiene technique  - Identify and instruct in appropriate isolation precautions for identified infection/condition  Outcome: Progressing  Goal: Absence of fever/infection during neutropenic period  Description: INTERVENTIONS:  - Monitor WBC    Outcome: Progressing     Problem: SAFETY ADULT  Goal: Patient will remain free of falls  Description: INTERVENTIONS:  - Educate patient/family on patient safety including physical limitations  - Instruct patient to call for assistance with activity   - Consult OT/PT to assist with strengthening/mobility   - Keep Call bell within reach  - Keep bed low and locked with side rails adjusted as appropriate  - Keep care items and personal belongings within reach  - Initiate and maintain comfort rounds  - Make Fall Risk Sign visible to staff  - Offer Toileting every 2 Hours, in advance of need  - Initiate/Maintain alarm  - Obtain necessary fall risk management equipment:   - Apply yellow socks and bracelet for high fall risk patients  - Consider moving patient to room near nurses station  Outcome: Progressing  Goal: Maintain or return to baseline ADL function  Description: INTERVENTIONS:  -  Assess patient's ability to carry out ADLs; assess patient's baseline for ADL function and  identify physical deficits which impact ability to perform ADLs (bathing, care of mouth/teeth, toileting, grooming, dressing, etc.)  - Assess/evaluate cause of self-care deficits   - Assess range of motion  - Assess patient's mobility; develop plan if impaired  - Assess patient's need for assistive devices and provide as appropriate  - Encourage maximum independence but intervene and supervise when necessary  - Involve family in performance of ADLs  - Assess for home care needs following discharge   - Consider OT consult to assist with ADL evaluation and planning for discharge  - Provide patient education as appropriate  Outcome: Progressing  Goal: Maintains/Returns to pre admission functional level  Description: INTERVENTIONS:  - Perform AM-PAC 6 Click Basic Mobility/ Daily Activity assessment daily.  - Set and communicate daily mobility goal to care team and patient/family/caregiver.   - Collaborate with rehabilitation services on mobility goals if consulted  - Perform Range of Motion 3 times a day.  - Reposition patient every 2 hours.  - Dangle patient 3 times a day  - Stand patient 3 times a day  - Ambulate patient 3 times a day  - Out of bed to chair 3 times a day   - Out of bed for meals 3 times a day  - Out of bed for toileting  - Record patient progress and toleration of activity level   Outcome: Progressing     Problem: DISCHARGE PLANNING  Goal: Discharge to home or other facility with appropriate resources  Description: INTERVENTIONS:  - Identify barriers to discharge w/patient and caregiver  - Arrange for needed discharge resources and transportation as appropriate  - Identify discharge learning needs (meds, wound care, etc.)  - Arrange for interpretive services to assist at discharge as needed  - Refer to Case Management Department for coordinating discharge planning if the patient needs post-hospital services based on physician/advanced practitioner order or complex needs related to functional status,  cognitive ability, or social support system  Outcome: Progressing     Problem: Knowledge Deficit  Goal: Patient/family/caregiver demonstrates understanding of disease process, treatment plan, medications, and discharge instructions  Description: Complete learning assessment and assess knowledge base.  Interventions:  - Provide teaching at level of understanding  - Provide teaching via preferred learning methods  Outcome: Progressing     Problem: Nutrition/Hydration-ADULT  Goal: Nutrient/Hydration intake appropriate for improving, restoring or maintaining nutritional needs  Description: Monitor and assess patient's nutrition/hydration status for malnutrition. Collaborate with interdisciplinary team and initiate plan and interventions as ordered.  Monitor patient's weight and dietary intake as ordered or per policy. Utilize nutrition screening tool and intervene as necessary. Determine patient's food preferences and provide high-protein, high-caloric foods as appropriate.     INTERVENTIONS:  - Monitor oral intake, urinary output, labs, and treatment plans  - Assess nutrition and hydration status and recommend course of action  - Evaluate amount of meals eaten  - Assist patient with eating if necessary   - Allow adequate time for meals  - Recommend/ encourage appropriate diets, oral nutritional supplements, and vitamin/mineral supplements  - Order, calculate, and assess calorie counts as needed  - Recommend, monitor, and adjust tube feedings and TPN/PPN based on assessed needs  - Assess need for intravenous fluids  - Provide specific nutrition/hydration education as appropriate  - Include patient/family/caregiver in decisions related to nutrition  Outcome: Progressing

## 2025-03-07 NOTE — ASSESSMENT & PLAN NOTE
Pt is 59 y/o male with pmh for pneumonia (currently), acute cystitis, JOHNY, bipolar disorder, GERD, epilepsy, eoth abuse and anemia who presents with sepsis from influenza and UTI on 3/5.     CT A/P 3/5:  Severe bilateral hydronephroureterosis extending through the distended bladder. No radiopaque urinary tract calculi or perinephric collection. Findings suggestive of acute on chronic urinary retention. Correlate with UA to exclude superimposed cystitis.     Renal US 3/7:  Mild to moderate dilatation of the left pelvicalyceal system, decreased from the previous study of March 5, 2025  Severe hydronephrosis right kidney with cortical thinning likely sequela of chronic bladder outlet obstruction     Tmax: 102.8, P 112, R 20, /65  Creat 2.22, 3.03, 3.68, 6.38  UO: 3.4L  UA with leuks, neg nitrites, large blood  Urine cx providencia rettgeri          Plan:  Keep young permanently  Pt with chronic retention from BPH since 2022  Severe right hydro is likely sequela of chronic bladder outlet obstruction/BPH  F/u outpt for non urgent IR SPT  Trend labs  UO is adequate, pt not obstructed  Treat UTI  Plan discussed with Uro AP  Will sign off.

## 2025-03-08 LAB
ANION GAP SERPL CALCULATED.3IONS-SCNC: 12 MMOL/L (ref 4–13)
BASOPHILS # BLD AUTO: 0.03 THOUSANDS/ÂΜL (ref 0–0.1)
BASOPHILS NFR BLD AUTO: 0 % (ref 0–1)
BUN SERPL-MCNC: 36 MG/DL (ref 5–25)
CALCIUM SERPL-MCNC: 7.8 MG/DL (ref 8.4–10.2)
CHLORIDE SERPL-SCNC: 111 MMOL/L (ref 96–108)
CO2 SERPL-SCNC: 22 MMOL/L (ref 21–32)
CREAT SERPL-MCNC: 1.65 MG/DL (ref 0.6–1.3)
EOSINOPHIL # BLD AUTO: 0.17 THOUSAND/ÂΜL (ref 0–0.61)
EOSINOPHIL NFR BLD AUTO: 2 % (ref 0–6)
ERYTHROCYTE [DISTWIDTH] IN BLOOD BY AUTOMATED COUNT: 13.1 % (ref 11.6–15.1)
GFR SERPL CREATININE-BSD FRML MDRD: 44 ML/MIN/1.73SQ M
GLUCOSE SERPL-MCNC: 91 MG/DL (ref 65–140)
HCT VFR BLD AUTO: 27.9 % (ref 36.5–49.3)
HGB BLD-MCNC: 8.6 G/DL (ref 12–17)
IMM GRANULOCYTES # BLD AUTO: 0.1 THOUSAND/UL (ref 0–0.2)
IMM GRANULOCYTES NFR BLD AUTO: 1 % (ref 0–2)
LYMPHOCYTES # BLD AUTO: 0.61 THOUSANDS/ÂΜL (ref 0.6–4.47)
LYMPHOCYTES NFR BLD AUTO: 6 % (ref 14–44)
MAGNESIUM SERPL-MCNC: 2.1 MG/DL (ref 1.9–2.7)
MCH RBC QN AUTO: 31.5 PG (ref 26.8–34.3)
MCHC RBC AUTO-ENTMCNC: 30.8 G/DL (ref 31.4–37.4)
MCV RBC AUTO: 102 FL (ref 82–98)
MONOCYTES # BLD AUTO: 0.3 THOUSAND/ÂΜL (ref 0.17–1.22)
MONOCYTES NFR BLD AUTO: 3 % (ref 4–12)
NEUTROPHILS # BLD AUTO: 8.48 THOUSANDS/ÂΜL (ref 1.85–7.62)
NEUTS SEG NFR BLD AUTO: 88 % (ref 43–75)
NRBC BLD AUTO-RTO: 0 /100 WBCS
PHOSPHATE SERPL-MCNC: 3 MG/DL (ref 2.3–4.1)
PLATELET # BLD AUTO: 193 THOUSANDS/UL (ref 149–390)
PMV BLD AUTO: 10.2 FL (ref 8.9–12.7)
POTASSIUM SERPL-SCNC: 4.1 MMOL/L (ref 3.5–5.3)
RBC # BLD AUTO: 2.73 MILLION/UL (ref 3.88–5.62)
SODIUM SERPL-SCNC: 145 MMOL/L (ref 135–147)
WBC # BLD AUTO: 9.69 THOUSAND/UL (ref 4.31–10.16)

## 2025-03-08 PROCEDURE — 83735 ASSAY OF MAGNESIUM: CPT | Performed by: PHYSICIAN ASSISTANT

## 2025-03-08 PROCEDURE — 84100 ASSAY OF PHOSPHORUS: CPT | Performed by: PHYSICIAN ASSISTANT

## 2025-03-08 PROCEDURE — 99232 SBSQ HOSP IP/OBS MODERATE 35: CPT | Performed by: INTERNAL MEDICINE

## 2025-03-08 PROCEDURE — 85025 COMPLETE CBC W/AUTO DIFF WBC: CPT | Performed by: INTERNAL MEDICINE

## 2025-03-08 PROCEDURE — 80048 BASIC METABOLIC PNL TOTAL CA: CPT | Performed by: PHYSICIAN ASSISTANT

## 2025-03-08 PROCEDURE — 99232 SBSQ HOSP IP/OBS MODERATE 35: CPT

## 2025-03-08 RX ORDER — DEXTROSE MONOHYDRATE AND SODIUM CHLORIDE 5; .45 G/100ML; G/100ML
100 INJECTION, SOLUTION INTRAVENOUS CONTINUOUS
Status: DISCONTINUED | OUTPATIENT
Start: 2025-03-08 | End: 2025-03-09

## 2025-03-08 RX ADMIN — DEXTROSE AND SODIUM CHLORIDE 100 ML/HR: 5; .45 INJECTION, SOLUTION INTRAVENOUS at 13:45

## 2025-03-08 RX ADMIN — LEVETIRACETAM 500 MG: 500 INJECTION, SOLUTION INTRAVENOUS at 09:06

## 2025-03-08 RX ADMIN — SODIUM CHLORIDE, SODIUM ACETATE ANHYDROUS, SODIUM GLUCONATE, POTASSIUM CHLORIDE, AND MAGNESIUM CHLORIDE 100 ML/HR: 526; 222; 502; 37; 30 INJECTION, SOLUTION INTRAVENOUS at 01:09

## 2025-03-08 RX ADMIN — ACETAMINOPHEN 1000 MG: 10 INJECTION INTRAVENOUS at 05:18

## 2025-03-08 RX ADMIN — CEFTRIAXONE 1000 MG: 1 INJECTION, SOLUTION INTRAVENOUS at 20:14

## 2025-03-08 RX ADMIN — HEPARIN SODIUM 5000 UNITS: 5000 INJECTION INTRAVENOUS; SUBCUTANEOUS at 05:20

## 2025-03-08 RX ADMIN — HEPARIN SODIUM 5000 UNITS: 5000 INJECTION INTRAVENOUS; SUBCUTANEOUS at 21:50

## 2025-03-08 RX ADMIN — ACETAMINOPHEN 1000 MG: 10 INJECTION INTRAVENOUS at 13:57

## 2025-03-08 RX ADMIN — LEVETIRACETAM 500 MG: 500 INJECTION, SOLUTION INTRAVENOUS at 21:50

## 2025-03-08 RX ADMIN — SODIUM CHLORIDE, SODIUM ACETATE ANHYDROUS, SODIUM GLUCONATE, POTASSIUM CHLORIDE, AND MAGNESIUM CHLORIDE 100 ML/HR: 526; 222; 502; 37; 30 INJECTION, SOLUTION INTRAVENOUS at 12:01

## 2025-03-08 RX ADMIN — HEPARIN SODIUM 5000 UNITS: 5000 INJECTION INTRAVENOUS; SUBCUTANEOUS at 13:45

## 2025-03-08 RX ADMIN — ACETAMINOPHEN 1000 MG: 10 INJECTION INTRAVENOUS at 21:50

## 2025-03-08 NOTE — PLAN OF CARE
Problem: Potential for Falls  Goal: Patient will remain free of falls  Description: INTERVENTIONS:  - Educate patient/family on patient safety including physical limitations  - Instruct patient to call for assistance with activity   - Consult OT/PT to assist with strengthening/mobility   - Keep Call bell within reach  - Keep bed low and locked with side rails adjusted as appropriate  - Keep care items and personal belongings within reach  - Initiate and maintain comfort rounds  - Make Fall Risk Sign visible to staff  - Offer Toileting every x Hours, in advance of need  - Initiate/Maintain bedalarm  - Obtain necessary fall risk management equipment: x  - Apply yellow socks and bracelet for high fall risk patients  - Consider moving patient to room near nurses station  Outcome: Progressing     Problem: Prexisting or High Potential for Compromised Skin Integrity  Goal: Skin integrity is maintained or improved  Description: INTERVENTIONS:  - Identify patients at risk for skin breakdown  - Assess and monitor skin integrity  - Assess and monitor nutrition and hydration status  - Monitor labs   - Assess for incontinence   - Turn and reposition patient  - Assist with mobility/ambulation  - Relieve pressure over bony prominences  - Avoid friction and shearing  - Provide appropriate hygiene as needed including keeping skin clean and dry  - Evaluate need for skin moisturizer/barrier cream  - Collaborate with interdisciplinary team   - Patient/family teaching  - Consider wound care consult   Outcome: Progressing     Problem: PAIN - ADULT  Goal: Verbalizes/displays adequate comfort level or baseline comfort level  Description: Interventions:  - Encourage patient to monitor pain and request assistance  - Assess pain using appropriate pain scale  - Administer analgesics based on type and severity of pain and evaluate response  - Implement non-pharmacological measures as appropriate and evaluate response  - Consider cultural and  social influences on pain and pain management  - Notify physician/advanced practitioner if interventions unsuccessful or patient reports new pain  Outcome: Progressing     Problem: INFECTION - ADULT  Goal: Absence or prevention of progression during hospitalization  Description: INTERVENTIONS:  - Assess and monitor for signs and symptoms of infection  - Monitor lab/diagnostic results  - Monitor all insertion sites, i.e. indwelling lines, tubes, and drains  - Monitor endotracheal if appropriate and nasal secretions for changes in amount and color  - [place] appropriate cooling/warming therapies per order  - Administer medications as ordered  - Instruct and encourage patient and family to use good hand hygiene technique  - Identify and instruct in appropriate isolation precautions for identified infection/condition  Outcome: Progressing  Goal: Absence of fever/infection during neutropenic period  Description: INTERVENTIONS:  - Monitor WBC    Outcome: Progressing     Problem: SAFETY ADULT  Goal: Patient will remain free of falls  Description: INTERVENTIONS:  - Educate patient/family on patient safety including physical limitations  - Instruct patient to call for assistance with activity   - Consult OT/PT to assist with strengthening/mobility   - Keep Call bell within reach  - Keep bed low and locked with side rails adjusted as appropriate  - Keep care items and personal belongings within reach  - Initiate and maintain comfort rounds  - Make Fall Risk Sign visible to staff  - Offer Toileting every x Hours, in advance of need  - Initiate/Maintain bedalarm  - Obtain necessary fall risk management equipment: x  - Apply yellow socks and bracelet for high fall risk patients  - Consider moving patient to room near nurses station  Outcome: Progressing  Goal: Maintain or return to baseline ADL function  Description: INTERVENTIONS:  -  Assess patient's ability to carry out ADLs; assess patient's baseline for ADL function and  identify physical deficits which impact ability to perform ADLs (bathing, care of mouth/teeth, toileting, grooming, dressing, etc.)  - Assess/evaluate cause of self-care deficits   - Assess range of motion  - Assess patient's mobility; develop plan if impaired  - Assess patient's need for assistive devices and provide as appropriate  - Encourage maximum independence but intervene and supervise when necessary  - Involve family in performance of ADLs  - Assess for home care needs following discharge   - Consider OT consult to assist with ADL evaluation and planning for discharge  - Provide patient education as appropriate  Outcome: Progressing  Goal: Maintains/Returns to pre admission functional level  Description: INTERVENTIONS:  - Perform AM-PAC 6 Click Basic Mobility/ Daily Activity assessment daily.  - Set and communicate daily mobility goal to care team and patient/family/caregiver.   - Collaborate with rehabilitation services on mobility goals if consulted  - Perform Range of Motion x times a day.  - Reposition patient every x hours.  - Dangle patient x times a day  - Stand patient x times a day  - Ambulate patient x times a day  - Out of bed to chair x times a day   - Out of bed for meals x times a day  - Out of bed for toileting  - Record patient progress and toleration of activity level   Outcome: Progressing     Problem: DISCHARGE PLANNING  Goal: Discharge to home or other facility with appropriate resources  Description: INTERVENTIONS:  - Identify barriers to discharge w/patient and caregiver  - Arrange for needed discharge resources and transportation as appropriate  - Identify discharge learning needs (meds, wound care, etc.)  - Arrange for interpretive services to assist at discharge as needed  - Refer to Case Management Department for coordinating discharge planning if the patient needs post-hospital services based on physician/advanced practitioner order or complex needs related to functional status,  cognitive ability, or social support system  Outcome: Progressing     Problem: Knowledge Deficit  Goal: Patient/family/caregiver demonstrates understanding of disease process, treatment plan, medications, and discharge instructions  Description: Complete learning assessment and assess knowledge base.  Interventions:  - Provide teaching at level of understanding  - Provide teaching via preferred learning methods  Outcome: Progressing     Problem: Nutrition/Hydration-ADULT  Goal: Nutrient/Hydration intake appropriate for improving, restoring or maintaining nutritional needs  Description: Monitor and assess patient's nutrition/hydration status for malnutrition. Collaborate with interdisciplinary team and initiate plan and interventions as ordered.  Monitor patient's weight and dietary intake as ordered or per policy. Utilize nutrition screening tool and intervene as necessary. Determine patient's food preferences and provide high-protein, high-caloric foods as appropriate.     INTERVENTIONS:  - Monitor oral intake, urinary output, labs, and treatment plans  - Assess nutrition and hydration status and recommend course of action  - Evaluate amount of meals eaten  - Assist patient with eating if necessary   - Allow adequate time for meals  - Recommend/ encourage appropriate diets, oral nutritional supplements, and vitamin/mineral supplements  - Order, calculate, and assess calorie counts as needed  - Recommend, monitor, and adjust tube feedings and TPN/PPN based on assessed needs  - Assess need for intravenous fluids  - Provide specific nutrition/hydration education as appropriate  - Include patient/family/caregiver in decisions related to nutrition  Outcome: Progressing

## 2025-03-08 NOTE — ASSESSMENT & PLAN NOTE
On antibiotics awaiting cultures per critical care medicine    Anemia:  - Check iron studies  - Current hemoglobin 8.6 monitor

## 2025-03-08 NOTE — ASSESSMENT & PLAN NOTE
Repeat renal ultrasound showed improvement in left hydronephrosis but persistent severe right hydronephrosis likely related to bladder outlet obstruction  Appreciate urology consultation: Continue Anderson catheter for now per their recommendation and trend renal function; get interval renal ultrasound in a few weeks to determine bladder is decompressed?  Potential SPT

## 2025-03-08 NOTE — ASSESSMENT & PLAN NOTE
Repeat renal ultrasound results noted.    Urology consulted.    Maintain Anderson catheter for now.

## 2025-03-08 NOTE — PLAN OF CARE
Problem: Potential for Falls  Goal: Patient will remain free of falls  Description: INTERVENTIONS:  - Educate patient/family on patient safety including physical limitations  - Instruct patient to call for assistance with activity   - Consult OT/PT to assist with strengthening/mobility   - Keep Call bell within reach  - Keep bed low and locked with side rails adjusted as appropriate  - Keep care items and personal belongings within reach  - Initiate and maintain comfort rounds  - Make Fall Risk Sign visible to staff  - Offer Toileting every 2 Hours, in advance of need  - Initiate/Maintain bed/chair alarm  - Obtain necessary fall risk management equipment: yellow bracelet, yellow socks, yellow fall risk flag displayed   - Apply yellow socks and bracelet for high fall risk patients  - Consider moving patient to room near nurses station  Outcome: Progressing     Problem: Prexisting or High Potential for Compromised Skin Integrity  Goal: Skin integrity is maintained or improved  Description: INTERVENTIONS:  - Identify patients at risk for skin breakdown  - Assess and monitor skin integrity  - Assess and monitor nutrition and hydration status  - Monitor labs   - Assess for incontinence   - Turn and reposition patient  - Assist with mobility/ambulation  - Relieve pressure over bony prominences  - Avoid friction and shearing  - Provide appropriate hygiene as needed including keeping skin clean and dry  - Evaluate need for skin moisturizer/barrier cream  - Collaborate with interdisciplinary team   - Patient/family teaching  - Consider wound care consult   Outcome: Progressing     Problem: PAIN - ADULT  Goal: Verbalizes/displays adequate comfort level or baseline comfort level  Description: Interventions:  - Encourage patient to monitor pain and request assistance  - Assess pain using appropriate pain scale  - Administer analgesics based on type and severity of pain and evaluate response  - Implement non-pharmacological  measures as appropriate and evaluate response  - Consider cultural and social influences on pain and pain management  - Notify physician/advanced practitioner if interventions unsuccessful or patient reports new pain  Outcome: Progressing     Problem: INFECTION - ADULT  Goal: Absence or prevention of progression during hospitalization  Description: INTERVENTIONS:  - Assess and monitor for signs and symptoms of infection  - Monitor lab/diagnostic results  - Monitor all insertion sites, i.e. indwelling lines, tubes, and drains  - Monitor endotracheal if appropriate and nasal secretions for changes in amount and color  - [place] appropriate cooling/warming therapies per order  - Administer medications as ordered  - Instruct and encourage patient and family to use good hand hygiene technique  - Identify and instruct in appropriate isolation precautions for identified infection/condition  Outcome: Progressing  Goal: Absence of fever/infection during neutropenic period  Description: INTERVENTIONS:  - Monitor WBC    Outcome: Progressing     Problem: SAFETY ADULT  Goal: Patient will remain free of falls  Description: INTERVENTIONS:  - Educate patient/family on patient safety including physical limitations  - Instruct patient to call for assistance with activity   - Consult OT/PT to assist with strengthening/mobility   - Keep Call bell within reach  - Keep bed low and locked with side rails adjusted as appropriate  - Keep care items and personal belongings within reach  - Initiate and maintain comfort rounds  - Make Fall Risk Sign visible to staff  - Offer Toileting every 2 Hours, in advance of need  - Initiate/Maintain bed/chair alarm  - Obtain necessary fall risk management equipment: yellow bracelet, yellow socks, yellow fall risk flag displayed   - Apply yellow socks and bracelet for high fall risk patients  - Consider moving patient to room near nurses station  Outcome: Progressing  Goal: Maintain or return to  baseline ADL function  Description: INTERVENTIONS:  -  Assess patient's ability to carry out ADLs; assess patient's baseline for ADL function and identify physical deficits which impact ability to perform ADLs (bathing, care of mouth/teeth, toileting, grooming, dressing, etc.)  - Assess/evaluate cause of self-care deficits   - Assess range of motion  - Assess patient's mobility; develop plan if impaired  - Assess patient's need for assistive devices and provide as appropriate  - Encourage maximum independence but intervene and supervise when necessary  - Involve family in performance of ADLs  - Assess for home care needs following discharge   - Consider OT consult to assist with ADL evaluation and planning for discharge  - Provide patient education as appropriate  Outcome: Progressing  Goal: Maintains/Returns to pre admission functional level  Description: INTERVENTIONS:  - Perform AM-PAC 6 Click Basic Mobility/ Daily Activity assessment daily.  - Set and communicate daily mobility goal to care team and patient/family/caregiver.   - Collaborate with rehabilitation services on mobility goals if consulted  - Perform Range of Motion 2 times a day.  - Reposition patient every 2 hours.  - Dangle patient 2 times a day  - Stand patient 3 times a day  - Ambulate patient 3 times a day  - Out of bed to chair 3 times a day   - Out of bed for meals 3 times a day  - Out of bed for toileting  - Record patient progress and toleration of activity level   Outcome: Progressing     Problem: DISCHARGE PLANNING  Goal: Discharge to home or other facility with appropriate resources  Description: INTERVENTIONS:  - Identify barriers to discharge w/patient and caregiver  - Arrange for needed discharge resources and transportation as appropriate  - Identify discharge learning needs (meds, wound care, etc.)  - Arrange for interpretive services to assist at discharge as needed  - Refer to Case Management Department for coordinating discharge  planning if the patient needs post-hospital services based on physician/advanced practitioner order or complex needs related to functional status, cognitive ability, or social support system  Outcome: Progressing     Problem: Knowledge Deficit  Goal: Patient/family/caregiver demonstrates understanding of disease process, treatment plan, medications, and discharge instructions  Description: Complete learning assessment and assess knowledge base.  Interventions:  - Provide teaching at level of understanding  - Provide teaching via preferred learning methods  Outcome: Progressing     Problem: Nutrition/Hydration-ADULT  Goal: Nutrient/Hydration intake appropriate for improving, restoring or maintaining nutritional needs  Description: Monitor and assess patient's nutrition/hydration status for malnutrition. Collaborate with interdisciplinary team and initiate plan and interventions as ordered.  Monitor patient's weight and dietary intake as ordered or per policy. Utilize nutrition screening tool and intervene as necessary. Determine patient's food preferences and provide high-protein, high-caloric foods as appropriate.     INTERVENTIONS:  - Monitor oral intake, urinary output, labs, and treatment plans  - Assess nutrition and hydration status and recommend course of action  - Evaluate amount of meals eaten  - Assist patient with eating if necessary   - Allow adequate time for meals  - Recommend/ encourage appropriate diets, oral nutritional supplements, and vitamin/mineral supplements  - Order, calculate, and assess calorie counts as needed  - Recommend, monitor, and adjust tube feedings and TPN/PPN based on assessed needs  - Assess need for intravenous fluids  - Provide specific nutrition/hydration education as appropriate  - Include patient/family/caregiver in decisions related to nutrition  Outcome: Progressing

## 2025-03-08 NOTE — PROGRESS NOTES
Progress Note - Hospitalist   Name: Trevor Waldrop 60 y.o. male I MRN: [id number]  Unit/Bed#: -01 I Date of Admission: 3/5/2025   Date of Service: 3/8/2025 I Hospital Day: 3    Assessment & Plan  Septic shock (HCC)  Patient was admitted from nursing facility with fever and hypotension  Tested positive for influenza A and urine suggestive of urinary tract infection  Admission CT with left lower lobe consolidation  Met criteria for septic shock and required vasopressor support transiently.  Started on broad-spectrum antibiotic (vancomycin and cefepime) with Tamiflu for acute influenza  Blood cultures remain negative to date.  Urine culture with procidentia  MRSA swab negative.  Vancomycin discontinued  Antibiotic switched to ceftriaxone based on sensitivity result  Blood pressure has remained stable with MAP greater than 65  Continue to monitor vitals, trend for any fevers.  GERD with esophagitis  History of seizures maintained on Lamictal and Keppra.  Will switch Keppra dose to IV as patient is refusing all medications.  Dose increased per elevated levels noted upon admission.  Bipolar disorder (HCC)  Continue with home medications  Localization-related (focal) (partial) symptomatic epilepsy and epileptic syndromes with complex partial seizures, not intractable, with status epilepticus ([facility])    Influenza A  Tested positive for influenza A on admission.  Unfortunately, has been unable to get Tamiflu doses for 2 days due to lethargy and awaiting speech eval due to concern for pocketing of meds.  Resume administration of tamiflu pending speech eval.  Left lower lobe pneumonia  Per imaging upon admission likely postviral pneumonia.  Empirically on ceftriaxone.  Trend procalcitonin.  Acute cystitis with hematuria  Required Anderson catheter placement for chronic bladder outlet obstruction and bilateral severe hydronephrosis.  Urine culture with procidentia.  Currently on cefepime.  Based on sensitivity results will  transition to ceftriaxone.  JOHNY (acute kidney injury) (HCC)  Present on admission with a creatinine of 6.36.  Prior creatinine in 2022 were 1.1-1.4.  Likely in the setting of septic shock with ATN.  Anderson catheter was placed in the emergency room.  CT on admission showing severe bilateral hydroureteronephrosis with distended bladder  Anderson catheter was subsequently placed  Repeat renal ultrasound shows mild to moderate dilatation of left pelvic calyceal system with severe hydronephrosis of the right kidney  Maintain Anderson catheter.  Urology consulted  Initially on bicarb infusion for metabolic acidosis now resolved.  Isolyte DC per nephrology. Started on D5.  Metabolic acidosis  Improved w/ bicarb gtt. Now discontinued per nephro.  Severe protein-calorie malnutrition (HCC)  Malnutrition Findings:   Adult Malnutrition type: Chronic illness  Adult Degree of Malnutrition: Other severe protein calorie malnutrition                     360 Statement: Pt presents with severe protein calorie malnutrition as evidenced by sunken orbitals, b/l temporal depressions and visble clavicle bone. Treat with diet and supplements BID.    BMI Findings:           Body mass index is 25.77 kg/m².   Encourage oral intake.  Nutrition input appreciated  Electrolyte abnormality  Multiple electrolyte abnormalities including hypokalemia hypomagnesemia and hypophosphatemia.  Replacement initiated.  Other hydronephrosis  Repeat renal ultrasound results noted.    Urology consulted.    Maintain Anderson catheter for now.    VTE Pharmacologic Prophylaxis: VTE Score: 3 Moderate Risk (Score 3-4) - Pharmacological DVT Prophylaxis Ordered: heparin.    Mobility:   Basic Mobility Inpatient Raw Score: 6  JH-HLM Goal: 2: Bed activities/Dependent transfer  JH-HLM Achieved: 2: Bed activities/Dependent transfer  JH-HLM Goal achieved. Continue to encourage appropriate mobility.    Patient Centered Rounds: I performed bedside rounds with nursing staff today.    Discussions with Specialists or Other Care Team Provider: nursing and CM    Education and Discussions with Family / Patient: Attempted to update  (brother) via phone. Unable to contact.    Current Length of Stay: 3 day(s)  Current Patient Status: Inpatient   Certification Statement: The patient will continue to require additional inpatient hospital stay due to continued monitoring/management of renal failure.  Discharge Plan: Anticipate discharge in 48-72 hrs to rehab facility.    Code Status: Level 1 - Full Code    Subjective   Laying in bed and sleeping. Looks ill. Denies pain.    Objective :  Temp:  [98.2 °F (36.8 °C)-101.8 °F (38.8 °C)] 99.1 °F (37.3 °C)  HR:  [] 96  BP: ()/(43-71) 92/56  SpO2:  [91 %-95 %] 94 %  O2 Device: None (Room air)    Body mass index is 25.77 kg/m².     Input and Output Summary (last 24 hours):     Intake/Output Summary (Last 24 hours) at 3/8/2025 1152  Last data filed at 3/8/2025 0600  Gross per 24 hour   Intake 3298.75 ml   Output 1575 ml   Net 1723.75 ml       Physical Exam  Vitals reviewed.   Constitutional:       General: He is not in acute distress.     Appearance: He is ill-appearing.      Comments: Lethargic   HENT:      Head: Normocephalic.      Mouth/Throat:      Mouth: Mucous membranes are moist.   Eyes:      Conjunctiva/sclera: Conjunctivae normal.   Cardiovascular:      Rate and Rhythm: Regular rhythm. Tachycardia present.      Pulses: Normal pulses.      Heart sounds: Normal heart sounds.   Pulmonary:      Effort: Pulmonary effort is normal. No respiratory distress.   Abdominal:      General: Bowel sounds are normal. There is no distension.      Tenderness: There is no abdominal tenderness.   Musculoskeletal:      Right lower leg: No edema.      Left lower leg: No edema.   Skin:     General: Skin is warm.         Lines/Drains:  Lines/Drains/Airways       Active Status       Name Placement date Placement time Site Days    Urethral Catheter  Temperature probe 16 Fr. 03/05/25  0619  Temperature probe  3                  Urinary Catheter:  Goal for removal: Voiding trial when ambulation improves                 Lab Results: I have reviewed the following results:   Results from last 7 days   Lab Units 03/08/25  0927 03/06/25  0516 03/05/25  0444   WBC Thousand/uL 9.69   < > 8.47   HEMOGLOBIN g/dL 8.6*   < > 10.2*   HEMATOCRIT % 27.9*   < > 30.5*   PLATELETS Thousands/uL 193   < > 206   BANDS PCT %  --   --  5   SEGS PCT % 88*  --   --    LYMPHO PCT % 6*  --  13*   MONO PCT % 3*  --  0*   EOS PCT % 2  --  0    < > = values in this interval not displayed.     Results from last 7 days   Lab Units 03/08/25  0511 03/06/25  1223 03/06/25  0516   SODIUM mmol/L 145   < > 141   POTASSIUM mmol/L 4.1   < > 3.7   CHLORIDE mmol/L 111*   < > 114*   CO2 mmol/L 22   < > 13*   BUN mg/dL 36*   < > 86*   CREATININE mg/dL 1.65*   < > 3.68*   ANION GAP mmol/L 12   < > 14*   CALCIUM mg/dL 7.8*   < > 8.3*   ALBUMIN g/dL  --   --  2.9*   TOTAL BILIRUBIN mg/dL  --   --  0.38   ALK PHOS U/L  --   --  50   ALT U/L  --   --  17   AST U/L  --   --  28   GLUCOSE RANDOM mg/dL 91   < > 92    < > = values in this interval not displayed.     Results from last 7 days   Lab Units 03/05/25  0611   INR  1.38*             Results from last 7 days   Lab Units 03/05/25  0444   LACTIC ACID mmol/L 1.1   PROCALCITONIN ng/ml 14.58*       Recent Cultures (last 7 days):   Results from last 7 days   Lab Units 03/05/25  1044 03/05/25  0628 03/05/25  0515 03/05/25  0444   BLOOD CULTURE   --   --  No Growth at 48 hrs. No Growth at 48 hrs.   URINE CULTURE   --  >100,000 cfu/ml Providencia rettgeri*  --   --    LEGIONELLA URINARY ANTIGEN  Negative  --   --   --        Imaging Results Review: No pertinent imaging studies reviewed.  Other Study Results Review: No additional pertinent studies reviewed.    Last 24 Hours Medication List:     Current Facility-Administered Medications:     acetaminophen (Ofirmev)  injection 1,000 mg, Q8H PRN, Last Rate: 1,000 mg (03/08/25 0518)    acetaminophen (TYLENOL) tablet 650 mg, Q4H PRN    aspirin chewable tablet 81 mg, Daily    calcium carbonate (TUMS) chewable tablet 1,000 mg, BID PRN    cefTRIAXone (ROCEPHIN) IVPB (premix in dextrose) 1,000 mg 50 mL, Q24H, Last Rate: 1,000 mg (03/07/25 2037)    chlorhexidine (PERIDEX) 0.12 % oral rinse 15 mL, Q12H DANIELLE    Cholecalciferol (VITAMIN D3) tablet 2,000 Units, Daily    ferrous sulfate tablet 325 mg, Daily With Breakfast    heparin (porcine) subcutaneous injection 5,000 Units, Q8H DANIELLE **AND** [CANCELED] Platelet count, Once    ipratropium (ATROVENT) 0.02 % inhalation solution 0.5 mg, Q6H PRN Max 3/day    lamoTRIgine (LaMICtal) tablet 200 mg, BID    levalbuterol (XOPENEX) inhalation solution 1.25 mg, Q6H PRN    levETIRAcetam (KEPPRA) injection 500 mg, Q12H DANIELLE    [Held by provider] levETIRAcetam (KEPPRA) tablet 500 mg, Q24H    melatonin tablet 6 mg, HS    midodrine (PROAMATINE) tablet 10 mg, TID AC    mirtazapine (REMERON) tablet 7.5 mg, HS    multi-electrolyte (Plasmalyte-A/Isolyte-S PH 7.4/Normosol-R) IV solution, Continuous, Last Rate: 100 mL/hr (03/08/25 0109)    multivitamin stress formula tablet 1 tablet, Daily    oseltamivir (TAMIFLU) capsule 30 mg, Q24H    pantoprazole (PROTONIX) EC tablet 20 mg, Early Morning    potassium-sodium phosphates (PHOS-NAK) packet 1 packet, Once    senna-docusate sodium (SENOKOT S) 8.6-50 mg per tablet 2 tablet, Daily    tamsulosin (FLOMAX) capsule 0.4 mg, Daily With Dinner    thiamine tablet 100 mg, Daily    zinc sulfate (ZINCATE) capsule 220 mg, Daily    Administrative Statements   Today, Patient Was Seen By: Jazmin Childers, DO  I have spent a total time of 30 minutes in caring for this patient on the day of the visit/encounter including Instructions for management, Patient and family education, Counseling / Coordination of care, Documenting in the medical record, Reviewing/placing orders in the medical  record (including tests, medications, and/or procedures), Obtaining or reviewing history  , and Communicating with other healthcare professionals .    **Please Note: This note may have been constructed using a voice recognition system.**

## 2025-03-08 NOTE — ASSESSMENT & PLAN NOTE
Tested positive for influenza A on admission.  Unfortunately, has been unable to get Tamiflu doses for 2 days due to lethargy and awaiting speech eval due to concern for pocketing of meds.  Resume administration of tamiflu pending speech eval.

## 2025-03-08 NOTE — ASSESSMENT & PLAN NOTE
Likely related to UTI/flu/pneumonia  Urine culture positive for providencia rettgeri   Workup for other causes: Cortisol acceptable 26.9; low TSH increased free T4/EF 55%    Current blood pressure improving    Recommend:  - Continue midodrine  - Continue IV fluids  - Potential adjustment of thyroid supplementation for primary service will discuss with them

## 2025-03-08 NOTE — PROGRESS NOTES
Progress Note - Nephrology   Name: Trevor Waldrop 60 y.o. male I MRN: [id number]  Unit/Bed#: -01 I Date of Admission: 3/5/2025   Date of Service: 3/8/2025 I Hospital Day: 3     Assessment & Plan  JOHNY (acute kidney injury) (HCC)  Prerenal versus ATN in the setting of poor p.o. intake, sepsis, shock and obstructive uropathy   Prior creatinine 1.26 in November 2022 so most likely has element of chronic kidney disease  Creatinine on admission 6.38 from 3/5/2025 which is the peak  UA: Large occult blood/large leukocytes/2+ proteinuria/30-50 RBCs/30-50 WBCs/innumerable bacteria  Imaging: CT scan demonstrating severe bilateral hydroureteronephrosis extending to UVJ's severe right renal cortical thinning no loculated perinephric collection  Repeat renal ultrasound: Kidneys symmetric and normal size.  Mild to moderate dilatation of left pelvicalyceal system which is improving.  Severe hydronephrosis of the right kidney with cortical thinning likely due to chronic bladder outlet obstruction    Current creatinine: Improving to 1.65 approaching baseline    Recommend  - Continue IV fluids  - Avoid nephrotoxic agents such as NSAIDs      Other hydronephrosis  Repeat renal ultrasound showed improvement in left hydronephrosis but persistent severe right hydronephrosis likely related to bladder outlet obstruction  Appreciate urology consultation: Continue Anderson catheter for now per their recommendation and trend renal function; get interval renal ultrasound in a few weeks to determine bladder is decompressed?  Potential SPT  Metabolic acidosis  Resolved on Isolyte  Septic shock (HCC)  Likely related to UTI/flu/pneumonia  Urine culture positive for providencia rettgeri   Workup for other causes: Cortisol acceptable 26.9; low TSH increased free T4/EF 55%    Current blood pressure improving    Recommend:  - Continue midodrine  - Continue IV fluids  - Potential adjustment of thyroid supplementation for primary service will discuss  with them  Influenza A  Tamiflu per primary team  Electrolyte abnormality  Essentially all normal borderline hyponatremia adjust fluids to 1/2 saline  MBD all acceptable  Left lower lobe pneumonia  On broad-spectrum antibiotics per critical care medicine  Acute cystitis with hematuria  On antibiotics awaiting cultures per critical care medicine    Anemia:  - Check iron studies  - Current hemoglobin 8.6 monitor    I have reviewed the nephrology recommendations including adjustment of fluids with half-normal saline, with SLIM, and we are in agreement with renal plan including the information outlined above.     Subjective   Brief History of Admission -we are seeing for acute kidney injury    Nonverbal unable to get history    Objective :  Temp:  [98.2 °F (36.8 °C)-101.8 °F (38.8 °C)] 99.1 °F (37.3 °C)  HR:  [] 111  BP: ()/(43-71) 111/65  SpO2:  [91 %-95 %] 94 %  O2 Device: None (Room air)    Current Weight: Weight - Scale: 46.7 kg (102 lb 15.3 oz)  First Weight: Weight - Scale: 50 kg (110 lb 3.7 oz)  I/O         03/06 0701  03/07 0700 03/07 0701  03/08 0700 03/08 0701  03/09 0700    P.O. 660 300     I.V. (mL/kg) 3015 (66.4) 1479.6 (31.7)     IV Piggyback 150 1519.2     Total Intake(mL/kg) 3825 (84.3) 3298.8 (70.6)     Urine (mL/kg/hr) 3450 (3.2) 2075 (1.9)     Stool  0     Total Output 3450 2075     Net +375 +1223.8            Unmeasured Stool Occurrence  1 x           Physical Exam  Physical Exam: General:  No acute distress/nonresponsive to verbal stimuli frail-appearing  Skin:  No acute rash  Eyes:  No scleral icterus and noninjected  ENT:  Moist mucous membranes  Neck:  Supple, no jugular venous distention, trachea midline, overall appearance is normal  Chest:  Clear to auscultation but poor effort  CVS:  Regular rate and rhythm, without a rub or gallops  Abdomen:  Normal bowel sounds, soft and nontender and nondistended  Extremities:  No edema, status post right AKA left BKA no significant upper arm  arthritic changes  Neuro: Cannot assess not cooperative  Psych: Somewhat lethargic at times nonverbal    Medications:    Current Facility-Administered Medications:     acetaminophen (Ofirmev) injection 1,000 mg, 1,000 mg, Intravenous, Q8H PRN, Linda Koroma MD, Last Rate: 400 mL/hr at 03/08/25 0518, 1,000 mg at 03/08/25 0518    acetaminophen (TYLENOL) tablet 650 mg, 650 mg, Oral, Q4H PRN, NANI Roche, 650 mg at 03/06/25 1149    aspirin chewable tablet 81 mg, 81 mg, Oral, Daily, NANI Roche, 81 mg at 03/06/25 1418    calcium carbonate (TUMS) chewable tablet 1,000 mg, 1,000 mg, Oral, BID PRN, NANI Roche    cefTRIAXone (ROCEPHIN) IVPB (premix in dextrose) 1,000 mg 50 mL, 1,000 mg, Intravenous, Q24H, Linda Koroma MD, Last Rate: 100 mL/hr at 03/07/25 2037, 1,000 mg at 03/07/25 2037    chlorhexidine (PERIDEX) 0.12 % oral rinse 15 mL, 15 mL, Mouth/Throat, Q12H DANIELLE, NANI Roche, 15 mL at 03/06/25 2044    Cholecalciferol (VITAMIN D3) tablet 2,000 Units, 2,000 Units, Oral, Daily, NANI Roche, 2,000 Units at 03/06/25 1418    ferrous sulfate tablet 325 mg, 325 mg, Oral, Daily With Breakfast, NANI Roche    heparin (porcine) subcutaneous injection 5,000 Units, 5,000 Units, Subcutaneous, Q8H DANIELLE, 5,000 Units at 03/08/25 0520 **AND** [CANCELED] Platelet count, , , Once, NANI Roche    ipratropium (ATROVENT) 0.02 % inhalation solution 0.5 mg, 0.5 mg, Nebulization, Q6H PRN Max 3/day, Irene Powers MD    lamoTRIgine (LaMICtal) tablet 200 mg, 200 mg, Oral, BID, NANI Roche, 200 mg at 03/07/25 1604    levalbuterol (XOPENEX) inhalation solution 1.25 mg, 1.25 mg, Nebulization, Q6H PRN, Irene Powers MD    levETIRAcetam (KEPPRA) injection 500 mg, 500 mg, Intravenous, Q12H DANIELLE, Linda Koroma MD, 500 mg at 03/08/25 0906    [Held by provider] levETIRAcetam (KEPPRA) tablet 500 mg, 500 mg, Oral, Q24H, NANI Roche, 500 mg at 03/06/25  1028    melatonin tablet 6 mg, 6 mg, Oral, HS, NANI Roche, 6 mg at 03/07/25 2233    midodrine (PROAMATINE) tablet 10 mg, 10 mg, Oral, TID AC, NANI Roche, 10 mg at 03/07/25 1544    mirtazapine (REMERON) tablet 7.5 mg, 7.5 mg, Oral, HS, NANI Roche, 7.5 mg at 03/07/25 2233    multi-electrolyte (Plasmalyte-A/Isolyte-S PH 7.4/Normosol-R) IV solution, 100 mL/hr, Intravenous, Continuous, Deysi Hoffmann PA-C, Last Rate: 100 mL/hr at 03/08/25 1201, 100 mL/hr at 03/08/25 1201    multivitamin stress formula tablet 1 tablet, 1 tablet, Oral, Daily, NANI Roche, 1 tablet at 03/06/25 1418    oseltamivir (TAMIFLU) capsule 30 mg, 30 mg, Oral, Q24H, NANI Roche, 30 mg at 03/06/25 0855    pantoprazole (PROTONIX) EC tablet 20 mg, 20 mg, Oral, Early Morning, NANI Roche, 20 mg at 03/06/25 0532    potassium-sodium phosphates (PHOS-NAK) packet 1 packet, 1 packet, Oral, Once, Linda Koroma MD    senna-docusate sodium (SENOKOT S) 8.6-50 mg per tablet 2 tablet, 2 tablet, Oral, Daily, NANI Roche, 2 tablet at 03/06/25 1418    tamsulosin (FLOMAX) capsule 0.4 mg, 0.4 mg, Oral, Daily With Dinner, NANI Roche, 0.4 mg at 03/07/25 1543    thiamine tablet 100 mg, 100 mg, Oral, Daily, NANI Roche, 100 mg at 03/06/25 1418    zinc sulfate (ZINCATE) capsule 220 mg, 220 mg, Oral, Daily, NANI Roche, 220 mg at 03/06/25 1418      Lab Results: I have reviewed the following results:  Results from last 7 days   Lab Units 03/08/25  0927 03/08/25  0511 03/07/25  0632 03/07/25  0526 03/06/25  1223 03/06/25  0516 03/05/25  0444   WBC Thousand/uL 9.69  --  7.79  --   --  6.39 8.47   HEMOGLOBIN g/dL 8.6*  --  8.0*  --   --  7.7* 10.2*   HEMATOCRIT % 27.9*  --  24.0*  --   --  23.8* 30.5*   PLATELETS Thousands/uL 193  --  212  --   --  161 206   POTASSIUM mmol/L  --  4.1  --  3.1* 3.2* 3.7 5.1   CHLORIDE mmol/L  --  111*  --  105 110* 114*  "107   CO2 mmol/L  --  22  --  26 15* 13* 12*   BUN mg/dL  --  36*  --  50* 73* 86* 109*   CREATININE mg/dL  --  1.65*  --  2.22* 3.03* 3.68* 6.38*   CALCIUM mg/dL  --  7.8*  --  8.2* 8.3* 8.3* 9.0   MAGNESIUM mg/dL  --  2.1  --  1.8*  --  2.3 2.6   PHOSPHORUS mg/dL  --  3.0  --  1.5*  --  4.2* 4.0   ALBUMIN g/dL  --   --   --   --   --  2.9* 3.3*       Administrative Statements     Portions of the record may have been created with voice recognition software. Occasional wrong word or \"sound a like\" substitutions may have occurred due to the inherent limitations of voice recognition software. Read the chart carefully and recognize, using context, where substitutions have occurred.If you have any questions, please contact the dictating provider.  "

## 2025-03-08 NOTE — ASSESSMENT & PLAN NOTE
Patient was admitted from nursing facility with fever and hypotension  Tested positive for influenza A and urine suggestive of urinary tract infection  Admission CT with left lower lobe consolidation  Met criteria for septic shock and required vasopressor support transiently.  Started on broad-spectrum antibiotic (vancomycin and cefepime) with Tamiflu for acute influenza  Blood cultures remain negative to date.  Urine culture with procidentia  MRSA swab negative.  Vancomycin discontinued  Antibiotic switched to ceftriaxone based on sensitivity result  Blood pressure has remained stable with MAP greater than 65  Continue to monitor vitals, trend for any fevers.

## 2025-03-08 NOTE — ASSESSMENT & PLAN NOTE
Malnutrition Findings:   Adult Malnutrition type: Chronic illness  Adult Degree of Malnutrition: Other severe protein calorie malnutrition                     360 Statement: Pt presents with severe protein calorie malnutrition as evidenced by sunken orbitals, b/l temporal depressions and visble clavicle bone. Treat with diet and supplements BID.    BMI Findings:           Body mass index is 25.77 kg/m².   Encourage oral intake.  Nutrition input appreciated

## 2025-03-08 NOTE — ASSESSMENT & PLAN NOTE
Prerenal versus ATN in the setting of poor p.o. intake, sepsis, shock and obstructive uropathy   Prior creatinine 1.26 in November 2022 so most likely has element of chronic kidney disease  Creatinine on admission 6.38 from 3/5/2025 which is the peak  UA: Large occult blood/large leukocytes/2+ proteinuria/30-50 RBCs/30-50 WBCs/innumerable bacteria  Imaging: CT scan demonstrating severe bilateral hydroureteronephrosis extending to UVJ's severe right renal cortical thinning no loculated perinephric collection  Repeat renal ultrasound: Kidneys symmetric and normal size.  Mild to moderate dilatation of left pelvicalyceal system which is improving.  Severe hydronephrosis of the right kidney with cortical thinning likely due to chronic bladder outlet obstruction    Current creatinine: Improving to 1.65 approaching baseline    Recommend  - Continue IV fluids  - Avoid nephrotoxic agents such as NSAIDs

## 2025-03-09 PROBLEM — G40.909 SEIZURE DISORDER (HCC): Status: ACTIVE | Noted: 2025-03-09

## 2025-03-09 LAB
ANION GAP SERPL CALCULATED.3IONS-SCNC: 12 MMOL/L (ref 4–13)
BASOPHILS # BLD AUTO: 0.02 THOUSANDS/ÂΜL (ref 0–0.1)
BASOPHILS NFR BLD AUTO: 0 % (ref 0–1)
BUN SERPL-MCNC: 28 MG/DL (ref 5–25)
CALCIUM SERPL-MCNC: 8.2 MG/DL (ref 8.4–10.2)
CHLORIDE SERPL-SCNC: 109 MMOL/L (ref 96–108)
CO2 SERPL-SCNC: 27 MMOL/L (ref 21–32)
CREAT SERPL-MCNC: 1.5 MG/DL (ref 0.6–1.3)
EOSINOPHIL # BLD AUTO: 0.24 THOUSAND/ÂΜL (ref 0–0.61)
EOSINOPHIL NFR BLD AUTO: 2 % (ref 0–6)
ERYTHROCYTE [DISTWIDTH] IN BLOOD BY AUTOMATED COUNT: 13.1 % (ref 11.6–15.1)
GFR SERPL CREATININE-BSD FRML MDRD: 49 ML/MIN/1.73SQ M
GLUCOSE SERPL-MCNC: 101 MG/DL (ref 65–140)
GLUCOSE SERPL-MCNC: 105 MG/DL (ref 65–140)
GLUCOSE SERPL-MCNC: 109 MG/DL (ref 65–140)
HCT VFR BLD AUTO: 27.8 % (ref 36.5–49.3)
HGB BLD-MCNC: 8.7 G/DL (ref 12–17)
IMM GRANULOCYTES # BLD AUTO: 0.12 THOUSAND/UL (ref 0–0.2)
IMM GRANULOCYTES NFR BLD AUTO: 1 % (ref 0–2)
LYMPHOCYTES # BLD AUTO: 0.71 THOUSANDS/ÂΜL (ref 0.6–4.47)
LYMPHOCYTES NFR BLD AUTO: 7 % (ref 14–44)
MAGNESIUM SERPL-MCNC: 1.8 MG/DL (ref 1.9–2.7)
MCH RBC QN AUTO: 31.3 PG (ref 26.8–34.3)
MCHC RBC AUTO-ENTMCNC: 31.3 G/DL (ref 31.4–37.4)
MCV RBC AUTO: 100 FL (ref 82–98)
MONOCYTES # BLD AUTO: 0.27 THOUSAND/ÂΜL (ref 0.17–1.22)
MONOCYTES NFR BLD AUTO: 3 % (ref 4–12)
NEUTROPHILS # BLD AUTO: 9.09 THOUSANDS/ÂΜL (ref 1.85–7.62)
NEUTS SEG NFR BLD AUTO: 87 % (ref 43–75)
NRBC BLD AUTO-RTO: 0 /100 WBCS
PLATELET # BLD AUTO: 263 THOUSANDS/UL (ref 149–390)
PMV BLD AUTO: 10.1 FL (ref 8.9–12.7)
POTASSIUM SERPL-SCNC: 3.5 MMOL/L (ref 3.5–5.3)
RBC # BLD AUTO: 2.78 MILLION/UL (ref 3.88–5.62)
SODIUM SERPL-SCNC: 148 MMOL/L (ref 135–147)
WBC # BLD AUTO: 10.45 THOUSAND/UL (ref 4.31–10.16)

## 2025-03-09 PROCEDURE — 83735 ASSAY OF MAGNESIUM: CPT

## 2025-03-09 PROCEDURE — 80048 BASIC METABOLIC PNL TOTAL CA: CPT

## 2025-03-09 PROCEDURE — 99232 SBSQ HOSP IP/OBS MODERATE 35: CPT | Performed by: INTERNAL MEDICINE

## 2025-03-09 PROCEDURE — 99233 SBSQ HOSP IP/OBS HIGH 50: CPT | Performed by: HOSPITALIST

## 2025-03-09 PROCEDURE — 85025 COMPLETE CBC W/AUTO DIFF WBC: CPT

## 2025-03-09 PROCEDURE — 82948 REAGENT STRIP/BLOOD GLUCOSE: CPT

## 2025-03-09 RX ORDER — DEXTROSE AND SODIUM CHLORIDE 5; .2 G/100ML; G/100ML
100 INJECTION, SOLUTION INTRAVENOUS CONTINUOUS
Status: DISCONTINUED | OUTPATIENT
Start: 2025-03-09 | End: 2025-03-10

## 2025-03-09 RX ORDER — METHIMAZOLE 5 MG/1
5 TABLET ORAL DAILY
Status: DISCONTINUED | OUTPATIENT
Start: 2025-03-09 | End: 2025-03-14 | Stop reason: HOSPADM

## 2025-03-09 RX ORDER — MAGNESIUM SULFATE HEPTAHYDRATE 40 MG/ML
2 INJECTION, SOLUTION INTRAVENOUS ONCE
Status: COMPLETED | OUTPATIENT
Start: 2025-03-09 | End: 2025-03-09

## 2025-03-09 RX ADMIN — LEVETIRACETAM 500 MG: 500 INJECTION, SOLUTION INTRAVENOUS at 21:55

## 2025-03-09 RX ADMIN — DEXTROSE AND SODIUM CHLORIDE 100 ML/HR: 5; .2 INJECTION, SOLUTION INTRAVENOUS at 11:46

## 2025-03-09 RX ADMIN — HEPARIN SODIUM 5000 UNITS: 5000 INJECTION INTRAVENOUS; SUBCUTANEOUS at 20:54

## 2025-03-09 RX ADMIN — MAGNESIUM SULFATE HEPTAHYDRATE 2 G: 2 INJECTION, SOLUTION INTRAVENOUS at 10:14

## 2025-03-09 RX ADMIN — DEXTROSE AND SODIUM CHLORIDE 100 ML/HR: 5; .45 INJECTION, SOLUTION INTRAVENOUS at 00:45

## 2025-03-09 RX ADMIN — LEVETIRACETAM 500 MG: 500 INJECTION, SOLUTION INTRAVENOUS at 09:57

## 2025-03-09 RX ADMIN — DEXTROSE AND SODIUM CHLORIDE 100 ML/HR: 5; .45 INJECTION, SOLUTION INTRAVENOUS at 06:06

## 2025-03-09 RX ADMIN — HEPARIN SODIUM 5000 UNITS: 5000 INJECTION INTRAVENOUS; SUBCUTANEOUS at 15:21

## 2025-03-09 RX ADMIN — CEFTRIAXONE 1000 MG: 1 INJECTION, SOLUTION INTRAVENOUS at 20:53

## 2025-03-09 RX ADMIN — HEPARIN SODIUM 5000 UNITS: 5000 INJECTION INTRAVENOUS; SUBCUTANEOUS at 06:06

## 2025-03-09 NOTE — ASSESSMENT & PLAN NOTE
On antibiotics awaiting cultures per critical care medicine    Anemia:  - Check iron studies  - Current hemoglobin 8.7monitor

## 2025-03-09 NOTE — PROGRESS NOTES
Progress Note - Hospitalist   Name: Trevor Waldrop 60 y.o. male I MRN: [id number]  Unit/Bed#: -01 I Date of Admission: 3/5/2025   Date of Service: 3/9/2025 I Hospital Day: 4    Assessment & Plan  Septic shock ([facility])  Patient was admitted from nursing facility with fever and hypotension  Tested positive for influenza A and urine suggestive of urinary tract infection  Admission CT with left lower lobe consolidation  Met criteria for septic shock and required vasopressor support transiently.  Started on broad-spectrum antibiotic (vancomycin and cefepime) with Tamiflu for acute influenza  Blood cultures remain negative to date.  Urine culture with procidentia  MRSA swab negative.  Vancomycin discontinued  Antibiotic switched to ceftriaxone based on sensitivity result  Blood pressure has remained stable with MAP greater than 65  Remains intermittently febrile  Cont IVF support  GERD with esophagitis  -cont PPI  Bipolar disorder ([facility])  Continue Lamictal  Localization-related (focal) (partial) symptomatic epilepsy and epileptic syndromes with complex partial seizures, not intractable, with status epilepticus ([facility])  -cont Keppra  Influenza A  -cont Tamiflu  Left lower lobe pneumonia  -CT chest: Left lower lobe airspace consolidation and bilateral multi lobar tree-in-bud infiltrates may represent pneumonia and/or aspiration. Bilateral lower lobe segmental and subsegmental endobronchial opacification, left greater than right may represent mucous plugging and/or aspiration. Stable right upper lobe cystic lesion with mural nodularity and left upper lobe spiculated lesion unchanged since November 2020. Noncontrast chest CT follow-up in 12 months is advised.   -cont Rocephin  Acute cystitis with hematuria  -Required Anderson catheter placement for chronic bladder outlet obstruction and bilateral severe hydronephrosis.    -Urine culture with procidentia.    -cont Rocphein  JOHNY (acute kidney injury) ([facility])  -POA with a creatinine of  6.36.  Prior creatinine in 2022 were 1.1-1.4.  Likely in the setting of septic shock with ATN.  Anderson catheter was placed in the emergency room.  -CT A/P on admission showing severe bilateral hydroureteronephrosis with distended bladder  -Anderson catheter was subsequently placed  -Repeat renal ultrasound shows mild to moderate dilatation of left pelvic calyceal system with severe hydronephrosis of the right kidney  -Patient seen by urology, continue Anderson catheter.  Consideration for suprapubic tube in the future  -renal following, discussed with renal today. With hypernatremia (Na 148) IVFs will be changed to more hypotonic IVFs  Metabolic acidosis  -resolved w/bicarb gtt  Severe protein-calorie malnutrition (HCC)  Malnutrition Findings:   Adult Malnutrition type: Chronic illness  Adult Degree of Malnutrition: Other severe protein calorie malnutrition                     360 Statement: Pt presents with severe protein calorie malnutrition as evidenced by sunken orbitals, b/l temporal depressions and visble clavicle bone. Treat with diet and supplements BID.    BMI Findings:           Body mass index is 25.44 kg/m².   Encourage oral intake.  Nutrition input appreciated  Electrolyte abnormality  Multiple electrolyte abnormalities including hypokalemia hypomagnesemia and hypophosphatemia.    -2g IV Mg today  Other hydronephrosis  Repeat renal ultrasound results noted.    Urology consulted.    Maintain Anderson catheter for now.  Seizure disorder (HCC)  -cont Keppra    VTE Pharmacologic Prophylaxis: VTE Score: 3 Moderate Risk (Score 3-4) - Pharmacological DVT Prophylaxis Ordered: heparin.    Mobility:   Basic Mobility Inpatient Raw Score: 6  JH-HLM Goal: 2: Bed activities/Dependent transfer  JH-HLM Achieved: 2: Bed activities/Dependent transfer  JH-HLM Goal achieved. Continue to encourage appropriate mobility.    Patient Centered Rounds: I performed bedside rounds with nursing staff today.   Discussions with Specialists or  Other Care Team Provider: renal    Current Length of Stay: 4 day(s)  Current Patient Status: Inpatient   Certification Statement: IVFs, abx  Discharge Plan: Anticipate discharge in 24-48 hrs to rehab facility.    Code Status: Level 1 - Full Code    Subjective   No new complaints.  Denies chest pain or shortness of breath.    Objective :  Temp:  [98.8 °F (37.1 °C)-101.8 °F (38.8 °C)] 98.8 °F (37.1 °C)  HR:  [104-121] 105  BP: ()/(60-66) 104/66  SpO2:  [87 %-96 %] 96 %  O2 Device: Nasal cannula  Nasal Cannula O2 Flow Rate (L/min):  [2 L/min] 2 L/min    Body mass index is 25.44 kg/m².     Input and Output Summary (last 24 hours):     Intake/Output Summary (Last 24 hours) at 3/9/2025 0911  Last data filed at 3/8/2025 2149  Gross per 24 hour   Intake 0 ml   Output 1000 ml   Net -1000 ml       Physical Exam  Gen: NAD, awake and alert, appears chronically ill malnourished  Eyes: EOMI, PERRLA, no scleral icterus  ENMT: no nasal discharge, no otic discharge, moist mucous membranes  Neck:  Supple  Cardiovascular:  Regular rate and rhythm, normal S1-S2, no murmurs, rubs, or gallops  Lungs:  Clear to auscultation bilaterally, no wheezes, or rales, or rhonchi  Abdomen:  Positive bowel sounds, soft, nontender, nondistended  Skin:  Intact, no obvious lesions or rashes, no edema  Neuro: Cranial nerves 2-12 are intact, non-focal    Lines/Drains:  Lines/Drains/Airways       Active Status       Name Placement date Placement time Site Days    Urethral Catheter Temperature probe 16 Fr. 03/05/25  0619  Temperature probe  4                  Urinary Catheter:  Goal for removal: N/A- Discharging with Anderson                 Lab Results: I have reviewed the following results:   Results from last 7 days   Lab Units 03/09/25  0439 03/06/25  0516 03/05/25  0444   WBC Thousand/uL 10.45*   < > 8.47   HEMOGLOBIN g/dL 8.7*   < > 10.2*   HEMATOCRIT % 27.8*   < > 30.5*   PLATELETS Thousands/uL 263   < > 206   BANDS PCT %  --   --  5   SEGS PCT %  87*   < >  --    LYMPHO PCT % 7*   < > 13*   MONO PCT % 3*   < > 0*   EOS PCT % 2   < > 0    < > = values in this interval not displayed.     Results from last 7 days   Lab Units 03/09/25  0439 03/06/25  1223 03/06/25  0516   SODIUM mmol/L 148*   < > 141   POTASSIUM mmol/L 3.5   < > 3.7   CHLORIDE mmol/L 109*   < > 114*   CO2 mmol/L 27   < > 13*   BUN mg/dL 28*   < > 86*   CREATININE mg/dL 1.50*   < > 3.68*   ANION GAP mmol/L 12   < > 14*   CALCIUM mg/dL 8.2*   < > 8.3*   ALBUMIN g/dL  --   --  2.9*   TOTAL BILIRUBIN mg/dL  --   --  0.38   ALK PHOS U/L  --   --  50   ALT U/L  --   --  17   AST U/L  --   --  28   GLUCOSE RANDOM mg/dL 101   < > 92    < > = values in this interval not displayed.     Results from last 7 days   Lab Units 03/05/25  0611   INR  1.38*             Results from last 7 days   Lab Units 03/05/25  0444   LACTIC ACID mmol/L 1.1   PROCALCITONIN ng/ml 14.58*       Recent Cultures (last 7 days):   Results from last 7 days   Lab Units 03/05/25  1044 03/05/25  0628 03/05/25  0515 03/05/25  0444   BLOOD CULTURE   --   --  No Growth at 72 hrs. No Growth at 72 hrs.   URINE CULTURE   --  >100,000 cfu/ml Providencia rettgeri*  --   --    LEGIONELLA URINARY ANTIGEN  Negative  --   --   --        Last 24 Hours Medication List:     Current Facility-Administered Medications:     acetaminophen (Ofirmev) injection 1,000 mg, Q8H PRN, Last Rate: 1,000 mg (03/08/25 2150)    acetaminophen (TYLENOL) tablet 650 mg, Q4H PRN    aspirin chewable tablet 81 mg, Daily    calcium carbonate (TUMS) chewable tablet 1,000 mg, BID PRN    cefTRIAXone (ROCEPHIN) IVPB (premix in dextrose) 1,000 mg 50 mL, Q24H, Last Rate: 1,000 mg (03/08/25 2014)    chlorhexidine (PERIDEX) 0.12 % oral rinse 15 mL, Q12H DANIELLE    Cholecalciferol (VITAMIN D3) tablet 2,000 Units, Daily    dextrose 5 % and sodium chloride 0.45 % infusion, Continuous, Last Rate: 100 mL/hr (03/09/25 0606)    ferrous sulfate tablet 325 mg, Daily With Breakfast    heparin  (porcine) subcutaneous injection 5,000 Units, Q8H DANIELLE **AND** [CANCELED] Platelet count, Once    ipratropium (ATROVENT) 0.02 % inhalation solution 0.5 mg, Q6H PRN Max 3/day    lamoTRIgine (LaMICtal) tablet 200 mg, BID    levalbuterol (XOPENEX) inhalation solution 1.25 mg, Q6H PRN    levETIRAcetam (KEPPRA) injection 500 mg, Q12H DANIELLE    [Held by provider] levETIRAcetam (KEPPRA) tablet 500 mg, Q24H    melatonin tablet 6 mg, HS    midodrine (PROAMATINE) tablet 10 mg, TID AC    mirtazapine (REMERON) tablet 7.5 mg, HS    multivitamin stress formula tablet 1 tablet, Daily    oseltamivir (TAMIFLU) capsule 30 mg, Q24H    pantoprazole (PROTONIX) EC tablet 20 mg, Early Morning    potassium-sodium phosphates (PHOS-NAK) packet 1 packet, Once    senna-docusate sodium (SENOKOT S) 8.6-50 mg per tablet 2 tablet, Daily    tamsulosin (FLOMAX) capsule 0.4 mg, Daily With Dinner    thiamine tablet 100 mg, Daily    zinc sulfate (ZINCATE) capsule 220 mg, Daily    Administrative Statements   Today, Patient Was Seen By: Urbano Naidu MD  I have spent a total time of 50 minutes in caring for this patient on the day of the visit/encounter including Diagnostic results, Impressions, Counseling / Coordination of care, Documenting in the medical record, Reviewing/placing orders in the medical record (including tests, medications, and/or procedures), Obtaining or reviewing history  , and Communicating with other healthcare professionals .    **Please Note: This note may have been constructed using a voice recognition system.**   Opzelura Counseling:  I discussed with the patient the risks of Opzelura including but not limited to nasopharngitis, bronchitis, ear infection, eosinophila, hives, diarrhea, folliculitis, tonsillitis, and rhinorrhea.  Taken orally, this medication has been linked to serious infections; higher rate of mortality; malignancy and lymphoproliferative disorders; major adverse cardiovascular events; thrombosis; thrombocytopenia, anemia, and neutropenia; and lipid elevations.

## 2025-03-09 NOTE — PROGRESS NOTES
Progress Note - Nephrology   Name: Trevor Waldrop 60 y.o. male I MRN: [id number]  Unit/Bed#: -01 I Date of Admission: 3/5/2025   Date of Service: 3/9/2025 I Hospital Day: 4     Assessment & Plan  JOHNY (acute kidney injury) (HCC)  Prerenal versus ATN in the setting of poor p.o. intake, sepsis, shock and obstructive uropathy   Prior creatinine 1.26 in November 2022 so most likely has element of chronic kidney disease  Creatinine on admission 6.38 from 3/5/2025 which is the peak  UA: Large occult blood/large leukocytes/2+ proteinuria/30-50 RBCs/30-50 WBCs/innumerable bacteria  Imaging: CT scan demonstrating severe bilateral hydroureteronephrosis extending to UVJ's severe right renal cortical thinning no loculated perinephric collection  Repeat renal ultrasound: Kidneys symmetric and normal size.  Mild to moderate dilatation of left pelvicalyceal system which is improving.  Severe hydronephrosis of the right kidney with cortical thinning likely due to chronic bladder outlet obstruction    Current creatinine: Improving to 1.50 approaching baseline    Recommend  - Continue IV fluids but switch to hypotonic fluids  - Avoid nephrotoxic agents such as NSAIDs      Other hydronephrosis  Repeat renal ultrasound showed improvement in left hydronephrosis but persistent severe right hydronephrosis likely related to bladder outlet obstruction  Appreciate urology consultation: Continue Anderson catheter for now per their recommendation and trend renal function; get interval renal ultrasound in a few weeks to determine bladder is decompressed?  Potential SPT  Metabolic acidosis  Resolved   Septic shock (HCC)  Likely related to UTI/flu/pneumonia  Urine culture positive for providencia rettgeri   Workup for other causes: Cortisol acceptable 26.9; low TSH increased free T4/EF 55%    Current blood pressure improving    Recommend:  - Continue midodrine  - Continue IV fluids  - Potential adjustment of thyroid supplementation for primary  service will discuss with them  Influenza A  Tamiflu per primary team  Electrolyte abnormality  Hypernatremia will lower sodium content and IV fluids  Replete hypomagnesemia  Left lower lobe pneumonia  On broad-spectrum antibiotics per critical care medicine  Acute cystitis with hematuria  On antibiotics awaiting cultures per critical care medicine    Anemia:  - Check iron studies  - Current hemoglobin 8.7monitor  Seizure disorder (HCC)  Primary service    I have reviewed the nephrology recommendations including adjusting IV fluids for the hypernatremia/addressing thyroid abnormality, with SLIM, and we are in agreement with renal plan including the information outlined above.     Subjective   Brief History of Admission -we are seeing for acute kidney injury in the setting of septic shock    Denies any symptoms  No chest pain or shortness of breath  No nausea vomiting or diarrhea  Anderson catheter in place    Objective :  Temp:  [98.8 °F (37.1 °C)-101.8 °F (38.8 °C)] 98.8 °F (37.1 °C)  HR:  [104-121] 105  BP: ()/(60-66) 104/66  SpO2:  [87 %-96 %] 96 %  O2 Device: Nasal cannula  Nasal Cannula O2 Flow Rate (L/min):  [2 L/min] 2 L/min    Current Weight: Weight - Scale: 46.1 kg (101 lb 10.1 oz)  First Weight: Weight - Scale: 50 kg (110 lb 3.7 oz)  I/O         03/07 0701  03/08 0700 03/08 0701  03/09 0700 03/09 0701  03/10 0700    P.O. 300 0     I.V. (mL/kg) 1479.6 (31.7)      IV Piggyback 1519.2      Total Intake(mL/kg) 3298.8 (70.6) 0 (0)     Urine (mL/kg/hr) 2075 (1.9) 1000 (0.9)     Stool 0      Total Output 2075 1000     Net +1223.8 -1000            Unmeasured Stool Occurrence 1 x            Physical Exam  Physical Exam: General:  No acute distress actually communicative today!  Skin:  No acute rash  Eyes:  No scleral icterus and noninjected  ENT:  Moist mucous membranes  Neck:  Supple, no jugular venous distention, trachea midline, overall appearance is normal  Chest:  Clear to auscultation  CVS:  Regular rate  and rhythm, without a rub or gallops  Abdomen:  Normal bowel sounds, soft and nontender and nondistended  Extremities:  No edema, and no cyanosis, no significant arthritic changes; status post right AKA and left BKA  Neuro:  No gross focality but limited cooperation  Psych:  Alert not oriented but verbally communicative    Medications:    Current Facility-Administered Medications:     acetaminophen (Ofirmev) injection 1,000 mg, 1,000 mg, Intravenous, Q8H PRN, Linda Koroma MD, Last Rate: 400 mL/hr at 03/08/25 2150, 1,000 mg at 03/08/25 2150    acetaminophen (TYLENOL) tablet 650 mg, 650 mg, Oral, Q4H PRN, NANI Roche, 650 mg at 03/06/25 1149    aspirin chewable tablet 81 mg, 81 mg, Oral, Daily, NANI Roche, 81 mg at 03/06/25 1418    calcium carbonate (TUMS) chewable tablet 1,000 mg, 1,000 mg, Oral, BID PRN, NANI Roche    cefTRIAXone (ROCEPHIN) IVPB (premix in dextrose) 1,000 mg 50 mL, 1,000 mg, Intravenous, Q24H, Linda Koroma MD, Last Rate: 100 mL/hr at 03/08/25 2014, 1,000 mg at 03/08/25 2014    chlorhexidine (PERIDEX) 0.12 % oral rinse 15 mL, 15 mL, Mouth/Throat, Q12H DANIELLE, NANI Roche, 15 mL at 03/06/25 2044    Cholecalciferol (VITAMIN D3) tablet 2,000 Units, 2,000 Units, Oral, Daily, NANI Roche, 2,000 Units at 03/06/25 1418    dextrose 5 % and sodium chloride 0.45 % infusion, 100 mL/hr, Intravenous, Continuous, Rich Elaine MD, Last Rate: 100 mL/hr at 03/09/25 0606, 100 mL/hr at 03/09/25 0606    ferrous sulfate tablet 325 mg, 325 mg, Oral, Daily With Breakfast, NANI Roche    heparin (porcine) subcutaneous injection 5,000 Units, 5,000 Units, Subcutaneous, Q8H DANIELLE, 5,000 Units at 03/09/25 0606 **AND** [CANCELED] Platelet count, , , Once, NANI Roche    ipratropium (ATROVENT) 0.02 % inhalation solution 0.5 mg, 0.5 mg, Nebulization, Q6H PRN Max 3/day, Irene Powers MD    lamoTRIgine (LaMICtal) tablet 200 mg, 200 mg, Oral, BID,  NANI Roche, 200 mg at 03/07/25 1604    levalbuterol (XOPENEX) inhalation solution 1.25 mg, 1.25 mg, Nebulization, Q6H PRN, Irene Powers MD    levETIRAcetam (KEPPRA) injection 500 mg, 500 mg, Intravenous, Q12H DANIELLE, Linda Koroma MD, 500 mg at 03/08/25 2150    magnesium sulfate 2 g/50 mL IVPB (premix) 2 g, 2 g, Intravenous, Once, Urbano Naidu MD    melatonin tablet 6 mg, 6 mg, Oral, HS, NANI Roche, 6 mg at 03/07/25 2233    midodrine (PROAMATINE) tablet 10 mg, 10 mg, Oral, TID AC, NANI Roche, 10 mg at 03/07/25 1544    mirtazapine (REMERON) tablet 7.5 mg, 7.5 mg, Oral, HS, NANI Roche, 7.5 mg at 03/07/25 2233    multivitamin stress formula tablet 1 tablet, 1 tablet, Oral, Daily, NANI Roche, 1 tablet at 03/06/25 1418    oseltamivir (TAMIFLU) capsule 30 mg, 30 mg, Oral, Q24H, NANI Roche, 30 mg at 03/06/25 0855    pantoprazole (PROTONIX) EC tablet 20 mg, 20 mg, Oral, Early Morning, NANI Roche, 20 mg at 03/06/25 0532    potassium-sodium phosphates (PHOS-NAK) packet 1 packet, 1 packet, Oral, Once, Linda Koroma MD    senna-docusate sodium (SENOKOT S) 8.6-50 mg per tablet 2 tablet, 2 tablet, Oral, Daily, NANI Roche, 2 tablet at 03/06/25 1418    tamsulosin (FLOMAX) capsule 0.4 mg, 0.4 mg, Oral, Daily With Dinner, NANI Roche, 0.4 mg at 03/07/25 1543    thiamine tablet 100 mg, 100 mg, Oral, Daily, NANI Roche, 100 mg at 03/06/25 1418    zinc sulfate (ZINCATE) capsule 220 mg, 220 mg, Oral, Daily, NANI Roche, 220 mg at 03/06/25 1418      Lab Results: I have reviewed the following results:  Results from last 7 days   Lab Units 03/09/25  0439 03/08/25  0927 03/08/25  0511 03/07/25  0632 03/07/25  0526 03/06/25  1223 03/06/25  0516 03/05/25  0444   WBC Thousand/uL 10.45* 9.69  --  7.79  --   --  6.39 8.47   HEMOGLOBIN g/dL 8.7* 8.6*  --  8.0*  --   --  7.7* 10.2*   HEMATOCRIT % 27.8* 27.9*   "--  24.0*  --   --  23.8* 30.5*   PLATELETS Thousands/uL 263 193  --  212  --   --  161 206   POTASSIUM mmol/L 3.5  --  4.1  --  3.1* 3.2* 3.7 5.1   CHLORIDE mmol/L 109*  --  111*  --  105 110* 114* 107   CO2 mmol/L 27  --  22  --  26 15* 13* 12*   BUN mg/dL 28*  --  36*  --  50* 73* 86* 109*   CREATININE mg/dL 1.50*  --  1.65*  --  2.22* 3.03* 3.68* 6.38*   CALCIUM mg/dL 8.2*  --  7.8*  --  8.2* 8.3* 8.3* 9.0   MAGNESIUM mg/dL 1.8*  --  2.1  --  1.8*  --  2.3 2.6   PHOSPHORUS mg/dL  --   --  3.0  --  1.5*  --  4.2* 4.0   ALBUMIN g/dL  --   --   --   --   --   --  2.9* 3.3*       Administrative Statements     Portions of the record may have been created with voice recognition software. Occasional wrong word or \"sound a like\" substitutions may have occurred due to the inherent limitations of voice recognition software. Read the chart carefully and recognize, using context, where substitutions have occurred.If you have any questions, please contact the dictating provider.  "

## 2025-03-09 NOTE — ASSESSMENT & PLAN NOTE
-Required Anderson catheter placement for chronic bladder outlet obstruction and bilateral severe hydronephrosis.    -Urine culture with procidentia.    -cont Rocphein

## 2025-03-09 NOTE — ASSESSMENT & PLAN NOTE
Multiple electrolyte abnormalities including hypokalemia hypomagnesemia and hypophosphatemia.    -2g IV Mg today

## 2025-03-09 NOTE — QUICK NOTE
TFTs noted.  Start methimazole 5 mg daily.  Check thyroid antibody panel.  Repeat TFTs in 2 weeks.

## 2025-03-09 NOTE — ASSESSMENT & PLAN NOTE
-CT chest: Left lower lobe airspace consolidation and bilateral multi lobar tree-in-bud infiltrates may represent pneumonia and/or aspiration. Bilateral lower lobe segmental and subsegmental endobronchial opacification, left greater than right may represent mucous plugging and/or aspiration. Stable right upper lobe cystic lesion with mural nodularity and left upper lobe spiculated lesion unchanged since November 2020. Noncontrast chest CT follow-up in 12 months is advised.   -cont Rocephin

## 2025-03-09 NOTE — ASSESSMENT & PLAN NOTE
Prerenal versus ATN in the setting of poor p.o. intake, sepsis, shock and obstructive uropathy   Prior creatinine 1.26 in November 2022 so most likely has element of chronic kidney disease  Creatinine on admission 6.38 from 3/5/2025 which is the peak  UA: Large occult blood/large leukocytes/2+ proteinuria/30-50 RBCs/30-50 WBCs/innumerable bacteria  Imaging: CT scan demonstrating severe bilateral hydroureteronephrosis extending to UVJ's severe right renal cortical thinning no loculated perinephric collection  Repeat renal ultrasound: Kidneys symmetric and normal size.  Mild to moderate dilatation of left pelvicalyceal system which is improving.  Severe hydronephrosis of the right kidney with cortical thinning likely due to chronic bladder outlet obstruction    Current creatinine: Improving to 1.50 approaching baseline    Recommend  - Continue IV fluids but switch to hypotonic fluids  - Avoid nephrotoxic agents such as NSAIDs

## 2025-03-09 NOTE — ASSESSMENT & PLAN NOTE
Patient was admitted from nursing facility with fever and hypotension  Tested positive for influenza A and urine suggestive of urinary tract infection  Admission CT with left lower lobe consolidation  Met criteria for septic shock and required vasopressor support transiently.  Started on broad-spectrum antibiotic (vancomycin and cefepime) with Tamiflu for acute influenza  Blood cultures remain negative to date.  Urine culture with procidentia  MRSA swab negative.  Vancomycin discontinued  Antibiotic switched to ceftriaxone based on sensitivity result  Blood pressure has remained stable with MAP greater than 65  Remains intermittently febrile  Cont IVF support

## 2025-03-09 NOTE — ASSESSMENT & PLAN NOTE
-POA with a creatinine of 6.36.  Prior creatinine in 2022 were 1.1-1.4.  Likely in the setting of septic shock with ATN.  Anderson catheter was placed in the emergency room.  -CT A/P on admission showing severe bilateral hydroureteronephrosis with distended bladder  -Anderson catheter was subsequently placed  -Repeat renal ultrasound shows mild to moderate dilatation of left pelvic calyceal system with severe hydronephrosis of the right kidney  -Patient seen by urology, continue Anderson catheter.  Consideration for suprapubic tube in the future  -renal following, discussed with renal today. With hypernatremia (Na 148) IVFs will be changed to more hypotonic IVFs

## 2025-03-09 NOTE — PLAN OF CARE
Problem: Potential for Falls  Goal: Patient will remain free of falls  Description: INTERVENTIONS:  - Educate patient/family on patient safety including physical limitations  - Instruct patient to call for assistance with activity   - Consult OT/PT to assist with strengthening/mobility   - Keep Call bell within reach  - Keep bed low and locked with side rails adjusted as appropriate  - Keep care items and personal belongings within reach  - Initiate and maintain comfort rounds  - Make Fall Risk Sign visible to staff  - Offer Toileting every x Hours, in advance of need  - Initiate/Maintain xalarm  - Obtain necessary fall risk management equipment: x  - Apply yellow socks and bracelet for high fall risk patients  - Consider moving patient to room near nurses station  Outcome: Progressing     Problem: Prexisting or High Potential for Compromised Skin Integrity  Goal: Skin integrity is maintained or improved  Description: INTERVENTIONS:  - Identify patients at risk for skin breakdown  - Assess and monitor skin integrity  - Assess and monitor nutrition and hydration status  - Monitor labs   - Assess for incontinence   - Turn and reposition patient  - Assist with mobility/ambulation  - Relieve pressure over bony prominences  - Avoid friction and shearing  - Provide appropriate hygiene as needed including keeping skin clean and dry  - Evaluate need for skin moisturizer/barrier cream  - Collaborate with interdisciplinary team   - Patient/family teaching  - Consider wound care consult   Outcome: Progressing     Problem: PAIN - ADULT  Goal: Verbalizes/displays adequate comfort level or baseline comfort level  Description: Interventions:  - Encourage patient to monitor pain and request assistance  - Assess pain using appropriate pain scale  - Administer analgesics based on type and severity of pain and evaluate response  - Implement non-pharmacological measures as appropriate and evaluate response  - Consider cultural and  social influences on pain and pain management  - Notify physician/advanced practitioner if interventions unsuccessful or patient reports new pain  Outcome: Progressing     Problem: INFECTION - ADULT  Goal: Absence or prevention of progression during hospitalization  Description: INTERVENTIONS:  - Assess and monitor for signs and symptoms of infection  - Monitor lab/diagnostic results  - Monitor all insertion sites, i.e. indwelling lines, tubes, and drains  - Monitor endotracheal if appropriate and nasal secretions for changes in amount and color  - [place] appropriate cooling/warming therapies per order  - Administer medications as ordered  - Instruct and encourage patient and family to use good hand hygiene technique  - Identify and instruct in appropriate isolation precautions for identified infection/condition  Outcome: Progressing  Goal: Absence of fever/infection during neutropenic period  Description: INTERVENTIONS:  - Monitor WBC    Outcome: Progressing     Problem: SAFETY ADULT  Goal: Patient will remain free of falls  Description: INTERVENTIONS:  - Educate patient/family on patient safety including physical limitations  - Instruct patient to call for assistance with activity   - Consult OT/PT to assist with strengthening/mobility   - Keep Call bell within reach  - Keep bed low and locked with side rails adjusted as appropriate  - Keep care items and personal belongings within reach  - Initiate and maintain comfort rounds  - Make Fall Risk Sign visible to staff  - Offer Toileting every x Hours, in advance of need  - Initiate/Maintain xalarm  - Obtain necessary fall risk management equipment: x  - Apply yellow socks and bracelet for high fall risk patients  - Consider moving patient to room near nurses station  Outcome: Progressing  Goal: Maintain or return to baseline ADL function  Description: INTERVENTIONS:  -  Assess patient's ability to carry out ADLs; assess patient's baseline for ADL function and  identify physical deficits which impact ability to perform ADLs (bathing, care of mouth/teeth, toileting, grooming, dressing, etc.)  - Assess/evaluate cause of self-care deficits   - Assess range of motion  - Assess patient's mobility; develop plan if impaired  - Assess patient's need for assistive devices and provide as appropriate  - Encourage maximum independence but intervene and supervise when necessary  - Involve family in performance of ADLs  - Assess for home care needs following discharge   - Consider OT consult to assist with ADL evaluation and planning for discharge  - Provide patient education as appropriate  Outcome: Progressing  Goal: Maintains/Returns to pre admission functional level  Description: INTERVENTIONS:  - Perform AM-PAC 6 Click Basic Mobility/ Daily Activity assessment daily.  - Set and communicate daily mobility goal to care team and patient/family/caregiver.   - Collaborate with rehabilitation services on mobility goals if consulted  - Perform Range of Motion x times a day.  - Reposition patient every x hours.  - Dangle patient x times a day  - Stand patient x times a day  - Ambulate patient x times a day  - Out of bed to chair x times a day   - Out of bed for meals x times a day  - Out of bed for toileting  - Record patient progress and toleration of activity level   Outcome: Progressing     Problem: DISCHARGE PLANNING  Goal: Discharge to home or other facility with appropriate resources  Description: INTERVENTIONS:  - Identify barriers to discharge w/patient and caregiver  - Arrange for needed discharge resources and transportation as appropriate  - Identify discharge learning needs (meds, wound care, etc.)  - Arrange for interpretive services to assist at discharge as needed  - Refer to Case Management Department for coordinating discharge planning if the patient needs post-hospital services based on physician/advanced practitioner order or complex needs related to functional status,  cognitive ability, or social support system  Outcome: Progressing     Problem: Knowledge Deficit  Goal: Patient/family/caregiver demonstrates understanding of disease process, treatment plan, medications, and discharge instructions  Description: Complete learning assessment and assess knowledge base.  Interventions:  - Provide teaching at level of understanding  - Provide teaching via preferred learning methods  Outcome: Progressing     Problem: Nutrition/Hydration-ADULT  Goal: Nutrient/Hydration intake appropriate for improving, restoring or maintaining nutritional needs  Description: Monitor and assess patient's nutrition/hydration status for malnutrition. Collaborate with interdisciplinary team and initiate plan and interventions as ordered.  Monitor patient's weight and dietary intake as ordered or per policy. Utilize nutrition screening tool and intervene as necessary. Determine patient's food preferences and provide high-protein, high-caloric foods as appropriate.     INTERVENTIONS:  - Monitor oral intake, urinary output, labs, and treatment plans  - Assess nutrition and hydration status and recommend course of action  - Evaluate amount of meals eaten  - Assist patient with eating if necessary   - Allow adequate time for meals  - Recommend/ encourage appropriate diets, oral nutritional supplements, and vitamin/mineral supplements  - Order, calculate, and assess calorie counts as needed  - Recommend, monitor, and adjust tube feedings and TPN/PPN based on assessed needs  - Assess need for intravenous fluids  - Provide specific nutrition/hydration education as appropriate  - Include patient/family/caregiver in decisions related to nutrition  Outcome: Progressing

## 2025-03-09 NOTE — ASSESSMENT & PLAN NOTE
Malnutrition Findings:   Adult Malnutrition type: Chronic illness  Adult Degree of Malnutrition: Other severe protein calorie malnutrition                     360 Statement: Pt presents with severe protein calorie malnutrition as evidenced by sunken orbitals, b/l temporal depressions and visble clavicle bone. Treat with diet and supplements BID.    BMI Findings:           Body mass index is 25.44 kg/m².   Encourage oral intake.  Nutrition input appreciated

## 2025-03-09 NOTE — PLAN OF CARE
Problem: Potential for Falls  Goal: Patient will remain free of falls  Description: INTERVENTIONS:  - Educate patient/family on patient safety including physical limitations  - Instruct patient to call for assistance with activity   - Consult OT/PT to assist with strengthening/mobility   - Keep Call bell within reach  - Keep bed low and locked with side rails adjusted as appropriate  - Keep care items and personal belongings within reach  - Initiate and maintain comfort rounds  - Make Fall Risk Sign visible to staff  - Offer Toileting every x Hours, in advance of need  - Initiate/Maintain bedalarm  - Obtain necessary fall risk management equipment: x  - Apply yellow socks and bracelet for high fall risk patients  - Consider moving patient to room near nurses station  Outcome: Progressing     Problem: Prexisting or High Potential for Compromised Skin Integrity  Goal: Skin integrity is maintained or improved  Description: INTERVENTIONS:  - Identify patients at risk for skin breakdown  - Assess and monitor skin integrity  - Assess and monitor nutrition and hydration status  - Monitor labs   - Assess for incontinence   - Turn and reposition patient  - Assist with mobility/ambulation  - Relieve pressure over bony prominences  - Avoid friction and shearing  - Provide appropriate hygiene as needed including keeping skin clean and dry  - Evaluate need for skin moisturizer/barrier cream  - Collaborate with interdisciplinary team   - Patient/family teaching  - Consider wound care consult   Outcome: Progressing     Problem: PAIN - ADULT  Goal: Verbalizes/displays adequate comfort level or baseline comfort level  Description: Interventions:  - Encourage patient to monitor pain and request assistance  - Assess pain using appropriate pain scale  - Administer analgesics based on type and severity of pain and evaluate response  - Implement non-pharmacological measures as appropriate and evaluate response  - Consider cultural and  social influences on pain and pain management  - Notify physician/advanced practitioner if interventions unsuccessful or patient reports new pain  Outcome: Progressing     Problem: INFECTION - ADULT  Goal: Absence or prevention of progression during hospitalization  Description: INTERVENTIONS:  - Assess and monitor for signs and symptoms of infection  - Monitor lab/diagnostic results  - Monitor all insertion sites, i.e. indwelling lines, tubes, and drains  - Monitor endotracheal if appropriate and nasal secretions for changes in amount and color  - [place] appropriate cooling/warming therapies per order  - Administer medications as ordered  - Instruct and encourage patient and family to use good hand hygiene technique  - Identify and instruct in appropriate isolation precautions for identified infection/condition  Outcome: Progressing  Goal: Absence of fever/infection during neutropenic period  Description: INTERVENTIONS:  - Monitor WBC    Outcome: Progressing     Problem: SAFETY ADULT  Goal: Patient will remain free of falls  Description: INTERVENTIONS:  - Educate patient/family on patient safety including physical limitations  - Instruct patient to call for assistance with activity   - Consult OT/PT to assist with strengthening/mobility   - Keep Call bell within reach  - Keep bed low and locked with side rails adjusted as appropriate  - Keep care items and personal belongings within reach  - Initiate and maintain comfort rounds  - Make Fall Risk Sign visible to staff  - Offer Toileting every x Hours, in advance of need  - Initiate/Maintain bedalarm  - Obtain necessary fall risk management equipment: x  - Apply yellow socks and bracelet for high fall risk patients  - Consider moving patient to room near nurses station  Outcome: Progressing  Goal: Maintain or return to baseline ADL function  Description: INTERVENTIONS:  -  Assess patient's ability to carry out ADLs; assess patient's baseline for ADL function and  identify physical deficits which impact ability to perform ADLs (bathing, care of mouth/teeth, toileting, grooming, dressing, etc.)  - Assess/evaluate cause of self-care deficits   - Assess range of motion  - Assess patient's mobility; develop plan if impaired  - Assess patient's need for assistive devices and provide as appropriate  - Encourage maximum independence but intervene and supervise when necessary  - Involve family in performance of ADLs  - Assess for home care needs following discharge   - Consider OT consult to assist with ADL evaluation and planning for discharge  - Provide patient education as appropriate  Outcome: Progressing  Goal: Maintains/Returns to pre admission functional level  Description: INTERVENTIONS:  - Perform AM-PAC 6 Click Basic Mobility/ Daily Activity assessment daily.  - Set and communicate daily mobility goal to care team and patient/family/caregiver.   - Collaborate with rehabilitation services on mobility goals if consulted  - Perform Range of Motion x times a day.  - Reposition patient every x hours.  - Dangle patient x times a day  - Stand patient x times a day  - Ambulate patient x times a day  - Out of bed to chair x times a day   - Out of bed for meals x times a day  - Out of bed for toileting  - Record patient progress and toleration of activity level   Outcome: Progressing     Problem: DISCHARGE PLANNING  Goal: Discharge to home or other facility with appropriate resources  Description: INTERVENTIONS:  - Identify barriers to discharge w/patient and caregiver  - Arrange for needed discharge resources and transportation as appropriate  - Identify discharge learning needs (meds, wound care, etc.)  - Arrange for interpretive services to assist at discharge as needed  - Refer to Case Management Department for coordinating discharge planning if the patient needs post-hospital services based on physician/advanced practitioner order or complex needs related to functional status,  cognitive ability, or social support system  Outcome: Progressing     Problem: Knowledge Deficit  Goal: Patient/family/caregiver demonstrates understanding of disease process, treatment plan, medications, and discharge instructions  Description: Complete learning assessment and assess knowledge base.  Interventions:  - Provide teaching at level of understanding  - Provide teaching via preferred learning methods  Outcome: Progressing     Problem: Nutrition/Hydration-ADULT  Goal: Nutrient/Hydration intake appropriate for improving, restoring or maintaining nutritional needs  Description: Monitor and assess patient's nutrition/hydration status for malnutrition. Collaborate with interdisciplinary team and initiate plan and interventions as ordered.  Monitor patient's weight and dietary intake as ordered or per policy. Utilize nutrition screening tool and intervene as necessary. Determine patient's food preferences and provide high-protein, high-caloric foods as appropriate.     INTERVENTIONS:  - Monitor oral intake, urinary output, labs, and treatment plans  - Assess nutrition and hydration status and recommend course of action  - Evaluate amount of meals eaten  - Assist patient with eating if necessary   - Allow adequate time for meals  - Recommend/ encourage appropriate diets, oral nutritional supplements, and vitamin/mineral supplements  - Order, calculate, and assess calorie counts as needed  - Recommend, monitor, and adjust tube feedings and TPN/PPN based on assessed needs  - Assess need for intravenous fluids  - Provide specific nutrition/hydration education as appropriate  - Include patient/family/caregiver in decisions related to nutrition  Outcome: Progressing

## 2025-03-10 ENCOUNTER — APPOINTMENT (INPATIENT)
Dept: NON INVASIVE DIAGNOSTICS | Facility: HOSPITAL | Age: 60
DRG: 720 | End: 2025-03-10
Payer: COMMERCIAL

## 2025-03-10 PROBLEM — E87.20 METABOLIC ACIDOSIS: Status: RESOLVED | Noted: 2025-03-05 | Resolved: 2025-03-10

## 2025-03-10 LAB
ANION GAP SERPL CALCULATED.3IONS-SCNC: 9 MMOL/L (ref 4–13)
BACTERIA BLD CULT: NORMAL
BACTERIA BLD CULT: NORMAL
BUN SERPL-MCNC: 22 MG/DL (ref 5–25)
CALCIUM SERPL-MCNC: 7.9 MG/DL (ref 8.4–10.2)
CHLORIDE SERPL-SCNC: 110 MMOL/L (ref 96–108)
CO2 SERPL-SCNC: 24 MMOL/L (ref 21–32)
CREAT SERPL-MCNC: 1.27 MG/DL (ref 0.6–1.3)
ERYTHROCYTE [DISTWIDTH] IN BLOOD BY AUTOMATED COUNT: 13.1 % (ref 11.6–15.1)
FERRITIN SERPL-MCNC: 526 NG/ML (ref 24–336)
GFR SERPL CREATININE-BSD FRML MDRD: 61 ML/MIN/1.73SQ M
GLUCOSE SERPL-MCNC: 104 MG/DL (ref 65–140)
GLUCOSE SERPL-MCNC: 104 MG/DL (ref 65–140)
GLUCOSE SERPL-MCNC: 128 MG/DL (ref 65–140)
GLUCOSE SERPL-MCNC: 173 MG/DL (ref 65–140)
GLUCOSE SERPL-MCNC: 96 MG/DL (ref 65–140)
HCT VFR BLD AUTO: 24.7 % (ref 36.5–49.3)
HGB BLD-MCNC: 7.8 G/DL (ref 12–17)
IRON SERPL-MCNC: 10 UG/DL (ref 50–212)
LAMOTRIGINE SERPL-MCNC: 10.8 UG/ML (ref 2–20)
LAMOTRIGINE SERPL-MCNC: 12.1 UG/ML (ref 2–20)
MAGNESIUM SERPL-MCNC: 1.9 MG/DL (ref 1.9–2.7)
MCH RBC QN AUTO: 31.2 PG (ref 26.8–34.3)
MCHC RBC AUTO-ENTMCNC: 31.6 G/DL (ref 31.4–37.4)
MCV RBC AUTO: 99 FL (ref 82–98)
PHOSPHATE SERPL-MCNC: 2.2 MG/DL (ref 2.3–4.1)
PLATELET # BLD AUTO: 226 THOUSANDS/UL (ref 149–390)
PMV BLD AUTO: 10.3 FL (ref 8.9–12.7)
POTASSIUM SERPL-SCNC: 3.3 MMOL/L (ref 3.5–5.3)
RBC # BLD AUTO: 2.5 MILLION/UL (ref 3.88–5.62)
SODIUM SERPL-SCNC: 143 MMOL/L (ref 135–147)
TRANSFERRIN SERPL-MCNC: <75 MG/DL (ref 203–362)
WBC # BLD AUTO: 8.11 THOUSAND/UL (ref 4.31–10.16)

## 2025-03-10 PROCEDURE — 85027 COMPLETE CBC AUTOMATED: CPT | Performed by: HOSPITALIST

## 2025-03-10 PROCEDURE — 83540 ASSAY OF IRON: CPT | Performed by: HOSPITALIST

## 2025-03-10 PROCEDURE — 93971 EXTREMITY STUDY: CPT

## 2025-03-10 PROCEDURE — 82948 REAGENT STRIP/BLOOD GLUCOSE: CPT

## 2025-03-10 PROCEDURE — 86800 THYROGLOBULIN ANTIBODY: CPT | Performed by: HOSPITALIST

## 2025-03-10 PROCEDURE — 99232 SBSQ HOSP IP/OBS MODERATE 35: CPT | Performed by: HOSPITALIST

## 2025-03-10 PROCEDURE — 83735 ASSAY OF MAGNESIUM: CPT | Performed by: HOSPITALIST

## 2025-03-10 PROCEDURE — 84100 ASSAY OF PHOSPHORUS: CPT | Performed by: HOSPITALIST

## 2025-03-10 PROCEDURE — 86376 MICROSOMAL ANTIBODY EACH: CPT | Performed by: HOSPITALIST

## 2025-03-10 PROCEDURE — 83550 IRON BINDING TEST: CPT | Performed by: HOSPITALIST

## 2025-03-10 PROCEDURE — 80048 BASIC METABOLIC PNL TOTAL CA: CPT | Performed by: HOSPITALIST

## 2025-03-10 PROCEDURE — 82728 ASSAY OF FERRITIN: CPT | Performed by: HOSPITALIST

## 2025-03-10 PROCEDURE — 93971 EXTREMITY STUDY: CPT | Performed by: SURGERY

## 2025-03-10 PROCEDURE — 99232 SBSQ HOSP IP/OBS MODERATE 35: CPT | Performed by: INTERNAL MEDICINE

## 2025-03-10 PROCEDURE — 92610 EVALUATE SWALLOWING FUNCTION: CPT

## 2025-03-10 RX ORDER — DEXTROSE MONOHYDRATE, SODIUM CHLORIDE, AND POTASSIUM CHLORIDE 50; 1.49; 4.5 G/1000ML; G/1000ML; G/1000ML
75 INJECTION, SOLUTION INTRAVENOUS CONTINUOUS
Status: DISCONTINUED | OUTPATIENT
Start: 2025-03-10 | End: 2025-03-13

## 2025-03-10 RX ORDER — POTASSIUM CHLORIDE 1500 MG/1
40 TABLET, EXTENDED RELEASE ORAL ONCE
Status: COMPLETED | OUTPATIENT
Start: 2025-03-10 | End: 2025-03-10

## 2025-03-10 RX ORDER — POTASSIUM CHLORIDE 1500 MG/1
20 TABLET, EXTENDED RELEASE ORAL ONCE
Status: COMPLETED | OUTPATIENT
Start: 2025-03-10 | End: 2025-03-10

## 2025-03-10 RX ORDER — ACETAMINOPHEN 10 MG/ML
1000 INJECTION, SOLUTION INTRAVENOUS ONCE
Status: DISCONTINUED | OUTPATIENT
Start: 2025-03-10 | End: 2025-03-10

## 2025-03-10 RX ADMIN — POTASSIUM & SODIUM PHOSPHATES POWDER PACK 280-160-250 MG 1 PACKET: 280-160-250 PACK at 16:02

## 2025-03-10 RX ADMIN — MIDODRINE HYDROCHLORIDE 10 MG: 5 TABLET ORAL at 15:19

## 2025-03-10 RX ADMIN — ACETAMINOPHEN 650 MG: 325 TABLET, FILM COATED ORAL at 23:12

## 2025-03-10 RX ADMIN — MIRTAZAPINE 7.5 MG: 15 TABLET, FILM COATED ORAL at 21:05

## 2025-03-10 RX ADMIN — Medication 6 MG: at 21:05

## 2025-03-10 RX ADMIN — TAMSULOSIN HYDROCHLORIDE 0.4 MG: 0.4 CAPSULE ORAL at 16:02

## 2025-03-10 RX ADMIN — ZINC SULFATE 220 MG (50 MG) CAPSULE 220 MG: CAPSULE at 09:59

## 2025-03-10 RX ADMIN — DEXTROSE, SODIUM CHLORIDE, AND POTASSIUM CHLORIDE 75 ML/HR: 5; .45; .15 INJECTION INTRAVENOUS at 15:19

## 2025-03-10 RX ADMIN — DEXTROSE AND SODIUM CHLORIDE 100 ML/HR: 5; .2 INJECTION, SOLUTION INTRAVENOUS at 01:24

## 2025-03-10 RX ADMIN — DEXTROSE AND SODIUM CHLORIDE 100 ML/HR: 5; .2 INJECTION, SOLUTION INTRAVENOUS at 12:04

## 2025-03-10 RX ADMIN — CHLORHEXIDINE GLUCONATE 15 ML: 1.2 RINSE ORAL at 21:05

## 2025-03-10 RX ADMIN — LAMOTRIGINE 200 MG: 100 TABLET ORAL at 18:33

## 2025-03-10 RX ADMIN — METHIMAZOLE 5 MG: 5 TABLET ORAL at 09:59

## 2025-03-10 RX ADMIN — Medication 100 MG: at 09:59

## 2025-03-10 RX ADMIN — Medication 500 MG: at 01:24

## 2025-03-10 RX ADMIN — Medication 2000 UNITS: at 09:59

## 2025-03-10 RX ADMIN — B-COMPLEX W/ C & FOLIC ACID TAB 1 TABLET: TAB at 09:59

## 2025-03-10 RX ADMIN — CEFTRIAXONE 1000 MG: 1 INJECTION, SOLUTION INTRAVENOUS at 19:58

## 2025-03-10 RX ADMIN — POTASSIUM CHLORIDE 20 MEQ: 1500 TABLET, EXTENDED RELEASE ORAL at 15:19

## 2025-03-10 RX ADMIN — LEVETIRACETAM 500 MG: 500 INJECTION, SOLUTION INTRAVENOUS at 09:59

## 2025-03-10 RX ADMIN — POTASSIUM CHLORIDE 40 MEQ: 1500 TABLET, EXTENDED RELEASE ORAL at 09:59

## 2025-03-10 RX ADMIN — LAMOTRIGINE 200 MG: 100 TABLET ORAL at 09:59

## 2025-03-10 RX ADMIN — CHLORHEXIDINE GLUCONATE 15 ML: 1.2 RINSE ORAL at 12:13

## 2025-03-10 RX ADMIN — LEVETIRACETAM 500 MG: 500 INJECTION, SOLUTION INTRAVENOUS at 21:02

## 2025-03-10 RX ADMIN — ASPIRIN 81 MG CHEWABLE TABLET 81 MG: 81 TABLET CHEWABLE at 09:59

## 2025-03-10 RX ADMIN — HEPARIN SODIUM 5000 UNITS: 5000 INJECTION INTRAVENOUS; SUBCUTANEOUS at 05:45

## 2025-03-10 RX ADMIN — SENNOSIDES AND DOCUSATE SODIUM 2 TABLET: 50; 8.6 TABLET ORAL at 09:59

## 2025-03-10 RX ADMIN — HEPARIN SODIUM 5000 UNITS: 5000 INJECTION INTRAVENOUS; SUBCUTANEOUS at 16:02

## 2025-03-10 RX ADMIN — HEPARIN SODIUM 5000 UNITS: 5000 INJECTION INTRAVENOUS; SUBCUTANEOUS at 21:04

## 2025-03-10 NOTE — WOUND OSTOMY CARE
Consult Note - Wound   Trevor Waldrop 60 y.o. male MRN: [id number]  Unit/Bed#: -01 Encounter: [id number]      History and Present Illness:  60 year old male admitted to hospital from care facility with fever, hypotension and flu-like symptoms. Significant medical history includes seizures, anemia, bipolar disorder, bilateral lower leg amputations and JOHNY.    Assessment Findings:   Patient resting in bed on a standard mattress.  Alert and agreeable to assessment.  Reports pain in his sacral area.  There are turning wedges in place. Assist X 2 to turn. He is wheelchair bound.  Incontinent of bowel and bladder, young in place.  Dysphagia 3, dental soft diet, nutrition following, supplements ordered.      POA Deep Tissue Pressure Injury Sacrum/Buttocks-Dry, Deep Purple and pink non blanchable erythema area.  The gabrielle wound area is dry pink erythema.  No tissue loss or drainage at this point.  Possibly still evolving.  POA Deep Tissue Pressure Injury Upper Right Posterior Thigh-Dry intact, non-blanchable purple erythema at the upper thigh fold with buttock.  Gabrielle wound area ils dry and intact, no drainage.  POA Deep Tissue Pressure Injury Upper Left Posterior -Dry, intact, non-blanchable purple erythema at the upper thigh fold with buttock.  Gabrielle wound area ils dry and intact, no drainage.    See flowsheet for wound details.    Wound Care Plan:   1-Sacrum and bilateral upper thighs-Cleanse with soap and water, pat dry.  Apply three foams to the affected areas.  Finesse with T for treatment and change every other day or as needed for soilage.acrum and bilateral upper thighs-Cleanse with soap and water, pat dry.  Apply three foams to the affected areas.  Finesse with T for treatment and change every other day or as needed for soilage.  2-Offloading air cushion in chair when out of bed.  3-Apply moisturizing skin cream to body daily and as needed.  4-Turn/reposition every 2 hours while in bed and weight shift frequently while  in chair for pressure re-distribution on skin.     Wound care team to follow.  Plan of care reviewed with primary RN.    Wound 03/07/25 Pressure Injury Buttocks (Active)   Wound Image   03/10/25 1116   Pressure Injury Stage DTPI 03/10/25 1116   Treatments Cleansed;Site care 03/10/25 1116   Wound packed? No 03/10/25 1116   Dressing Changed Changed 03/10/25 1116   Patient Tolerance Tolerated well 03/10/25 1116       Wound 03/10/25 Pressure Injury Thigh Left;Posterior;Proximal;Upper (Active)   Wound Image   03/10/25 1116   Wound Description Intact;Non-blanchable erythema;Light purple 03/10/25 1116   Pressure Injury Stage DTPI 03/10/25 1116   Gabrielle-wound Assessment Dry;Intact 03/10/25 1116   Treatments Cleansed;Site care 03/10/25 1116   Dressing Foam, Silicon (eg. Allevyn, etc) 03/10/25 1116   Wound packed? No 03/10/25 1116   Dressing Changed Changed 03/10/25 1116   Patient Tolerance Tolerated well 03/10/25 1116   Dressing Status Clean;Dry;Intact 03/10/25 1116       Wound 03/10/25 Pressure Injury Thigh Posterior;Proximal;Right;Upper (Active)   Wound Image   03/10/25 1116   Wound Description Dry;Light purple;Non-blanchable erythema 03/10/25 1116   Pressure Injury Stage DTPI 03/10/25 1116   Gabrielle-wound Assessment Dry;Intact 03/10/25 1116   Drainage Amount None 03/10/25 1116   Treatments Cleansed;Irrigation with NSS 03/10/25 1116   Dressing Foam, Silicon (eg. Allevyn, etc) 03/10/25 1116   Wound packed? No 03/10/25 1116   Dressing Changed Changed 03/10/25 1116   Patient Tolerance Tolerated well 03/10/25 1116   Dressing Status Clean;Dry;Intact 03/10/25 1116         Vale Pandey, RN, BSN

## 2025-03-10 NOTE — DISCHARGE INSTR - OTHER ORDERS
Wound Care Plan:   1-Sacrum and bilateral upper thighs-Cleanse with soap and water, pat dry.  Apply three foams to the affected areas.  Finesse with T for treatment and change every other day or as needed for soilage.acrum and bilateral upper thighs-Cleanse with soap and water, pat dry.  Apply three foams to the affected areas.  Finesse with T for treatment and change every other day or as needed for soilage.  2-Offloading air cushion in chair when out of bed.  3-Apply moisturizing skin cream to body daily and as needed.  4-Turn/reposition every 2 hours while in bed and weight shift frequently while in chair for pressure re-distribution on skin.

## 2025-03-10 NOTE — ASSESSMENT & PLAN NOTE
Blood pressure is improved.  Continue midodrine.  May wean as tolerated.  Continue IV fluids.  Management of thyroid supplementation per primary care team.

## 2025-03-10 NOTE — PLAN OF CARE
Problem: Potential for Falls  Goal: Patient will remain free of falls  Description: INTERVENTIONS:  - Educate patient/family on patient safety including physical limitations  - Instruct patient to call for assistance with activity   - Consult OT/PT to assist with strengthening/mobility   - Keep Call bell within reach  - Keep bed low and locked with side rails adjusted as appropriate  - Keep care items and personal belongings within reach  - Initiate and maintain comfort rounds  - Make Fall Risk Sign visible to staff  - Offer Toileting every x Hours, in advance of need  - Initiate/Maintain bedalarm  - Obtain necessary fall risk management equipment: x  - Apply yellow socks and bracelet for high fall risk patients  - Consider moving patient to room near nurses station  Outcome: Progressing     Problem: Prexisting or High Potential for Compromised Skin Integrity  Goal: Skin integrity is maintained or improved  Description: INTERVENTIONS:  - Identify patients at risk for skin breakdown  - Assess and monitor skin integrity  - Assess and monitor nutrition and hydration status  - Monitor labs   - Assess for incontinence   - Turn and reposition patient  - Assist with mobility/ambulation  - Relieve pressure over bony prominences  - Avoid friction and shearing  - Provide appropriate hygiene as needed including keeping skin clean and dry  - Evaluate need for skin moisturizer/barrier cream  - Collaborate with interdisciplinary team   - Patient/family teaching  - Consider wound care consult   Outcome: Progressing     Problem: PAIN - ADULT  Goal: Verbalizes/displays adequate comfort level or baseline comfort level  Description: Interventions:  - Encourage patient to monitor pain and request assistance  - Assess pain using appropriate pain scale  - Administer analgesics based on type and severity of pain and evaluate response  - Implement non-pharmacological measures as appropriate and evaluate response  - Consider cultural and  social influences on pain and pain management  - Notify physician/advanced practitioner if interventions unsuccessful or patient reports new pain  Outcome: Progressing     Problem: INFECTION - ADULT  Goal: Absence or prevention of progression during hospitalization  Description: INTERVENTIONS:  - Assess and monitor for signs and symptoms of infection  - Monitor lab/diagnostic results  - Monitor all insertion sites, i.e. indwelling lines, tubes, and drains  - Monitor endotracheal if appropriate and nasal secretions for changes in amount and color  - [place] appropriate cooling/warming therapies per order  - Administer medications as ordered  - Instruct and encourage patient and family to use good hand hygiene technique  - Identify and instruct in appropriate isolation precautions for identified infection/condition  Outcome: Progressing  Goal: Absence of fever/infection during neutropenic period  Description: INTERVENTIONS:  - Monitor WBC    Outcome: Progressing     Problem: SAFETY ADULT  Goal: Patient will remain free of falls  Description: INTERVENTIONS:  - Educate patient/family on patient safety including physical limitations  - Instruct patient to call for assistance with activity   - Consult OT/PT to assist with strengthening/mobility   - Keep Call bell within reach  - Keep bed low and locked with side rails adjusted as appropriate  - Keep care items and personal belongings within reach  - Initiate and maintain comfort rounds  - Make Fall Risk Sign visible to staff  - Offer Toileting every x Hours, in advance of need  - Initiate/Maintain bedalarm  - Obtain necessary fall risk management equipment: x  - Apply yellow socks and bracelet for high fall risk patients  - Consider moving patient to room near nurses station  Outcome: Progressing  Goal: Maintain or return to baseline ADL function  Description: INTERVENTIONS:  -  Assess patient's ability to carry out ADLs; assess patient's baseline for ADL function and  identify physical deficits which impact ability to perform ADLs (bathing, care of mouth/teeth, toileting, grooming, dressing, etc.)  - Assess/evaluate cause of self-care deficits   - Assess range of motion  - Assess patient's mobility; develop plan if impaired  - Assess patient's need for assistive devices and provide as appropriate  - Encourage maximum independence but intervene and supervise when necessary  - Involve family in performance of ADLs  - Assess for home care needs following discharge   - Consider OT consult to assist with ADL evaluation and planning for discharge  - Provide patient education as appropriate  Outcome: Progressing  Goal: Maintains/Returns to pre admission functional level  Description: INTERVENTIONS:  - Perform AM-PAC 6 Click Basic Mobility/ Daily Activity assessment daily.  - Set and communicate daily mobility goal to care team and patient/family/caregiver.   - Collaborate with rehabilitation services on mobility goals if consulted  - Perform Range of Motion x times a day.  - Reposition patient every x hours.  - Dangle patient x times a day  - Stand patient x times a day  - Ambulate patient x times a day  - Out of bed to chair x times a day   - Out of bed for meals x times a day  - Out of bed for toileting  - Record patient progress and toleration of activity level   Outcome: Progressing     Problem: DISCHARGE PLANNING  Goal: Discharge to home or other facility with appropriate resources  Description: INTERVENTIONS:  - Identify barriers to discharge w/patient and caregiver  - Arrange for needed discharge resources and transportation as appropriate  - Identify discharge learning needs (meds, wound care, etc.)  - Arrange for interpretive services to assist at discharge as needed  - Refer to Case Management Department for coordinating discharge planning if the patient needs post-hospital services based on physician/advanced practitioner order or complex needs related to functional status,  cognitive ability, or social support system  Outcome: Progressing     Problem: Knowledge Deficit  Goal: Patient/family/caregiver demonstrates understanding of disease process, treatment plan, medications, and discharge instructions  Description: Complete learning assessment and assess knowledge base.  Interventions:  - Provide teaching at level of understanding  - Provide teaching via preferred learning methods  Outcome: Progressing     Problem: Nutrition/Hydration-ADULT  Goal: Nutrient/Hydration intake appropriate for improving, restoring or maintaining nutritional needs  Description: Monitor and assess patient's nutrition/hydration status for malnutrition. Collaborate with interdisciplinary team and initiate plan and interventions as ordered.  Monitor patient's weight and dietary intake as ordered or per policy. Utilize nutrition screening tool and intervene as necessary. Determine patient's food preferences and provide high-protein, high-caloric foods as appropriate.     INTERVENTIONS:  - Monitor oral intake, urinary output, labs, and treatment plans  - Assess nutrition and hydration status and recommend course of action  - Evaluate amount of meals eaten  - Assist patient with eating if necessary   - Allow adequate time for meals  - Recommend/ encourage appropriate diets, oral nutritional supplements, and vitamin/mineral supplements  - Order, calculate, and assess calorie counts as needed  - Recommend, monitor, and adjust tube feedings and TPN/PPN based on assessed needs  - Assess need for intravenous fluids  - Provide specific nutrition/hydration education as appropriate  - Include patient/family/caregiver in decisions related to nutrition  Outcome: Progressing

## 2025-03-10 NOTE — ASSESSMENT & PLAN NOTE
Prerenal versus ATN in the setting of poor p.o. intake, sepsis, shock and obstructive uropathy.  Prior creatinine 1.26 in November 2022 so most likely has element of chronic kidney disease.  Creatinine on admission 6.38 from 3/5/2025 which is the peak.  Repeat creatinine improved to 1.2.  UA: Large occult blood/large leukocytes/2+ proteinuria/30-50 RBCs/30-50 WBCs/innumerable bacteria.  Imaging: CT scan demonstrating severe bilateral hydroureteronephrosis extending to UVJ's severe right renal cortical thinning no loculated perinephric collection.  Repeat renal ultrasound: Kidneys symmetric and normal size.  Mild to moderate dilatation of left pelvicalyceal system which is improving.  Severe hydronephrosis of the right kidney with cortical thinning likely due to chronic bladder outlet obstruction.    Recommend  - Continue IV fluids.  - Avoid nephrotoxic agents such as NSAIDs.

## 2025-03-10 NOTE — PROGRESS NOTES
Progress Note - Nephrology   Name: Trevor Waldrop 60 y.o. male I MRN: [id number]  Unit/Bed#: -01 I Date of Admission: 3/5/2025   Date of Service: 3/10/2025 I Hospital Day: 5     Assessment & Plan  JOHNY (acute kidney injury) (HCC)  Prerenal versus ATN in the setting of poor p.o. intake, sepsis, shock and obstructive uropathy.  Prior creatinine 1.26 in November 2022 so most likely has element of chronic kidney disease.  Creatinine on admission 6.38 from 3/5/2025 which is the peak.  Repeat creatinine improved to 1.2.  UA: Large occult blood/large leukocytes/2+ proteinuria/30-50 RBCs/30-50 WBCs/innumerable bacteria.  Imaging: CT scan demonstrating severe bilateral hydroureteronephrosis extending to UVJ's severe right renal cortical thinning no loculated perinephric collection.  Repeat renal ultrasound: Kidneys symmetric and normal size.  Mild to moderate dilatation of left pelvicalyceal system which is improving.  Severe hydronephrosis of the right kidney with cortical thinning likely due to chronic bladder outlet obstruction.    Recommend  - Continue IV fluids.  - Avoid nephrotoxic agents such as NSAIDs.  Other hydronephrosis  Repeat renal ultrasound showed improvement in left hydronephrosis but persistent severe right hydronephrosis likely related to bladder outlet obstruction.  Appreciate urology consultation: Continue Anderson catheter for now per their recommendation and trend renal function; get interval renal ultrasound in a few weeks to determine bladder is decompressed?  Potential SPT.  Metabolic acidosis (Resolved: 3/10/2025)  Resolved.  Septic shock (HCC)  Likely related to UTI/flu/pneumonia.  Urine culture positive for providencia rettgeri.   Workup for other causes: Cortisol acceptable 26.9; low TSH increased free T4/EF 55%.  Hypertension  Blood pressure is improved.  Continue midodrine.  May wean as tolerated.  Continue IV fluids.  Management of thyroid supplementation per primary care team.  Influenza  A  Tamiflu per primary team.  Electrolyte abnormality  Hypernatremia will lower sodium content and IV fluids.  Hypokalemia, mag level normal.  Replace as needed.  Hypophosphatemia, will provide phosphorus supplementation.  Encourage oral intake.  Left lower lobe pneumonia  On broad-spectrum antibiotics per critical care medicine  Acute cystitis with hematuria  On antibiotics awaiting cultures per critical care medicine  Seizure disorder (HCC)  Primary service.  On Lamictal 200 mg twice daily and Keppra 500 mg every 12 hours.  Anemia  Continue to monitor and transfuse as needed for hemoglobin less than 7.0.  Iron panel is pending.  May continue oral iron.    Plan:  -JOHNY, creatinine has returned to baseline.  Currently on IV fluids.  -Hydronephrosis, continue Anderson catheter per urology team.  -Hypertension, blood pressure improved.  May wean midodrine as tolerated.  -Influenza, management per primary care team.  -Hypernatremia, resolved with administration of hypotonic fluids.  -Hypophosphatemia, receiving supplementation today, encourage oral intake, continue nutritional supplements, repeat level in AM.  -Pneumonia/acute cystitis, antibiotics per primary care team.  -Anemia, iron panel is pending, may continue oral iron.    Subjective     Resting in bed. Appears comfortable and without any apparent distress.    Objective :  Temp:  [99.4 °F (37.4 °C)-100.5 °F (38.1 °C)] 99.8 °F (37.7 °C)  HR:  [] 98  BP: (107-130)/(70-76) 113/74  Resp:  [18] 18  SpO2:  [96 %-98 %] 97 %  O2 Device: None (Room air)    Current Weight: Weight - Scale: 43.4 kg (95 lb 10.9 oz) (Notified Dr Church at rounds of 6 pound weight loss)  First Weight: Weight - Scale: 50 kg (110 lb 3.7 oz)  I/O         03/08 0701  03/09 0700 03/09 0701  03/10 0700 03/10 0701  03/11 0700    P.O. 0 0 720    I.V. (mL/kg)  0 (0)     IV Piggyback       Total Intake(mL/kg) 0 (0) 0 (0) 720 (16.6)    Urine (mL/kg/hr) 1000 (0.9) 1175 (1.1)     Stool       Total Output  1000 1175     Net -1000 -1175 +720                 Physical Exam  Vitals reviewed.   Constitutional:       Appearance: He is ill-appearing.      Comments: Thin, frail    HENT:      Head: Normocephalic.   Cardiovascular:      Heart sounds: Normal heart sounds.   Pulmonary:      Breath sounds: Normal breath sounds.   Musculoskeletal:      Right lower leg: No edema.      Left lower leg: No edema.   Skin:     General: Skin is warm and dry.   Neurological:      Mental Status: He is alert. Mental status is at baseline.   Psychiatric:         Mood and Affect: Mood normal.         Medications:    Current Facility-Administered Medications:     acetaminophen (TYLENOL) tablet 650 mg, 650 mg, Oral, Q4H PRN, NANI Roche, 650 mg at 03/06/25 1149    aspirin chewable tablet 81 mg, 81 mg, Oral, Daily, NANI Roche, 81 mg at 03/10/25 0959    calcium carbonate (TUMS) chewable tablet 1,000 mg, 1,000 mg, Oral, BID PRN, NANI Roche    cefTRIAXone (ROCEPHIN) IVPB (premix in dextrose) 1,000 mg 50 mL, 1,000 mg, Intravenous, Q24H, Linda Koroma MD, Last Rate: 100 mL/hr at 03/09/25 2053, 1,000 mg at 03/09/25 2053    chlorhexidine (PERIDEX) 0.12 % oral rinse 15 mL, 15 mL, Mouth/Throat, Q12H DANIELLE, NANI Roche, 15 mL at 03/06/25 2044    Cholecalciferol (VITAMIN D3) tablet 2,000 Units, 2,000 Units, Oral, Daily, ANNI Roche, 2,000 Units at 03/10/25 0959    dextrose 5 % and sodium chloride 0.2 % infusion, 100 mL/hr, Intravenous, Continuous, Rich Elaine MD, Last Rate: 100 mL/hr at 03/10/25 0124, 100 mL/hr at 03/10/25 0124    ferrous sulfate tablet 325 mg, 325 mg, Oral, Daily With Breakfast, NANI Roche    heparin (porcine) subcutaneous injection 5,000 Units, 5,000 Units, Subcutaneous, Q8H DANIELLE, 5,000 Units at 03/10/25 0545 **AND** [CANCELED] Platelet count, , , Once, NANI Roche    ipratropium (ATROVENT) 0.02 % inhalation solution 0.5 mg, 0.5 mg, Nebulization, Q6H  PRN Max 3/day, Irene Powers MD    lamoTRIgine (LaMICtal) tablet 200 mg, 200 mg, Oral, BID, NANI Roche, 200 mg at 03/10/25 0959    levalbuterol (XOPENEX) inhalation solution 1.25 mg, 1.25 mg, Nebulization, Q6H PRN, Irene Powers MD    levETIRAcetam (KEPPRA) injection 500 mg, 500 mg, Intravenous, Q12H DANIELLE, Linda Koroma MD, 500 mg at 03/10/25 0959    melatonin tablet 6 mg, 6 mg, Oral, HS, NANI Roche, 6 mg at 03/07/25 2233    methimazole (TAPAZOLE) tablet 5 mg, 5 mg, Oral, Daily, Urbano Naidu MD, 5 mg at 03/10/25 0959    midodrine (PROAMATINE) tablet 10 mg, 10 mg, Oral, TID AC, NANI Roche, 10 mg at 03/07/25 1544    mirtazapine (REMERON) tablet 7.5 mg, 7.5 mg, Oral, HS, NANI Roche, 7.5 mg at 03/07/25 2233    multivitamin stress formula tablet 1 tablet, 1 tablet, Oral, Daily, NANI Roche, 1 tablet at 03/10/25 0959    pantoprazole (PROTONIX) EC tablet 20 mg, 20 mg, Oral, Early Morning, NANI Roche, 20 mg at 03/06/25 0532    potassium-sodium phosphates (PHOS-NAK) packet 1 packet, 1 packet, Oral, Once, Linda Koroma MD    senna-docusate sodium (SENOKOT S) 8.6-50 mg per tablet 2 tablet, 2 tablet, Oral, Daily, ANNI Roche, 2 tablet at 03/10/25 0959    tamsulosin (FLOMAX) capsule 0.4 mg, 0.4 mg, Oral, Daily With Dinner, NANI Roche, 0.4 mg at 03/07/25 1543    thiamine tablet 100 mg, 100 mg, Oral, Daily, NANI Roche, 100 mg at 03/10/25 0959    zinc sulfate (ZINCATE) capsule 220 mg, 220 mg, Oral, Daily, Charlene Joy NANI Lantigua, 220 mg at 03/10/25 0959      Lab Results: I have reviewed the following results:  Results from last 7 days   Lab Units 03/10/25  0337 03/09/25  0439 03/08/25  0927 03/08/25  0511 03/07/25  0632 03/07/25  0526 03/06/25  1223 03/06/25  0516 03/05/25  0444   WBC Thousand/uL 8.11 10.45* 9.69  --  7.79  --   --  6.39 8.47   HEMOGLOBIN g/dL 7.8* 8.7* 8.6*  --  8.0*  --   --  7.7* 10.2*   HEMATOCRIT  "% 24.7* 27.8* 27.9*  --  24.0*  --   --  23.8* 30.5*   PLATELETS Thousands/uL 226 263 193  --  212  --   --  161 206   POTASSIUM mmol/L 3.3* 3.5  --  4.1  --  3.1* 3.2* 3.7 5.1   CHLORIDE mmol/L 110* 109*  --  111*  --  105 110* 114* 107   CO2 mmol/L 24 27  --  22  --  26 15* 13* 12*   BUN mg/dL 22 28*  --  36*  --  50* 73* 86* 109*   CREATININE mg/dL 1.27 1.50*  --  1.65*  --  2.22* 3.03* 3.68* 6.38*   CALCIUM mg/dL 7.9* 8.2*  --  7.8*  --  8.2* 8.3* 8.3* 9.0   MAGNESIUM mg/dL 1.9 1.8*  --  2.1  --  1.8*  --  2.3 2.6   PHOSPHORUS mg/dL 2.2*  --   --  3.0  --  1.5*  --  4.2* 4.0   ALBUMIN g/dL  --   --   --   --   --   --   --  2.9* 3.3*       Administrative Statements     Portions of the record may have been created with voice recognition software. Occasional wrong word or \"sound a like\" substitutions may have occurred due to the inherent limitations of voice recognition software. Read the chart carefully and recognize, using context, where substitutions have occurred.If you have any questions, please contact the dictating provider.  "

## 2025-03-10 NOTE — ASSESSMENT & PLAN NOTE
Malnutrition Findings:   Adult Malnutrition type: Chronic illness  Adult Degree of Malnutrition: Other severe protein calorie malnutrition                     360 Statement: Pt presents with severe protein calorie malnutrition as evidenced by sunken orbitals, b/l temporal depressions and visble clavicle bone. Treat with diet and supplements BID.    BMI Findings:           Body mass index is 23.95 kg/m².   Encourage oral intake.  Nutrition input appreciated

## 2025-03-10 NOTE — ASSESSMENT & PLAN NOTE
Repeat renal ultrasound showed improvement in left hydronephrosis but persistent severe right hydronephrosis likely related to bladder outlet obstruction.  Appreciate urology consultation: Continue Anderson catheter for now per their recommendation and trend renal function; get interval renal ultrasound in a few weeks to determine bladder is decompressed?  Potential SPT.

## 2025-03-10 NOTE — SPEECH THERAPY NOTE
Speech Language/Pathology  Speech-Language Pathology Bedside Swallow Evaluation      Patient Name: Trevor Waldrop    Today's Date: 3/10/2025     Problem List  Principal Problem:    Septic shock (HCC)  Active Problems:    GERD with esophagitis    Bipolar disorder (HCC)    Localization-related (focal) (partial) symptomatic epilepsy and epileptic syndromes with complex partial seizures, not intractable, with status epilepticus (HCC)    Influenza A    Left lower lobe pneumonia    Acute cystitis with hematuria    JOHNY (acute kidney injury) (HCC)    Metabolic acidosis    Severe protein-calorie malnutrition (HCC)    Electrolyte abnormality    Other hydronephrosis    Seizure disorder (HCC)      Past Medical History  Past Medical History:   Diagnosis Date    Alcohol abuse     Anemia 6/14/2019    On iron.  Unclear if ever documented iron deficient    GERD (gastroesophageal reflux disease)     GERD with esophagitis 6/14/2019    EGD in [place] [id number]    Hx of AKA (above knee amputation) (HCC)     Right    Infectious viral hepatitis     Seizures (HCC)     Seizures (HCC)     Ulcer of esophagus        Past Surgical History  Past Surgical History:   Procedure Laterality Date    BELOW KNEE LEG AMPUTATION      Left    EGD  04/25/2017    LUNG DECORTICATION Left        Summary   Pt presents w/ s/s suggestive of mild-mod oral dysphagia, suspected min pharyngeal dysphagia.     Reduced labial seal for retrieval and containment of thin liquids via cup sip w/ mild L sided anterior bolus loss suspect 2/2 pt appearing to have torticollis w/ head tilt to the L. Adequate labial seal for retrieval and containment of all other materials. Mod-severe prolonged palatal mashing of regular textures, min prolonged palatal mashing of dental soft textures w/ independent addition of thin liquids to aid in breakdown, ultimately functional. Adequate bolus breakdown and functional bolus transfer. Min diffuse oral residue w/ all materials, cleared w/ liquid  wash, no pocketing visualized throughout encounter. Suspected delayed swallow initiation and reduced hyolaryngeal elevation to palpation. Delayed cough x1 w/ initial cup sip thin liquids w/ desaturation from 92 to 89 though quickly recovered. No overt s/s of aspiration w/ subsequent trials.     Education initiated w/ pt regarding strategies to optimize swallow safety including frequent/thorough oral care and to notify medical staff if s/s of aspiration arise. Pt verbalized understanding and agreement, denies questions or concerns at this time though ?evidence of learning.     Pt w/ increased risk of aspiration 2/2 current admission 2/ fever and influenza infection, abn lung imaging, and multiple medical co-morbidities. SLP to f/u for diet tolerance as able and appropriate, will monitor need for VFSS completion if medically indicated. S/w RN regarding providing feeding assistance, full supervision, monitoring pocketing, and providing verbal cues for small bites/sips and slow rate.       Recommended Diet: soft/level 3 diet and thin liquids   Recommended Form of Meds: as tolerated    Aspiration precautions and swallowing strategies: upright posture, only feed when fully alert, slow rate of feeding, small bites/sips, and alternating bites and sips  Other Recommendations: Continue frequent oral care, provide feeding assistance/supervision, verbal cues for slow rate and small bites/sips, monitor for pocketing         Current Medical Status  Pt is a 60 y.o. male  who presents from nursing facility with report of fever, hypotension, in the setting of influenza outbreak at the facility.  In the ER patient was found to be tachycardic, tachypneic, and with fever of 101.5 on admission.  Found to be influenza A positive, imaging showing left lower lobe consolidation.  UA suggestive of UTI, Anderson placed and patient with hematuria.  Cultures were obtained and he was started on Tamiflu, cefepime, vancomycin.  He was given 3 L IV  fluid in the ER with transient response and eventually needed to start on vasopressors.  Past medical history of TBI, seizures, GERD, anemia, hyponatremia, bipolar, bilateral lower extremity amputations.    S/w RN who reports nightshift RN c/f pt pocketing medication, current RN states he is more awake/alert than previous days.     Current Precautions:  Fall  Aspiration  Contact  Droplet     Allergies:  No known food allergies    Past medical history:  Please see H&P for details    Special Studies:  3/5/25 CT chest abdomen pelvis wo contrast:  Left lower lobe airspace consolidation and bilateral multi lobar tree-in-bud infiltrates may represent pneumonia and/or aspiration.     Bilateral lower lobe segmental and subsegmental endobronchial opacification, left greater than right may represent mucous plugging and/or aspiration.     Stable right upper lobe cystic lesion with mural nodularity and left upper lobe spiculated lesion unchanged since November 2020. Noncontrast chest CT follow-up in 12 months is advised.     Additional chronic findings and negatives as above.     CT abdomen and pelvis:     Severe bilateral hydronephroureterosis extending through the distended bladder. No radiopaque urinary tract calculi or perinephric collection. Findings suggestive of acute on chronic urinary retention. Correlate with UA to exclude superimposed cystitis.     Cholelithiasis.     Additional chronic findings and negatives as above.      3/5/25 XR chest portable:  Bilateral infiltrates left greater than right suspicious for pneumonia.     The lesions seen on the prior CT are not visible on x-ray. Please refer to the report of the follow-up CT scan obtained after this x-ray for further details       History of VFSS:  N/A     Social/Education/Vocational Hx:  Pt lives in SNF/ECF    Swallow Information   Current Diet: NPO   Baseline Diet: regular diet and thin liquids per pt's facesheet       Baseline Assessment   Behavior/Cognition:  "alert  Speech/Language Status: able to participate in conversation and able to follow commands  Patient Positioning: upright in bed  Pain Status/Interventions/Response to Interventions: No report of or nonverbal indications of pain.       Oral Mechanism Exam  Facial: symmetrical  Labial: WFL  Lingual: WFL  Velum: symmetrical  Mandible: adequate ROM  Dentition: edentulous- pt reports not wearing or having dentures - \"I need to get some made\"   Vocal quality:clear/adequate   Volitional Cough: strong/productive   Respiratory Status: on RA        Consistencies Assessed and Performance   Consistencies Administered: thin liquids, puree, mechanical soft solids, soft solids, and hard solids    Oral Stage:   Reduced labial seal for retrieval and containment of thin liquids via cup sip w/ mild L sided anterior bolus loss suspect 2/2 pt appearing to have torticollis w/ head tilt to the L. Adequate labial seal for retrieval and containment of all other materials. Mod-severe prolonged palatal mashing of regular textures, min prolonged palatal mashing of dental soft textures w/ independent addition of thin liquids to aid in breakdown, ultimately functional. Adequate bolus breakdown and functional bolus transfer. Min diffuse oral residue w/ all materials, cleared w/ liquid wash, no pocketing visualized throughout encounter.     Pharyngeal Stage:   Suspected delayed swallow initiation and reduced hyolaryngeal elevation to palpation. Delayed cough x1 w/ initial cup sip thin liquids w/ desaturation from 92 to 89 though quickly recovered. No overt s/s of aspiration w/ subsequent trials.     Esophageal Concerns: none reported    Summary and Recommendations (see above)    Results Reviewed with: patient, RN, and MD     Treatment Recommended: as able and appropriate for diet tolerance, will monitor need for VFSS completion     Dysphagia LTG  -Patient will demonstrate safe and effective oral intake (without overt s/s significant " oral/pharyngeal dysphagia including s/s penetration or aspiration) for the highest appropriate diet level.     Short Term Goals:  -Pt will tolerate Dysphagia 3/advanced (dental soft) diet and thin liquid with no significant s/s oral or pharyngeal dysphagia across 1-3 diagnostic session/s.    -Patient will tolerate trials of upgraded food and/or liquid texture with no significant s/s of oral or pharyngeal dysphagia including aspiration across 1-3 diagnostic sessions     -Patient will comply with a Video/Modified Barium Swallow study for more complete assessment of swallowing anatomy/physiology/aspiration risk and to assess efficacy of treatment techniques so as to best guide treatment plan if medically indicated     Speech Therapy Prognosis   Prognosis: good/fair     Prognosis Considerations: age, medical status, and prior medical history

## 2025-03-10 NOTE — ASSESSMENT & PLAN NOTE
-POA with a creatinine of 6.36.  Prior creatinine in 2022 were 1.1-1.4.  Likely in the setting of septic shock with ATN.  Anderson catheter was placed in the emergency room.  -CT A/P on admission showing severe bilateral hydroureteronephrosis with distended bladder  -Anderson catheter was subsequently placed  -Repeat renal ultrasound shows mild to moderate dilatation of left pelvic calyceal system with severe hydronephrosis of the right kidney  -Patient seen by urology, continue Anderson catheter.  Consideration for suprapubic tube in the future  -cont IVF per nephro

## 2025-03-10 NOTE — PROGRESS NOTES
Progress Note - Hospitalist   Name: Trevor Waldrop 60 y.o. male I MRN: [id number]  Unit/Bed#: -01 I Date of Admission: 3/5/2025   Date of Service: 3/10/2025 I Hospital Day: 5     Assessment & Plan  Septic shock ([facility])  Patient was admitted from nursing facility with fever and hypotension  Tested positive for influenza A and urine suggestive of urinary tract infection  Admission CT with left lower lobe consolidation  Met criteria for septic shock and required vasopressor support transiently.  Started on broad-spectrum antibiotic (vancomycin and cefepime) with Tamiflu for acute influenza  Blood cultures remain negative to date.  Urine culture with procidentia  MRSA swab negative.  Vancomycin discontinued  Antibiotic switched to ceftriaxone based on sensitivity result  Blood pressure has remained stable with MAP greater than 65  Remains intermittently febrile  Cont IVF support  GERD with esophagitis  -cont PPI  Bipolar disorder ([facility])  Continue Lamictal  Localization-related (focal) (partial) symptomatic epilepsy and epileptic syndromes with complex partial seizures, not intractable, with status epilepticus ([facility])  -cont Keppra  Influenza A  -cont Tamiflu  Left lower lobe pneumonia  -CT chest: Left lower lobe airspace consolidation and bilateral multi lobar tree-in-bud infiltrates may represent pneumonia and/or aspiration. Bilateral lower lobe segmental and subsegmental endobronchial opacification, left greater than right may represent mucous plugging and/or aspiration. Stable right upper lobe cystic lesion with mural nodularity and left upper lobe spiculated lesion unchanged since November 2020. Noncontrast chest CT follow-up in 12 months is advised.   -cont Rocephin  Acute cystitis with hematuria  -Required Anderson catheter placement for chronic bladder outlet obstruction and bilateral severe hydronephrosis.    -Urine culture with procidentia.    -cont Rocphein  JOHNY (acute kidney injury) ([facility])  -POA with a creatinine  of 6.36.  Prior creatinine in 2022 were 1.1-1.4.  Likely in the setting of septic shock with ATN.  Anderson catheter was placed in the emergency room.  -CT A/P on admission showing severe bilateral hydroureteronephrosis with distended bladder  -Anderson catheter was subsequently placed  -Repeat renal ultrasound shows mild to moderate dilatation of left pelvic calyceal system with severe hydronephrosis of the right kidney  -Patient seen by urology, continue Anderson catheter.  Consideration for suprapubic tube in the future  -cont IVF per nephro  Severe protein-calorie malnutrition (HCC)  Malnutrition Findings:   Adult Malnutrition type: Chronic illness  Adult Degree of Malnutrition: Other severe protein calorie malnutrition                     360 Statement: Pt presents with severe protein calorie malnutrition as evidenced by sunken orbitals, b/l temporal depressions and visble clavicle bone. Treat with diet and supplements BID.    BMI Findings:           Body mass index is 23.95 kg/m².   Encourage oral intake.  Nutrition input appreciated  Electrolyte abnormality  Multiple electrolyte abnormalities including hypokalemia hypomagnesemia and hypophosphatemia.    -2g IV Mg today  Other hydronephrosis  Repeat renal ultrasound results noted.    Urology consulted.    Maintain Anderson catheter for now.  Seizure disorder (HCC)  -cont Keppra  Anemia    Hypertension     VTE  Prophylaxis:   Pharmacologic: in place  Mechanical VTE Prophylaxis in Place: Yes    Patient Centered Rounds: I have performed bedside rounds with nursing staff today.    Discussions with Specialists or Other Care Team Provider: case management    Education and Discussions with Family / Patient:yes    Mobility:   Basic Mobility Inpatient Raw Score: 6  JH-HLM Goal: 2: Bed activities/Dependent transfer  JH-HLM Achieved: 2: Bed activities/Dependent transfer      Current Length of Stay: 5 day(s)    Current Patient Status: Inpatient        Code Status: Level 1 - Full  Code    Discharge Plan: Pt will require continued inpatient hospitalization.    Subjective:     Pt states that he has no chest pain or sob  He was febrile this am    Patient is seen and examined at bedside.  All other ROS are negative.    Objective:     Vitals:   Temp (24hrs), Av °F (37.8 °C), Min:99.4 °F (37.4 °C), Max:100.5 °F (38.1 °C)    Temp:  [99.4 °F (37.4 °C)-100.5 °F (38.1 °C)] 99.8 °F (37.7 °C)  HR:  [] 99  Resp:  [18] 18  BP: (107-134)/(70-76) 134/76  SpO2:  [96 %-98 %] 96 %  Body mass index is 23.95 kg/m².     Input and Output Summary (last 24 hours):       Intake/Output Summary (Last 24 hours) at 3/10/2025 1415  Last data filed at 3/10/2025 1257  Gross per 24 hour   Intake 3508.33 ml   Output 1525 ml   Net 1983.33 ml       Physical Exam:       GEN: No acute distress, comfortable, sweats, chronically il appearing  HEEENT: No JVD, PERRLA, no scleral icterus  RESP: Lungs clear to auscultation bilaterally  CV: RRR, +s1/s2   ABD: SOFT NON TENDER, POSITIVE BOWEL SOUNDS, NO DISTENTION  PSYCH: CALM  NEURO: Mentation baseline, NO FOCAL DEFICITS  SKIN: NO RASH  EXTREM: NO EDEMA    Additional Data:     Labs:    Results from last 7 days   Lab Units 03/10/25  0337 25  0439 25  0516 25  0444   WBC Thousand/uL 8.11 10.45*   < > 8.47   HEMOGLOBIN g/dL 7.8* 8.7*   < > 10.2*   HEMATOCRIT % 24.7* 27.8*   < > 30.5*   PLATELETS Thousands/uL 226 263   < > 206   BANDS PCT %  --   --   --  5   SEGS PCT %  --  87*   < >  --    LYMPHO PCT %  --  7*   < > 13*   MONO PCT %  --  3*   < > 0*   EOS PCT %  --  2   < > 0    < > = values in this interval not displayed.     Results from last 7 days   Lab Units 03/10/25  0337 25  1223 25  0516   SODIUM mmol/L 143   < > 141   POTASSIUM mmol/L 3.3*   < > 3.7   CHLORIDE mmol/L 110*   < > 114*   CO2 mmol/L 24   < > 13*   BUN mg/dL 22   < > 86*   CREATININE mg/dL 1.27   < > 3.68*   ANION GAP mmol/L 9   < > 14*   CALCIUM mg/dL 7.9*   < > 8.3*   ALBUMIN  g/dL  --   --  2.9*   TOTAL BILIRUBIN mg/dL  --   --  0.38   ALK PHOS U/L  --   --  50   ALT U/L  --   --  17   AST U/L  --   --  28   GLUCOSE RANDOM mg/dL 104   < > 92    < > = values in this interval not displayed.     Results from last 7 days   Lab Units 03/05/25  0611   INR  1.38*     Results from last 7 days   Lab Units 03/10/25  1217 03/10/25  0618 03/10/25  0023 03/09/25  1808 03/09/25  1145   POC GLUCOSE mg/dl 128 104 96 109 105         Results from last 7 days   Lab Units 03/05/25  0444   LACTIC ACID mmol/L 1.1   PROCALCITONIN ng/ml 14.58*       Lines/Drains:  Invasive Devices       Peripheral Intravenous Line  Duration             Peripheral IV 03/09/25 Left Arm 1 day              Drain  Duration             Urethral Catheter Temperature probe 16 Fr. 5 days                    Telemetry:        * I Have Reviewed All Lab Data Listed Above.           Imaging:     Results for orders placed during the hospital encounter of 03/05/25    XR chest portable    Narrative  XR CHEST PORTABLE    INDICATION: fever.    COMPARISON: 11/13/2020 and CT scan from 11/14/2020    FINDINGS:    There is infiltrate in the left and right lower lung fields suggestive of pneumonia. The cystic lesion with internal nodule which was noted on the prior CT is not visible on plain film. The small spiculated nodule in the left lung which was mentioned on  the prior CT is also not readily apparent on plain film. No pneumothorax or pleural effusion.    Normal cardiomediastinal silhouette.    Bones are unremarkable for age.    Normal upper abdomen.    Impression  Bilateral infiltrates left greater than right suspicious for pneumonia.    The lesions seen on the prior CT are not visible on x-ray. Please refer to the report of the follow-up CT scan obtained after this x-ray for further details        Workstation performed: [id number]    No results found for this or any previous visit.      *I have reviewed all imaging reports listed  above      Recent Cultures (last 7 days):     Results from last 7 days   Lab Units 03/05/25  1044 03/05/25  0628 03/05/25  0515 03/05/25  0444   BLOOD CULTURE   --   --  No Growth After 5 Days. No Growth After 5 Days.   URINE CULTURE   --  >100,000 cfu/ml Providencia rettgeri*  --   --    LEGIONELLA URINARY ANTIGEN  Negative  --   --   --        Last 24 Hours Medication List:   Current Facility-Administered Medications   Medication Dose Route Frequency Provider Last Rate    acetaminophen  650 mg Oral Q4H PRN NANI Roche      aspirin  81 mg Oral Daily NANI Roche      calcium carbonate  1,000 mg Oral BID PRN NANI Roche      cefTRIAXone  1,000 mg Intravenous Q24H Linda Koroma  mL/hr at 03/10/25 1257    chlorhexidine  15 mL Mouth/Throat Q12H [facility] NANI Roche      cholecalciferol  2,000 Units Oral Daily NANI Roche      dextrose 5 % and sodium chloride 0.2 %  100 mL/hr Intravenous Continuous Rich Elaine  mL/hr (03/10/25 1257)    ferrous sulfate  325 mg Oral Daily With Breakfast NANI Roche      heparin (porcine)  5,000 Units Subcutaneous Q8H [facility] NANI Roche      ipratropium  0.5 mg Nebulization Q6H PRN Max 3/day Irene Powers MD      lamoTRIgine  200 mg Oral BID NANI Roche      levalbuterol  1.25 mg Nebulization Q6H PRN Irene Powers MD      levETIRAcetam  500 mg Intravenous Q12H [facility] Linda Koroma MD      melatonin  6 mg Oral HS NANI Roche      methimazole  5 mg Oral Daily Urbano Naidu MD      midodrine  10 mg Oral TID AC NANI Roche      mirtazapine  7.5 mg Oral HS NANI Roche      multivitamin stress formula  1 tablet Oral Daily NANI Roche      pantoprazole  20 mg Oral Early Morning NANI Roche      potassium-sodium phosphates  1 packet Oral Once Linda Koroma MD      potassium-sodium phosphates  1 packet Oral BID With Meals Jojo Finney,  NANI      senna-docusate sodium  2 tablet Oral Daily NANI Roche      tamsulosin  0.4 mg Oral Daily With Dinner NANI Roche      thiamine  100 mg Oral Daily NANI Roche      zinc sulfate  220 mg Oral Daily NANI Roche          Today, Patient Was Seen By: Elise Church MD    ** Please Note: Dictation voice to text software may have been used in the creation of this document. **

## 2025-03-10 NOTE — PLAN OF CARE
Problem: Potential for Falls  Goal: Patient will remain free of falls  Description: INTERVENTIONS:  - Educate patient/family on patient safety including physical limitations  - Instruct patient to call for assistance with activity   - Consult OT/PT to assist with strengthening/mobility   - Keep Call bell within reach  - Keep bed low and locked with side rails adjusted as appropriate  - Keep care items and personal belongings within reach  - Initiate and maintain comfort rounds  - Make Fall Risk Sign visible to staff  - Offer Toileting every x Hours, in advance of need  - Initiate/Maintain xalarm  - Obtain necessary fall risk management equipment: x  - Apply yellow socks and bracelet for high fall risk patients  - Consider moving patient to room near nurses station  Outcome: Progressing     Problem: Prexisting or High Potential for Compromised Skin Integrity  Goal: Skin integrity is maintained or improved  Description: INTERVENTIONS:  - Identify patients at risk for skin breakdown  - Assess and monitor skin integrity  - Assess and monitor nutrition and hydration status  - Monitor labs   - Assess for incontinence   - Turn and reposition patient  - Assist with mobility/ambulation  - Relieve pressure over bony prominences  - Avoid friction and shearing  - Provide appropriate hygiene as needed including keeping skin clean and dry  - Evaluate need for skin moisturizer/barrier cream  - Collaborate with interdisciplinary team   - Patient/family teaching  - Consider wound care consult   Outcome: Progressing     Problem: PAIN - ADULT  Goal: Verbalizes/displays adequate comfort level or baseline comfort level  Description: Interventions:  - Encourage patient to monitor pain and request assistance  - Assess pain using appropriate pain scale  - Administer analgesics based on type and severity of pain and evaluate response  - Implement non-pharmacological measures as appropriate and evaluate response  - Consider cultural and  social influences on pain and pain management  - Notify physician/advanced practitioner if interventions unsuccessful or patient reports new pain  Outcome: Progressing     Problem: INFECTION - ADULT  Goal: Absence or prevention of progression during hospitalization  Description: INTERVENTIONS:  - Assess and monitor for signs and symptoms of infection  - Monitor lab/diagnostic results  - Monitor all insertion sites, i.e. indwelling lines, tubes, and drains  - Monitor endotracheal if appropriate and nasal secretions for changes in amount and color  - [place] appropriate cooling/warming therapies per order  - Administer medications as ordered  - Instruct and encourage patient and family to use good hand hygiene technique  - Identify and instruct in appropriate isolation precautions for identified infection/condition  Outcome: Progressing  Goal: Absence of fever/infection during neutropenic period  Description: INTERVENTIONS:  - Monitor WBC    Outcome: Progressing     Problem: SAFETY ADULT  Goal: Patient will remain free of falls  Description: INTERVENTIONS:  - Educate patient/family on patient safety including physical limitations  - Instruct patient to call for assistance with activity   - Consult OT/PT to assist with strengthening/mobility   - Keep Call bell within reach  - Keep bed low and locked with side rails adjusted as appropriate  - Keep care items and personal belongings within reach  - Initiate and maintain comfort rounds  - Make Fall Risk Sign visible to staff  - Offer Toileting every x Hours, in advance of need  - Initiate/Maintain xalarm  - Obtain necessary fall risk management equipment: x  - Apply yellow socks and bracelet for high fall risk patients  - Consider moving patient to room near nurses station  Outcome: Progressing  Goal: Maintain or return to baseline ADL function  Description: INTERVENTIONS:  -  Assess patient's ability to carry out ADLs; assess patient's baseline for ADL function and  identify physical deficits which impact ability to perform ADLs (bathing, care of mouth/teeth, toileting, grooming, dressing, etc.)  - Assess/evaluate cause of self-care deficits   - Assess range of motion  - Assess patient's mobility; develop plan if impaired  - Assess patient's need for assistive devices and provide as appropriate  - Encourage maximum independence but intervene and supervise when necessary  - Involve family in performance of ADLs  - Assess for home care needs following discharge   - Consider OT consult to assist with ADL evaluation and planning for discharge  - Provide patient education as appropriate  Outcome: Progressing  Goal: Maintains/Returns to pre admission functional level  Description: INTERVENTIONS:  - Perform AM-PAC 6 Click Basic Mobility/ Daily Activity assessment daily.  - Set and communicate daily mobility goal to care team and patient/family/caregiver.   - Collaborate with rehabilitation services on mobility goals if consulted  - Perform Range of Motion x times a day.  - Reposition patient every x hours.  - Dangle patient x times a day  - Stand patient x times a day  - Ambulate patient xx times a day  - Out of bed to chair x times a day   - Out of bed for meals xxx times a day  - Out of bed for toileting  - Record patient progress and toleration of activity level   Outcome: Progressing     Problem: DISCHARGE PLANNING  Goal: Discharge to home or other facility with appropriate resources  Description: INTERVENTIONS:  - Identify barriers to discharge w/patient and caregiver  - Arrange for needed discharge resources and transportation as appropriate  - Identify discharge learning needs (meds, wound care, etc.)  - Arrange for interpretive services to assist at discharge as needed  - Refer to Case Management Department for coordinating discharge planning if the patient needs post-hospital services based on physician/advanced practitioner order or complex needs related to functional  status, cognitive ability, or social support system  Outcome: Progressing     Problem: Knowledge Deficit  Goal: Patient/family/caregiver demonstrates understanding of disease process, treatment plan, medications, and discharge instructions  Description: Complete learning assessment and assess knowledge base.  Interventions:  - Provide teaching at level of understanding  - Provide teaching via preferred learning methods  Outcome: Progressing     Problem: Nutrition/Hydration-ADULT  Goal: Nutrient/Hydration intake appropriate for improving, restoring or maintaining nutritional needs  Description: Monitor and assess patient's nutrition/hydration status for malnutrition. Collaborate with interdisciplinary team and initiate plan and interventions as ordered.  Monitor patient's weight and dietary intake as ordered or per policy. Utilize nutrition screening tool and intervene as necessary. Determine patient's food preferences and provide high-protein, high-caloric foods as appropriate.     INTERVENTIONS:  - Monitor oral intake, urinary output, labs, and treatment plans  - Assess nutrition and hydration status and recommend course of action  - Evaluate amount of meals eaten  - Assist patient with eating if necessary   - Allow adequate time for meals  - Recommend/ encourage appropriate diets, oral nutritional supplements, and vitamin/mineral supplements  - Order, calculate, and assess calorie counts as needed  - Recommend, monitor, and adjust tube feedings and TPN/PPN based on assessed needs  - Assess need for intravenous fluids  - Provide specific nutrition/hydration education as appropriate  - Include patient/family/caregiver in decisions related to nutrition  Outcome: Progressing

## 2025-03-10 NOTE — ASSESSMENT & PLAN NOTE
Hypernatremia will lower sodium content and IV fluids.  Hypokalemia, mag level normal.  Replace as needed.  Hypophosphatemia, will provide phosphorus supplementation.  Encourage oral intake.

## 2025-03-10 NOTE — ASSESSMENT & PLAN NOTE
Continue to monitor and transfuse as needed for hemoglobin less than 7.0.  Iron panel is pending.  May continue oral iron.

## 2025-03-11 LAB
ALBUMIN SERPL BCG-MCNC: 2.2 G/DL (ref 3.5–5)
ALP SERPL-CCNC: 67 U/L (ref 34–104)
ALT SERPL W P-5'-P-CCNC: 21 U/L (ref 7–52)
ANION GAP SERPL CALCULATED.3IONS-SCNC: 9 MMOL/L (ref 4–13)
ANISOCYTOSIS BLD QL SMEAR: PRESENT
AST SERPL W P-5'-P-CCNC: 31 U/L (ref 13–39)
BASOPHILS # BLD MANUAL: 0 THOUSAND/UL (ref 0–0.1)
BASOPHILS NFR MAR MANUAL: 0 % (ref 0–1)
BILIRUB SERPL-MCNC: 0.35 MG/DL (ref 0.2–1)
BUN SERPL-MCNC: 18 MG/DL (ref 5–25)
CALCIUM ALBUM COR SERPL-MCNC: 9.2 MG/DL (ref 8.3–10.1)
CALCIUM SERPL-MCNC: 7.8 MG/DL (ref 8.4–10.2)
CHLORIDE SERPL-SCNC: 104 MMOL/L (ref 96–108)
CO2 SERPL-SCNC: 23 MMOL/L (ref 21–32)
CREAT SERPL-MCNC: 1.13 MG/DL (ref 0.6–1.3)
EOSINOPHIL # BLD MANUAL: 0.24 THOUSAND/UL (ref 0–0.4)
EOSINOPHIL NFR BLD MANUAL: 4 % (ref 0–6)
ERYTHROCYTE [DISTWIDTH] IN BLOOD BY AUTOMATED COUNT: 12.7 % (ref 11.6–15.1)
GFR SERPL CREATININE-BSD FRML MDRD: 70 ML/MIN/1.73SQ M
GLUCOSE SERPL-MCNC: 112 MG/DL (ref 65–140)
GLUCOSE SERPL-MCNC: 133 MG/DL (ref 65–140)
GLUCOSE SERPL-MCNC: 134 MG/DL (ref 65–140)
GLUCOSE SERPL-MCNC: 87 MG/DL (ref 65–140)
GLUCOSE SERPL-MCNC: 90 MG/DL (ref 65–140)
GLUCOSE SERPL-MCNC: 95 MG/DL (ref 65–140)
HCT VFR BLD AUTO: 25 % (ref 36.5–49.3)
HGB BLD-MCNC: 7.9 G/DL (ref 12–17)
LYMPHOCYTES # BLD AUTO: 1.27 THOUSAND/UL (ref 0.6–4.47)
LYMPHOCYTES # BLD AUTO: 20 % (ref 14–44)
MAGNESIUM SERPL-MCNC: 1.3 MG/DL (ref 1.9–2.7)
MCH RBC QN AUTO: 31.3 PG (ref 26.8–34.3)
MCHC RBC AUTO-ENTMCNC: 31.6 G/DL (ref 31.4–37.4)
MCV RBC AUTO: 99 FL (ref 82–98)
MONOCYTES # BLD AUTO: 0.12 THOUSAND/UL (ref 0–1.22)
MONOCYTES NFR BLD: 2 % (ref 4–12)
NEUTROPHILS # BLD MANUAL: 4.4 THOUSAND/UL (ref 1.85–7.62)
NEUTS SEG NFR BLD AUTO: 73 % (ref 43–75)
PHOSPHATE SERPL-MCNC: 1.9 MG/DL (ref 2.3–4.1)
PLATELET # BLD AUTO: 203 THOUSANDS/UL (ref 149–390)
PLATELET BLD QL SMEAR: ADEQUATE
PMV BLD AUTO: 10.4 FL (ref 8.9–12.7)
POTASSIUM SERPL-SCNC: 4.1 MMOL/L (ref 3.5–5.3)
PROT SERPL-MCNC: 6 G/DL (ref 6.4–8.4)
RBC # BLD AUTO: 2.52 MILLION/UL (ref 3.88–5.62)
RBC MORPH BLD: PRESENT
SODIUM SERPL-SCNC: 136 MMOL/L (ref 135–147)
THYROGLOB AB SERPL-ACNC: <1 IU/ML (ref 0–0.9)
THYROPEROXIDASE AB SERPL-ACNC: 14 IU/ML (ref 0–34)
TOXIC GRANULES BLD QL SMEAR: PRESENT
VARIANT LYMPHS # BLD AUTO: 1 %
WBC # BLD AUTO: 6.03 THOUSAND/UL (ref 4.31–10.16)
WBC TOXIC VACUOLES BLD QL SMEAR: PRESENT

## 2025-03-11 PROCEDURE — 83735 ASSAY OF MAGNESIUM: CPT | Performed by: HOSPITALIST

## 2025-03-11 PROCEDURE — 85027 COMPLETE CBC AUTOMATED: CPT | Performed by: HOSPITALIST

## 2025-03-11 PROCEDURE — 85007 BL SMEAR W/DIFF WBC COUNT: CPT | Performed by: HOSPITALIST

## 2025-03-11 PROCEDURE — 80053 COMPREHEN METABOLIC PANEL: CPT | Performed by: HOSPITALIST

## 2025-03-11 PROCEDURE — 82948 REAGENT STRIP/BLOOD GLUCOSE: CPT

## 2025-03-11 PROCEDURE — 84100 ASSAY OF PHOSPHORUS: CPT | Performed by: HOSPITALIST

## 2025-03-11 PROCEDURE — 99232 SBSQ HOSP IP/OBS MODERATE 35: CPT | Performed by: HOSPITALIST

## 2025-03-11 PROCEDURE — 92526 ORAL FUNCTION THERAPY: CPT

## 2025-03-11 RX ORDER — ALBUMIN HUMAN 50 G/1000ML
25 SOLUTION INTRAVENOUS ONCE
Status: COMPLETED | OUTPATIENT
Start: 2025-03-11 | End: 2025-03-11

## 2025-03-11 RX ADMIN — MIDODRINE HYDROCHLORIDE 10 MG: 5 TABLET ORAL at 05:23

## 2025-03-11 RX ADMIN — MIDODRINE HYDROCHLORIDE 10 MG: 5 TABLET ORAL at 17:29

## 2025-03-11 RX ADMIN — HEPARIN SODIUM 5000 UNITS: 5000 INJECTION INTRAVENOUS; SUBCUTANEOUS at 21:37

## 2025-03-11 RX ADMIN — ZINC SULFATE 220 MG (50 MG) CAPSULE 220 MG: CAPSULE at 08:22

## 2025-03-11 RX ADMIN — METHIMAZOLE 5 MG: 5 TABLET ORAL at 08:22

## 2025-03-11 RX ADMIN — LAMOTRIGINE 200 MG: 100 TABLET ORAL at 17:29

## 2025-03-11 RX ADMIN — POTASSIUM & SODIUM PHOSPHATES POWDER PACK 280-160-250 MG 1 PACKET: 280-160-250 PACK at 08:22

## 2025-03-11 RX ADMIN — LAMOTRIGINE 200 MG: 100 TABLET ORAL at 08:22

## 2025-03-11 RX ADMIN — ALBUMIN (HUMAN) 25 G: 12.5 INJECTION, SOLUTION INTRAVENOUS at 22:58

## 2025-03-11 RX ADMIN — DEXTROSE, SODIUM CHLORIDE, AND POTASSIUM CHLORIDE 75 ML/HR: 5; .45; .15 INJECTION INTRAVENOUS at 19:11

## 2025-03-11 RX ADMIN — Medication 6 MG: at 21:37

## 2025-03-11 RX ADMIN — PANTOPRAZOLE SODIUM 20 MG: 20 TABLET, DELAYED RELEASE ORAL at 05:22

## 2025-03-11 RX ADMIN — LEVETIRACETAM 500 MG: 500 INJECTION, SOLUTION INTRAVENOUS at 21:38

## 2025-03-11 RX ADMIN — Medication 100 MG: at 08:22

## 2025-03-11 RX ADMIN — B-COMPLEX W/ C & FOLIC ACID TAB 1 TABLET: TAB at 08:22

## 2025-03-11 RX ADMIN — HEPARIN SODIUM 5000 UNITS: 5000 INJECTION INTRAVENOUS; SUBCUTANEOUS at 05:22

## 2025-03-11 RX ADMIN — SENNOSIDES AND DOCUSATE SODIUM 2 TABLET: 50; 8.6 TABLET ORAL at 08:22

## 2025-03-11 RX ADMIN — ACETAMINOPHEN 650 MG: 325 TABLET, FILM COATED ORAL at 21:42

## 2025-03-11 RX ADMIN — LEVETIRACETAM 500 MG: 500 INJECTION, SOLUTION INTRAVENOUS at 08:22

## 2025-03-11 RX ADMIN — DEXTROSE, SODIUM CHLORIDE, AND POTASSIUM CHLORIDE 75 ML/HR: 5; .45; .15 INJECTION INTRAVENOUS at 04:29

## 2025-03-11 RX ADMIN — FERROUS SULFATE TAB 325 MG (65 MG ELEMENTAL FE) 325 MG: 325 (65 FE) TAB at 08:22

## 2025-03-11 RX ADMIN — MIRTAZAPINE 7.5 MG: 15 TABLET, FILM COATED ORAL at 21:36

## 2025-03-11 RX ADMIN — TAMSULOSIN HYDROCHLORIDE 0.4 MG: 0.4 CAPSULE ORAL at 17:29

## 2025-03-11 RX ADMIN — ASPIRIN 81 MG CHEWABLE TABLET 81 MG: 81 TABLET CHEWABLE at 08:22

## 2025-03-11 RX ADMIN — CHLORHEXIDINE GLUCONATE 15 ML: 1.2 RINSE ORAL at 08:22

## 2025-03-11 RX ADMIN — Medication 2000 UNITS: at 08:22

## 2025-03-11 RX ADMIN — CEFTRIAXONE 1000 MG: 1 INJECTION, SOLUTION INTRAVENOUS at 20:48

## 2025-03-11 RX ADMIN — HEPARIN SODIUM 5000 UNITS: 5000 INJECTION INTRAVENOUS; SUBCUTANEOUS at 14:03

## 2025-03-11 RX ADMIN — MIDODRINE HYDROCHLORIDE 10 MG: 5 TABLET ORAL at 11:36

## 2025-03-11 NOTE — PROGRESS NOTES
Progress Note - Hospitalist   Name: Trevor Waldrop 60 y.o. male I MRN: [id number]  Unit/Bed#: -01 I Date of Admission: 3/5/2025   Date of Service: 3/11/2025 I Hospital Day: 6     Assessment & Plan  Septic shock ([facility])  Patient was admitted from nursing facility with fever and hypotension  Tested positive for influenza A and urine suggestive of urinary tract infection  Admission CT with left lower lobe consolidation  Met criteria for septic shock and required vasopressor support transiently.  Started on broad-spectrum antibiotic (vancomycin and cefepime) with Tamiflu for acute influenza  Blood cultures remain negative to date.  Urine culture with procidentia  MRSA swab negative.  Vancomycin discontinued  Antibiotic switched to ceftriaxone based on sensitivity result  Blood pressure has remained stable with MAP greater than 65  Remains intermittently febrile  Cont IVF support  GERD with esophagitis  -cont PPI  Bipolar disorder ([facility])  Continue Lamictal  Localization-related (focal) (partial) symptomatic epilepsy and epileptic syndromes with complex partial seizures, not intractable, with status epilepticus ([facility])  -cont Keppra  Influenza A  -cont Tamiflu  Left lower lobe pneumonia  -CT chest: Left lower lobe airspace consolidation and bilateral multi lobar tree-in-bud infiltrates may represent pneumonia and/or aspiration. Bilateral lower lobe segmental and subsegmental endobronchial opacification, left greater than right may represent mucous plugging and/or aspiration. Stable right upper lobe cystic lesion with mural nodularity and left upper lobe spiculated lesion unchanged since November 2020. Noncontrast chest CT follow-up in 12 months is advised.   -cont Rocephin  Acute cystitis with hematuria  -Required Anderson catheter placement for chronic bladder outlet obstruction and bilateral severe hydronephrosis.    -Urine culture with procidentia.    -cont Rocphein  JOHNY (acute kidney injury) ([facility])  -POA with a creatinine  of 6.36.  Prior creatinine in 2022 were 1.1-1.4.  Likely in the setting of septic shock with ATN.  Anderson catheter was placed in the emergency room.  -CT A/P on admission showing severe bilateral hydroureteronephrosis with distended bladder  -Anderson catheter was subsequently placed  -Repeat renal ultrasound shows mild to moderate dilatation of left pelvic calyceal system with severe hydronephrosis of the right kidney  -Patient seen by urology, continue Anderson catheter.  Consideration for suprapubic tube in the future  -cont IVF per nephro  Severe protein-calorie malnutrition (HCC)  Malnutrition Findings:   Adult Malnutrition type: Chronic illness  Adult Degree of Malnutrition: Other severe protein calorie malnutrition                     360 Statement: Pt presents with severe protein calorie malnutrition as evidenced by sunken orbitals, b/l temporal depressions and visble clavicle bone. Treat with diet and supplements BID.    BMI Findings:           Body mass index is 23.89 kg/m².   Encourage oral intake.  Nutrition input appreciated  Electrolyte abnormality  Multiple electrolyte abnormalities including hypokalemia hypomagnesemia and hypophosphatemia.    -2g IV Mg today  Other hydronephrosis  Repeat renal ultrasound results noted.    Urology consulted.    Maintain Anderson catheter for now.  Seizure disorder (HCC)  -cont Keppra  Anemia    Hypertension     VTE  Prophylaxis:   Pharmacologic: in place  Mechanical VTE Prophylaxis in Place: Yes    Patient Centered Rounds: I have performed bedside rounds with nursing staff today.    Discussions with Specialists or Other Care Team Provider: case management    Education and Discussions with Family / Patient: yes    Mobility:   Basic Mobility Inpatient Raw Score: 6  JH-HLM Goal: 2: Bed activities/Dependent transfer  JH-HLM Achieved: 2: Bed activities/Dependent transfer      Current Length of Stay: 6 day(s)    Current Patient Status: Inpatient        Code Status: Level 1 - Full  Code    Discharge Plan: Pt will require continued inpatient hospitalization.    Subjective:     Pt remains with elevated temperatures  He states overall feels better  Denies chills    Patient is seen and examined at bedside.  All other ROS are negative.    Objective:     Vitals:   Temp (24hrs), Av.3 °F (37.4 °C), Min:98.1 °F (36.7 °C), Max:100.2 °F (37.9 °C)    Temp:  [98.1 °F (36.7 °C)-100.2 °F (37.9 °C)] 98.8 °F (37.1 °C)  HR:  [93-94] 93  Resp:  [18] 18  BP: (112-133)/(73-75) 133/75  SpO2:  [94 %-97 %] 94 %  Body mass index is 23.89 kg/m².     Input and Output Summary (last 24 hours):       Intake/Output Summary (Last 24 hours) at 3/11/2025 1420  Last data filed at 3/11/2025 1200  Gross per 24 hour   Intake 725 ml   Output 1450 ml   Net -725 ml       Physical Exam:       GEN: No acute distress, comfortable, ill appearing  HEEENT: No JVD, PERRLA, no scleral icterus  RESP: Lungs clear to auscultation bilaterally  CV: RRR, +s1/s2   ABD: SOFT NON TENDER, POSITIVE BOWEL SOUNDS, NO DISTENTION  PSYCH: CALM  NEURO: Mentation baseline, NO FOCAL DEFICITS  SKIN: NO RASH  EXTREM: NO EDEMA    Additional Data:     Labs:    Results from last 7 days   Lab Units 25  0426 03/10/25  0337 25  0439 25  0516 25  0444   WBC Thousand/uL 6.03   < > 10.45*   < > 8.47   HEMOGLOBIN g/dL 7.9*   < > 8.7*   < > 10.2*   HEMATOCRIT % 25.0*   < > 27.8*   < > 30.5*   PLATELETS Thousands/uL 203   < > 263   < > 206   BANDS PCT %  --   --   --   --  5   SEGS PCT %  --   --  87*   < >  --    LYMPHO PCT % 20  --  7*   < > 13*   MONO PCT % 2*  --  3*   < > 0*   EOS PCT % 4  --  2   < > 0    < > = values in this interval not displayed.     Results from last 7 days   Lab Units 25  0426   SODIUM mmol/L 136   POTASSIUM mmol/L 4.1   CHLORIDE mmol/L 104   CO2 mmol/L 23   BUN mg/dL 18   CREATININE mg/dL 1.13   ANION GAP mmol/L 9   CALCIUM mg/dL 7.8*   ALBUMIN g/dL 2.2*   TOTAL BILIRUBIN mg/dL 0.35   ALK PHOS U/L 67   ALT U/L  21   AST U/L 31   GLUCOSE RANDOM mg/dL 90     Results from last 7 days   Lab Units 03/05/25  0611   INR  1.38*     Results from last 7 days   Lab Units 03/11/25  1245 03/11/25  0602 03/11/25  0051 03/10/25  1850 03/10/25  1217 03/10/25  0618 03/10/25  0023 03/09/25  1808 03/09/25  1145   POC GLUCOSE mg/dl 133 87 112 173* 128 104 96 109 105         Results from last 7 days   Lab Units 03/05/25  0444   LACTIC ACID mmol/L 1.1   PROCALCITONIN ng/ml 14.58*       Lines/Drains:  Invasive Devices       Peripheral Intravenous Line  Duration             Peripheral IV 03/09/25 Left Arm 2 days              Drain  Duration             Urethral Catheter Temperature probe 16 Fr. 6 days                    Telemetry:        * I Have Reviewed All Lab Data Listed Above.           Imaging:     Results for orders placed during the hospital encounter of 03/05/25    XR chest portable    Narrative  XR CHEST PORTABLE    INDICATION: fever.    COMPARISON: 11/13/2020 and CT scan from 11/14/2020    FINDINGS:    There is infiltrate in the left and right lower lung fields suggestive of pneumonia. The cystic lesion with internal nodule which was noted on the prior CT is not visible on plain film. The small spiculated nodule in the left lung which was mentioned on  the prior CT is also not readily apparent on plain film. No pneumothorax or pleural effusion.    Normal cardiomediastinal silhouette.    Bones are unremarkable for age.    Normal upper abdomen.    Impression  Bilateral infiltrates left greater than right suspicious for pneumonia.    The lesions seen on the prior CT are not visible on x-ray. Please refer to the report of the follow-up CT scan obtained after this x-ray for further details        Workstation performed: [id number]    No results found for this or any previous visit.      *I have reviewed all imaging reports listed above      Recent Cultures (last 7 days):     Results from last 7 days   Lab Units 03/05/25  1044 03/05/25  0628  03/05/25  0515 03/05/25  0444   BLOOD CULTURE   --   --  No Growth After 5 Days. No Growth After 5 Days.   URINE CULTURE   --  >100,000 cfu/ml Providencia rettgeri*  --   --    LEGIONELLA URINARY ANTIGEN  Negative  --   --   --        Last 24 Hours Medication List:   Current Facility-Administered Medications   Medication Dose Route Frequency Provider Last Rate    acetaminophen  650 mg Oral Q4H PRN NANI Roche      aspirin  81 mg Oral Daily NANI Roche      calcium carbonate  1,000 mg Oral BID PRN NANI Roche      cefTRIAXone  1,000 mg Intravenous Q24H Linda Koroma MD 1,000 mg (03/10/25 1958)    chlorhexidine  15 mL Mouth/Throat Q12H DANIELLE NANI Roche      cholecalciferol  2,000 Units Oral Daily NANI Roche      dextrose 5 % and sodium chloride 0.45 % with KCl 20 mEq/L  75 mL/hr Intravenous Continuous Rich Elaine MD 75 mL/hr (03/11/25 0429)    ferrous sulfate  325 mg Oral Daily With Breakfast NANI Roche      heparin (porcine)  5,000 Units Subcutaneous Q8H [facility] NANI Roche      ipratropium  0.5 mg Nebulization Q6H PRN Max 3/day Irene Powers MD      lamoTRIgine  200 mg Oral BID NANI Roche      levalbuterol  1.25 mg Nebulization Q6H PRN Irene Powers MD      levETIRAcetam  500 mg Intravenous Q12H [facility] Linda Koroma MD      melatonin  6 mg Oral HS NANI Roche      methimazole  5 mg Oral Daily Urbano Naidu MD      midodrine  10 mg Oral TID AC NANI Roche      mirtazapine  7.5 mg Oral HS NANI oRche      multivitamin stress formula  1 tablet Oral Daily NANI Roche      pantoprazole  20 mg Oral Early Morning NANI Roche      potassium-sodium phosphates  1 packet Oral Once Linda Koroma MD      senna-docusate sodium  2 tablet Oral Daily NANI Roche      tamsulosin  0.4 mg Oral Daily With Dinner NANI Roche      thiamine  100 mg Oral Daily  NANI Roche      zinc sulfate  220 mg Oral Daily NANI Roche          Today, Patient Was Seen By: Elise Church MD    ** Please Note: Dictation voice to text software may have been used in the creation of this document. **

## 2025-03-11 NOTE — SPEECH THERAPY NOTE
Speech Language/Pathology    Speech/Language Pathology Progress Note    Patient Name: Trevor Waldrop  Today's Date: 3/11/2025     Problem List  Principal Problem:    Septic shock (HCC)  Active Problems:    GERD with esophagitis    Anemia    Bipolar disorder (HCC)    Localization-related (focal) (partial) symptomatic epilepsy and epileptic syndromes with complex partial seizures, not intractable, with status epilepticus (HCC)    Hypertension    Influenza A    Left lower lobe pneumonia    Acute cystitis with hematuria    JOHNY (acute kidney injury) (HCC)    Severe protein-calorie malnutrition (HCC)    Electrolyte abnormality    Other hydronephrosis    Seizure disorder (HCC)       Past Medical History  Past Medical History:   Diagnosis Date    Alcohol abuse     Anemia 6/14/2019    On iron.  Unclear if ever documented iron deficient    GERD (gastroesophageal reflux disease)     GERD with esophagitis 6/14/2019    EGD in [facility] 2019    Hx of AKA (above knee amputation) (HCC)     Right    Infectious viral hepatitis     Seizures (HCC)     Seizures (HCC)     Ulcer of esophagus         Past Surgical History  Past Surgical History:   Procedure Laterality Date    BELOW KNEE LEG AMPUTATION      Left    EGD  04/25/2017    LUNG DECORTICATION Left        Updated History:  Pt last seen by SLP 3/10 recommending dysphagia 3/dental soft textures and thin liquids, meds as tolerated.     Updated Imaging:  No new imaging.    Subjective:  Pt asleep upon arrival, aroused via verbal stimuli. Pt agreeable to PO trials and HOB elevation. Breakfast tray present of dysphagia 3/dental soft textures and thin liquids. S/w RN who denies concerns for swallow function though reports pt w/ limited PO intake.     Objective:  Materials administered: dental soft textures and thin liquids- pt w/ limited PO trials - declining further trials and higher level trials of solids     Complete labial seal for retrieval and containment of all materials, no  anterior bolus loss present. Min prolonged palatal mashing of dental soft textures, ultimately functional. Complete bolus breakdown and adequate bolus transfer. No oral residue noted, no pocketing visualized. Pt w/ large bolus intake of thin liquids, SLP provided multiple verbal cues for reduced bolus size w/ inconsistent response/usage. Suspected delayed swallow initiation and reduced hyolaryngeal elevation to palpation. No overt s/s of aspiration at this time, no change in vocal quality, VSS.     Assessment:  Pt presents w/ s/s suggestive of mild-mod oral dysphagia, suspected min pharyngeal dysphagia characterized by descriptions above.    Plan/Recommendations:  Dysphagia 3/dental soft textures and thin liquids   Meds as tolerated   Swallow strategies: upright posture, slow rate, small bites/sips   Frequent/thorough oral care   ST to f/u for diet tolerance as able and appropriate

## 2025-03-11 NOTE — PLAN OF CARE
Problem: Potential for Falls  Goal: Patient will remain free of falls  Description: INTERVENTIONS:  - Educate patient/family on patient safety including physical limitations  - Instruct patient to call for assistance with activity   - Consult OT/PT to assist with strengthening/mobility   - Keep Call bell within reach  - Keep bed low and locked with side rails adjusted as appropriate  - Keep care items and personal belongings within reach  - Initiate and maintain comfort rounds  - Make Fall Risk Sign visible to staff  - Offer Toileting every x Hours, in advance of need  - Initiate/Maintain xalarm  - Obtain necessary fall risk management equipment: x  - Apply yellow socks and bracelet for high fall risk patients  - Consider moving patient to room near nurses station  3/11/2025 1117 by Adriana Valentino RN  Outcome: Progressing  3/11/2025 1116 by Adriana Valentino RN  Outcome: Not Progressing     Problem: Prexisting or High Potential for Compromised Skin Integrity  Goal: Skin integrity is maintained or improved  Description: INTERVENTIONS:  - Identify patients at risk for skin breakdown  - Assess and monitor skin integrity  - Assess and monitor nutrition and hydration status  - Monitor labs   - Assess for incontinence   - Turn and reposition patient  - Assist with mobility/ambulation  - Relieve pressure over bony prominences  - Avoid friction and shearing  - Provide appropriate hygiene as needed including keeping skin clean and dry  - Evaluate need for skin moisturizer/barrier cream  - Collaborate with interdisciplinary team   - Patient/family teaching  - Consider wound care consult   3/11/2025 1117 by Adriana Valentino RN  Outcome: Progressing  3/11/2025 1116 by Adriana Valentino RN  Outcome: Not Progressing     Problem: PAIN - ADULT  Goal: Verbalizes/displays adequate comfort level or baseline comfort level  Description: Interventions:  - Encourage patient to monitor pain and request assistance  - Assess pain using appropriate  pain scale  - Administer analgesics based on type and severity of pain and evaluate response  - Implement non-pharmacological measures as appropriate and evaluate response  - Consider cultural and social influences on pain and pain management  - Notify physician/advanced practitioner if interventions unsuccessful or patient reports new pain  3/11/2025 1117 by Adriana Valentino RN  Outcome: Progressing  3/11/2025 1116 by Adriana Valentino RN  Outcome: Not Progressing     Problem: INFECTION - ADULT  Goal: Absence or prevention of progression during hospitalization  Description: INTERVENTIONS:  - Assess and monitor for signs and symptoms of infection  - Monitor lab/diagnostic results  - Monitor all insertion sites, i.e. indwelling lines, tubes, and drains  - Monitor endotracheal if appropriate and nasal secretions for changes in amount and color  - Tenino appropriate cooling/warming therapies per order  - Administer medications as ordered  - Instruct and encourage patient and family to use good hand hygiene technique  - Identify and instruct in appropriate isolation precautions for identified infection/condition  3/11/2025 1117 by Adriana Valentino RN  Outcome: Progressing  3/11/2025 1116 by Adriana Valentino RN  Outcome: Not Progressing  Goal: Absence of fever/infection during neutropenic period  Description: INTERVENTIONS:  - Monitor WBC    3/11/2025 1117 by Adriana Valentino RN  Outcome: Progressing  3/11/2025 1116 by Adriana Valentino RN  Outcome: Not Progressing     Problem: SAFETY ADULT  Goal: Patient will remain free of falls  Description: INTERVENTIONS:  - Educate patient/family on patient safety including physical limitations  - Instruct patient to call for assistance with activity   - Consult OT/PT to assist with strengthening/mobility   - Keep Call bell within reach  - Keep bed low and locked with side rails adjusted as appropriate  - Keep care items and personal belongings within reach  - Initiate and maintain comfort rounds  -  Make Fall Risk Sign visible to staff  - Offer Toileting every xx Hours, in advance of need  - Initiate/Maintain xalarm  - Obtain necessary fall risk management equipment: x  - Apply yellow socks and bracelet for high fall risk patients  - Consider moving patient to room near nurses station  3/11/2025 1117 by Adriana Valentino RN  Outcome: Progressing  3/11/2025 1116 by Adriana Valentino RN  Outcome: Not Progressing  Goal: Maintain or return to baseline ADL function  Description: INTERVENTIONS:  -  Assess patient's ability to carry out ADLs; assess patient's baseline for ADL function and identify physical deficits which impact ability to perform ADLs (bathing, care of mouth/teeth, toileting, grooming, dressing, etc.)  - Assess/evaluate cause of self-care deficits   - Assess range of motion  - Assess patient's mobility; develop plan if impaired  - Assess patient's need for assistive devices and provide as appropriate  - Encourage maximum independence but intervene and supervise when necessary  - Involve family in performance of ADLs  - Assess for home care needs following discharge   - Consider OT consult to assist with ADL evaluation and planning for discharge  - Provide patient education as appropriate  3/11/2025 1117 by Adriana Valentino RN  Outcome: Progressing  3/11/2025 1116 by Adriana Valentino RN  Outcome: Not Progressing  Goal: Maintains/Returns to pre admission functional level  Description: INTERVENTIONS:  - Perform AM-PAC 6 Click Basic Mobility/ Daily Activity assessment daily.  - Set and communicate daily mobility goal to care team and patient/family/caregiver.   - Collaborate with rehabilitation services on mobility goals if consulted  - Perform Range of Motion x times a day.  - Reposition patient every x hours.  - Dangle patient x times a day  - Stand patient x times a day  - Ambulate patient x times a day  - Out of bed to chair x times a day   - Out of bed for meals xxxx times a day  - Out of bed for toileting  -  Record patient progress and toleration of activity level   3/11/2025 1117 by Adriana Valentino RN  Outcome: Progressing  3/11/2025 1116 by Adriana Valentino RN  Outcome: Not Progressing     Problem: DISCHARGE PLANNING  Goal: Discharge to home or other facility with appropriate resources  Description: INTERVENTIONS:  - Identify barriers to discharge w/patient and caregiver  - Arrange for needed discharge resources and transportation as appropriate  - Identify discharge learning needs (meds, wound care, etc.)  - Arrange for interpretive services to assist at discharge as needed  - Refer to Case Management Department for coordinating discharge planning if the patient needs post-hospital services based on physician/advanced practitioner order or complex needs related to functional status, cognitive ability, or social support system  3/11/2025 1117 by Adriana Valentino RN  Outcome: Progressing  3/11/2025 1116 by Adriana Valentino RN  Outcome: Not Progressing     Problem: Knowledge Deficit  Goal: Patient/family/caregiver demonstrates understanding of disease process, treatment plan, medications, and discharge instructions  Description: Complete learning assessment and assess knowledge base.  Interventions:  - Provide teaching at level of understanding  - Provide teaching via preferred learning methods  3/11/2025 1117 by Adriana Valentino RN  Outcome: Progressing  3/11/2025 1116 by Adriana Valentino RN  Outcome: Not Progressing     Problem: Nutrition/Hydration-ADULT  Goal: Nutrient/Hydration intake appropriate for improving, restoring or maintaining nutritional needs  Description: Monitor and assess patient's nutrition/hydration status for malnutrition. Collaborate with interdisciplinary team and initiate plan and interventions as ordered.  Monitor patient's weight and dietary intake as ordered or per policy. Utilize nutrition screening tool and intervene as necessary. Determine patient's food preferences and provide high-protein, high-caloric  foods as appropriate.     INTERVENTIONS:  - Monitor oral intake, urinary output, labs, and treatment plans  - Assess nutrition and hydration status and recommend course of action  - Evaluate amount of meals eaten  - Assist patient with eating if necessary   - Allow adequate time for meals  - Recommend/ encourage appropriate diets, oral nutritional supplements, and vitamin/mineral supplements  - Order, calculate, and assess calorie counts as needed  - Recommend, monitor, and adjust tube feedings and TPN/PPN based on assessed needs  - Assess need for intravenous fluids  - Provide specific nutrition/hydration education as appropriate  - Include patient/family/caregiver in decisions related to nutrition  3/11/2025 1117 by Adriana Valentino, RN  Outcome: Progressing  3/11/2025 1116 by Adriana Valentino, RN  Outcome: Not Progressing

## 2025-03-11 NOTE — PLAN OF CARE
Problem: Potential for Falls  Goal: Patient will remain free of falls  Description: INTERVENTIONS:  - Educate patient/family on patient safety including physical limitations  - Instruct patient to call for assistance with activity   - Consult OT/PT to assist with strengthening/mobility   - Keep Call bell within reach  - Keep bed low and locked with side rails adjusted as appropriate  - Keep care items and personal belongings within reach  - Initiate and maintain comfort rounds  - Make Fall Risk Sign visible to staff  - Offer Toileting every 2 Hours, in advance of need  - Initiate/Maintain bed alarm  - Obtain necessary fall risk management equipment  - Apply yellow socks and bracelet for high fall risk patients  - Consider moving patient to room near nurses station  Outcome: Progressing     Problem: Prexisting or High Potential for Compromised Skin Integrity  Goal: Skin integrity is maintained or improved  Description: INTERVENTIONS:  - Identify patients at risk for skin breakdown  - Assess and monitor skin integrity  - Assess and monitor nutrition and hydration status  - Monitor labs   - Assess for incontinence   - Turn and reposition patient  - Assist with mobility/ambulation  - Relieve pressure over bony prominences  - Avoid friction and shearing  - Provide appropriate hygiene as needed including keeping skin clean and dry  - Evaluate need for skin moisturizer/barrier cream  - Collaborate with interdisciplinary team   - Patient/family teaching  - Consider wound care consult   Outcome: Progressing     Problem: PAIN - ADULT  Goal: Verbalizes/displays adequate comfort level or baseline comfort level  Description: Interventions:  - Encourage patient to monitor pain and request assistance  - Assess pain using appropriate pain scale  - Administer analgesics based on type and severity of pain and evaluate response  - Implement non-pharmacological measures as appropriate and evaluate response  - Consider cultural and  social influences on pain and pain management  - Notify physician/advanced practitioner if interventions unsuccessful or patient reports new pain  Outcome: Progressing     Problem: INFECTION - ADULT  Goal: Absence or prevention of progression during hospitalization  Description: INTERVENTIONS:  - Assess and monitor for signs and symptoms of infection  - Monitor lab/diagnostic results  - Monitor all insertion sites, i.e. indwelling lines, tubes, and drains  - Monitor endotracheal if appropriate and nasal secretions for changes in amount and color  - [place] appropriate cooling/warming therapies per order  - Administer medications as ordered  - Instruct and encourage patient and family to use good hand hygiene technique  - Identify and instruct in appropriate isolation precautions for identified infection/condition  Outcome: Progressing  Goal: Absence of fever/infection during neutropenic period  Description: INTERVENTIONS:  - Monitor WBC    Outcome: Progressing     Problem: SAFETY ADULT  Goal: Patient will remain free of falls  Description: INTERVENTIONS:  - Educate patient/family on patient safety including physical limitations  - Instruct patient to call for assistance with activity   - Consult OT/PT to assist with strengthening/mobility   - Keep Call bell within reach  - Keep bed low and locked with side rails adjusted as appropriate  - Keep care items and personal belongings within reach  - Initiate and maintain comfort rounds  - Make Fall Risk Sign visible to staff  - Offer Toileting every 2 Hours, in advance of need  - Initiate/Maintain bed alarm  - Obtain necessary fall risk management equipment  - Apply yellow socks and bracelet for high fall risk patients  - Consider moving patient to room near nurses station  Outcome: Progressing  Goal: Maintain or return to baseline ADL function  Description: INTERVENTIONS:  -  Assess patient's ability to carry out ADLs; assess patient's baseline for ADL function and  identify physical deficits which impact ability to perform ADLs (bathing, care of mouth/teeth, toileting, grooming, dressing, etc.)  - Assess/evaluate cause of self-care deficits   - Assess range of motion  - Assess patient's mobility; develop plan if impaired  - Assess patient's need for assistive devices and provide as appropriate  - Encourage maximum independence but intervene and supervise when necessary  - Involve family in performance of ADLs  - Assess for home care needs following discharge   - Consider OT consult to assist with ADL evaluation and planning for discharge  - Provide patient education as appropriate  Outcome: Progressing  Goal: Maintains/Returns to pre admission functional level  Description: INTERVENTIONS:  - Perform AM-PAC 6 Click Basic Mobility/ Daily Activity assessment daily.  - Set and communicate daily mobility goal to care team and patient/family/caregiver.   - Collaborate with rehabilitation services on mobility goals if consulted  - Perform Range of Motion 4 times a day.  - Reposition patient every 2 hours.  - Dangle patient 3 times a day  - Stand patient 3 times a day  - Ambulate patient 3 times a day  - Out of bed to chair 3 times a day   - Out of bed for meals 3 times a day  - Out of bed for toileting  - Record patient progress and toleration of activity level   Outcome: Progressing     Problem: DISCHARGE PLANNING  Goal: Discharge to home or other facility with appropriate resources  Description: INTERVENTIONS:  - Identify barriers to discharge w/patient and caregiver  - Arrange for needed discharge resources and transportation as appropriate  - Identify discharge learning needs (meds, wound care, etc.)  - Arrange for interpretive services to assist at discharge as needed  - Refer to Case Management Department for coordinating discharge planning if the patient needs post-hospital services based on physician/advanced practitioner order or complex needs related to functional status,  cognitive ability, or social support system  Outcome: Progressing     Problem: Knowledge Deficit  Goal: Patient/family/caregiver demonstrates understanding of disease process, treatment plan, medications, and discharge instructions  Description: Complete learning assessment and assess knowledge base.  Interventions:  - Provide teaching at level of understanding  - Provide teaching via preferred learning methods  Outcome: Progressing     Problem: Nutrition/Hydration-ADULT  Goal: Nutrient/Hydration intake appropriate for improving, restoring or maintaining nutritional needs  Description: Monitor and assess patient's nutrition/hydration status for malnutrition. Collaborate with interdisciplinary team and initiate plan and interventions as ordered.  Monitor patient's weight and dietary intake as ordered or per policy. Utilize nutrition screening tool and intervene as necessary. Determine patient's food preferences and provide high-protein, high-caloric foods as appropriate.     INTERVENTIONS:  - Monitor oral intake, urinary output, labs, and treatment plans  - Assess nutrition and hydration status and recommend course of action  - Evaluate amount of meals eaten  - Assist patient with eating if necessary   - Allow adequate time for meals  - Recommend/ encourage appropriate diets, oral nutritional supplements, and vitamin/mineral supplements  - Order, calculate, and assess calorie counts as needed  - Recommend, monitor, and adjust tube feedings and TPN/PPN based on assessed needs  - Assess need for intravenous fluids  - Provide specific nutrition/hydration education as appropriate  - Include patient/family/caregiver in decisions related to nutrition  Outcome: Progressing

## 2025-03-11 NOTE — ASSESSMENT & PLAN NOTE
Malnutrition Findings:   Adult Malnutrition type: Chronic illness  Adult Degree of Malnutrition: Other severe protein calorie malnutrition                     360 Statement: Pt presents with severe protein calorie malnutrition as evidenced by sunken orbitals, b/l temporal depressions and visble clavicle bone. Treat with diet and supplements BID.    BMI Findings:           Body mass index is 23.89 kg/m².   Encourage oral intake.  Nutrition input appreciated

## 2025-03-12 LAB
ABO GROUP BLD: NORMAL
ALBUMIN SERPL BCG-MCNC: 2.5 G/DL (ref 3.5–5)
ALP SERPL-CCNC: 55 U/L (ref 34–104)
ALT SERPL W P-5'-P-CCNC: 17 U/L (ref 7–52)
ANION GAP SERPL CALCULATED.3IONS-SCNC: 7 MMOL/L (ref 4–13)
AST SERPL W P-5'-P-CCNC: 24 U/L (ref 13–39)
BASOPHILS # BLD AUTO: 0.01 THOUSANDS/ÂΜL (ref 0–0.1)
BASOPHILS NFR BLD AUTO: 0 % (ref 0–1)
BILIRUB SERPL-MCNC: 0.39 MG/DL (ref 0.2–1)
BLD GP AB SCN SERPL QL: NEGATIVE
BUN SERPL-MCNC: 17 MG/DL (ref 5–25)
CALCIUM ALBUM COR SERPL-MCNC: 9.1 MG/DL (ref 8.3–10.1)
CALCIUM SERPL-MCNC: 7.9 MG/DL (ref 8.4–10.2)
CHLORIDE SERPL-SCNC: 103 MMOL/L (ref 96–108)
CO2 SERPL-SCNC: 22 MMOL/L (ref 21–32)
CREAT SERPL-MCNC: 1.14 MG/DL (ref 0.6–1.3)
EOSINOPHIL # BLD AUTO: 0.1 THOUSAND/ÂΜL (ref 0–0.61)
EOSINOPHIL NFR BLD AUTO: 2 % (ref 0–6)
ERYTHROCYTE [DISTWIDTH] IN BLOOD BY AUTOMATED COUNT: 12.5 % (ref 11.6–15.1)
GFR SERPL CREATININE-BSD FRML MDRD: 69 ML/MIN/1.73SQ M
GLUCOSE SERPL-MCNC: 100 MG/DL (ref 65–140)
GLUCOSE SERPL-MCNC: 113 MG/DL (ref 65–140)
GLUCOSE SERPL-MCNC: 120 MG/DL (ref 65–140)
GLUCOSE SERPL-MCNC: 98 MG/DL (ref 65–140)
HCT VFR BLD AUTO: 21.2 % (ref 36.5–49.3)
HCT VFR BLD AUTO: 22.3 % (ref 36.5–49.3)
HGB BLD-MCNC: 6.6 G/DL (ref 12–17)
HGB BLD-MCNC: 6.9 G/DL (ref 12–17)
IMM GRANULOCYTES # BLD AUTO: 0.09 THOUSAND/UL (ref 0–0.2)
IMM GRANULOCYTES NFR BLD AUTO: 1 % (ref 0–2)
LYMPHOCYTES # BLD AUTO: 1.37 THOUSANDS/ÂΜL (ref 0.6–4.47)
LYMPHOCYTES NFR BLD AUTO: 21 % (ref 14–44)
MCH RBC QN AUTO: 31 PG (ref 26.8–34.3)
MCHC RBC AUTO-ENTMCNC: 31.1 G/DL (ref 31.4–37.4)
MCV RBC AUTO: 100 FL (ref 82–98)
MONOCYTES # BLD AUTO: 0.33 THOUSAND/ÂΜL (ref 0.17–1.22)
MONOCYTES NFR BLD AUTO: 5 % (ref 4–12)
NEUTROPHILS # BLD AUTO: 4.49 THOUSANDS/ÂΜL (ref 1.85–7.62)
NEUTS SEG NFR BLD AUTO: 71 % (ref 43–75)
NRBC BLD AUTO-RTO: 0 /100 WBCS
PLATELET # BLD AUTO: 182 THOUSANDS/UL (ref 149–390)
PMV BLD AUTO: 10.4 FL (ref 8.9–12.7)
POTASSIUM SERPL-SCNC: 4.1 MMOL/L (ref 3.5–5.3)
PROT SERPL-MCNC: 5.8 G/DL (ref 6.4–8.4)
RBC # BLD AUTO: 2.13 MILLION/UL (ref 3.88–5.62)
RH BLD: POSITIVE
SODIUM SERPL-SCNC: 132 MMOL/L (ref 135–147)
SPECIMEN EXPIRATION DATE: NORMAL
WBC # BLD AUTO: 6.39 THOUSAND/UL (ref 4.31–10.16)

## 2025-03-12 PROCEDURE — 30233N1 TRANSFUSION OF NONAUTOLOGOUS RED BLOOD CELLS INTO PERIPHERAL VEIN, PERCUTANEOUS APPROACH: ICD-10-PCS | Performed by: HOSPITALIST

## 2025-03-12 PROCEDURE — 86850 RBC ANTIBODY SCREEN: CPT | Performed by: HOSPITALIST

## 2025-03-12 PROCEDURE — 85014 HEMATOCRIT: CPT | Performed by: INTERNAL MEDICINE

## 2025-03-12 PROCEDURE — 86900 BLOOD TYPING SEROLOGIC ABO: CPT | Performed by: HOSPITALIST

## 2025-03-12 PROCEDURE — 82948 REAGENT STRIP/BLOOD GLUCOSE: CPT

## 2025-03-12 PROCEDURE — 86923 COMPATIBILITY TEST ELECTRIC: CPT

## 2025-03-12 PROCEDURE — 80053 COMPREHEN METABOLIC PANEL: CPT | Performed by: HOSPITALIST

## 2025-03-12 PROCEDURE — P9016 RBC LEUKOCYTES REDUCED: HCPCS

## 2025-03-12 PROCEDURE — 85025 COMPLETE CBC W/AUTO DIFF WBC: CPT | Performed by: HOSPITALIST

## 2025-03-12 PROCEDURE — 99232 SBSQ HOSP IP/OBS MODERATE 35: CPT | Performed by: HOSPITALIST

## 2025-03-12 PROCEDURE — 86901 BLOOD TYPING SEROLOGIC RH(D): CPT | Performed by: HOSPITALIST

## 2025-03-12 PROCEDURE — 85018 HEMOGLOBIN: CPT | Performed by: INTERNAL MEDICINE

## 2025-03-12 RX ORDER — DIPHENHYDRAMINE HCL 25 MG
25 TABLET ORAL ONCE AS NEEDED
Status: COMPLETED | OUTPATIENT
Start: 2025-03-12 | End: 2025-03-12

## 2025-03-12 RX ADMIN — CEFTRIAXONE 1000 MG: 1 INJECTION, SOLUTION INTRAVENOUS at 20:20

## 2025-03-12 RX ADMIN — TAMSULOSIN HYDROCHLORIDE 0.4 MG: 0.4 CAPSULE ORAL at 15:33

## 2025-03-12 RX ADMIN — HEPARIN SODIUM 5000 UNITS: 5000 INJECTION INTRAVENOUS; SUBCUTANEOUS at 13:29

## 2025-03-12 RX ADMIN — PANTOPRAZOLE SODIUM 20 MG: 20 TABLET, DELAYED RELEASE ORAL at 05:28

## 2025-03-12 RX ADMIN — MIRTAZAPINE 7.5 MG: 15 TABLET, FILM COATED ORAL at 21:40

## 2025-03-12 RX ADMIN — Medication 100 MG: at 08:40

## 2025-03-12 RX ADMIN — METHIMAZOLE 5 MG: 5 TABLET ORAL at 08:40

## 2025-03-12 RX ADMIN — FERROUS SULFATE TAB 325 MG (65 MG ELEMENTAL FE) 325 MG: 325 (65 FE) TAB at 08:38

## 2025-03-12 RX ADMIN — MIDODRINE HYDROCHLORIDE 10 MG: 5 TABLET ORAL at 15:33

## 2025-03-12 RX ADMIN — ASPIRIN 81 MG CHEWABLE TABLET 81 MG: 81 TABLET CHEWABLE at 08:39

## 2025-03-12 RX ADMIN — ACETAMINOPHEN 650 MG: 325 TABLET, FILM COATED ORAL at 08:38

## 2025-03-12 RX ADMIN — LAMOTRIGINE 200 MG: 100 TABLET ORAL at 17:15

## 2025-03-12 RX ADMIN — MIDODRINE HYDROCHLORIDE 10 MG: 5 TABLET ORAL at 12:33

## 2025-03-12 RX ADMIN — MIDODRINE HYDROCHLORIDE 10 MG: 5 TABLET ORAL at 05:28

## 2025-03-12 RX ADMIN — Medication 2000 UNITS: at 08:39

## 2025-03-12 RX ADMIN — DIPHENHYDRAMINE HYDROCHLORIDE 25 MG: 25 TABLET ORAL at 12:43

## 2025-03-12 RX ADMIN — ACETAMINOPHEN 650 MG: 325 TABLET, FILM COATED ORAL at 21:46

## 2025-03-12 RX ADMIN — B-COMPLEX W/ C & FOLIC ACID TAB 1 TABLET: TAB at 08:39

## 2025-03-12 RX ADMIN — SENNOSIDES AND DOCUSATE SODIUM 2 TABLET: 50; 8.6 TABLET ORAL at 08:38

## 2025-03-12 RX ADMIN — ZINC SULFATE 220 MG (50 MG) CAPSULE 220 MG: CAPSULE at 08:38

## 2025-03-12 RX ADMIN — LEVETIRACETAM 500 MG: 500 INJECTION, SOLUTION INTRAVENOUS at 10:03

## 2025-03-12 RX ADMIN — Medication 6 MG: at 21:39

## 2025-03-12 RX ADMIN — CHLORHEXIDINE GLUCONATE 15 ML: 1.2 RINSE ORAL at 08:47

## 2025-03-12 RX ADMIN — LAMOTRIGINE 200 MG: 100 TABLET ORAL at 08:39

## 2025-03-12 RX ADMIN — DEXTROSE, SODIUM CHLORIDE, AND POTASSIUM CHLORIDE 75 ML/HR: 5; .45; .15 INJECTION INTRAVENOUS at 17:56

## 2025-03-12 RX ADMIN — LEVETIRACETAM 500 MG: 500 INJECTION, SOLUTION INTRAVENOUS at 21:39

## 2025-03-12 RX ADMIN — HEPARIN SODIUM 5000 UNITS: 5000 INJECTION INTRAVENOUS; SUBCUTANEOUS at 05:28

## 2025-03-12 RX ADMIN — HEPARIN SODIUM 5000 UNITS: 5000 INJECTION INTRAVENOUS; SUBCUTANEOUS at 21:39

## 2025-03-12 NOTE — PLAN OF CARE
Problem: Potential for Falls  Goal: Patient will remain free of falls  Description: INTERVENTIONS:  - Educate patient/family on patient safety including physical limitations  - Instruct patient to call for assistance with activity   - Consult OT/PT to assist with strengthening/mobility   - Keep Call bell within reach  - Keep bed low and locked with side rails adjusted as appropriate  - Keep care items and personal belongings within reach  - Initiate and maintain comfort rounds  - Make Fall Risk Sign visible to staff  - Offer Toileting every  Hours, in advance of need  - Initiate/Maintain alarm  - Obtain necessary fall risk management equipment:   - Apply yellow socks and bracelet for high fall risk patients  - Consider moving patient to room near nurses station  Outcome: Progressing     Problem: Prexisting or High Potential for Compromised Skin Integrity  Goal: Skin integrity is maintained or improved  Description: INTERVENTIONS:  - Identify patients at risk for skin breakdown  - Assess and monitor skin integrity  - Assess and monitor nutrition and hydration status  - Monitor labs   - Assess for incontinence   - Turn and reposition patient  - Assist with mobility/ambulation  - Relieve pressure over bony prominences  - Avoid friction and shearing  - Provide appropriate hygiene as needed including keeping skin clean and dry  - Evaluate need for skin moisturizer/barrier cream  - Collaborate with interdisciplinary team   - Patient/family teaching  - Consider wound care consult   Outcome: Progressing     Problem: PAIN - ADULT  Goal: Verbalizes/displays adequate comfort level or baseline comfort level  Description: Interventions:  - Encourage patient to monitor pain and request assistance  - Assess pain using appropriate pain scale  - Administer analgesics based on type and severity of pain and evaluate response  - Implement non-pharmacological measures as appropriate and evaluate response  - Consider cultural and  social influences on pain and pain management  - Notify physician/advanced practitioner if interventions unsuccessful or patient reports new pain  Outcome: Progressing     Problem: INFECTION - ADULT  Goal: Absence or prevention of progression during hospitalization  Description: INTERVENTIONS:  - Assess and monitor for signs and symptoms of infection  - Monitor lab/diagnostic results  - Monitor all insertion sites, i.e. indwelling lines, tubes, and drains  - Monitor endotracheal if appropriate and nasal secretions for changes in amount and color  - [place] appropriate cooling/warming therapies per order  - Administer medications as ordered  - Instruct and encourage patient and family to use good hand hygiene technique  - Identify and instruct in appropriate isolation precautions for identified infection/condition  Outcome: Progressing  Goal: Absence of fever/infection during neutropenic period  Description: INTERVENTIONS:  - Monitor WBC    Outcome: Progressing     Problem: SAFETY ADULT  Goal: Patient will remain free of falls  Description: INTERVENTIONS:  - Educate patient/family on patient safety including physical limitations  - Instruct patient to call for assistance with activity   - Consult OT/PT to assist with strengthening/mobility   - Keep Call bell within reach  - Keep bed low and locked with side rails adjusted as appropriate  - Keep care items and personal belongings within reach  - Initiate and maintain comfort rounds  - Make Fall Risk Sign visible to staff  - Offer Toileting every  Hours, in advance of need  - Initiate/Maintain alarm  - Obtain necessary fall risk management equipment:   - Apply yellow socks and bracelet for high fall risk patients  - Consider moving patient to room near nurses station  Outcome: Progressing  Goal: Maintain or return to baseline ADL function  Description: INTERVENTIONS:  -  Assess patient's ability to carry out ADLs; assess patient's baseline for ADL function and  identify physical deficits which impact ability to perform ADLs (bathing, care of mouth/teeth, toileting, grooming, dressing, etc.)  - Assess/evaluate cause of self-care deficits   - Assess range of motion  - Assess patient's mobility; develop plan if impaired  - Assess patient's need for assistive devices and provide as appropriate  - Encourage maximum independence but intervene and supervise when necessary  - Involve family in performance of ADLs  - Assess for home care needs following discharge   - Consider OT consult to assist with ADL evaluation and planning for discharge  - Provide patient education as appropriate  Outcome: Progressing  Goal: Maintains/Returns to pre admission functional level  Description: INTERVENTIONS:  - Perform AM-PAC 6 Click Basic Mobility/ Daily Activity assessment daily.  - Set and communicate daily mobility goal to care team and patient/family/caregiver.   - Collaborate with rehabilitation services on mobility goals if consulted  - Perform Range of Motion  times a day.  - Reposition patient every hours.  - Dangle patient  times a day  - Stand patient  times a day  - Ambulate patient  times a day  - Out of bed to chair  times a day   - Out of bed for [place] times a day  - Out of bed for toileting  - Record patient progress and toleration of activity level   Outcome: Progressing     Problem: DISCHARGE PLANNING  Goal: Discharge to home or other facility with appropriate resources  Description: INTERVENTIONS:  - Identify barriers to discharge w/patient and caregiver  - Arrange for needed discharge resources and transportation as appropriate  - Identify discharge learning needs (meds, wound care, etc.)  - Arrange for interpretive services to assist at discharge as needed  - Refer to Case Management Department for coordinating discharge planning if the patient needs post-hospital services based on physician/advanced practitioner order or complex needs related to functional status, cognitive  ability, or social support system  Outcome: Progressing     Problem: Knowledge Deficit  Goal: Patient/family/caregiver demonstrates understanding of disease process, treatment plan, medications, and discharge instructions  Description: Complete learning assessment and assess knowledge base.  Interventions:  - Provide teaching at level of understanding  - Provide teaching via preferred learning methods  Outcome: Progressing     Problem: Nutrition/Hydration-ADULT  Goal: Nutrient/Hydration intake appropriate for improving, restoring or maintaining nutritional needs  Description: Monitor and assess patient's nutrition/hydration status for malnutrition. Collaborate with interdisciplinary team and initiate plan and interventions as ordered.  Monitor patient's weight and dietary intake as ordered or per policy. Utilize nutrition screening tool and intervene as necessary. Determine patient's food preferences and provide high-protein, high-caloric foods as appropriate.     INTERVENTIONS:  - Monitor oral intake, urinary output, labs, and treatment plans  - Assess nutrition and hydration status and recommend course of action  - Evaluate amount of meals eaten  - Assist patient with eating if necessary   - Allow adequate time for meals  - Recommend/ encourage appropriate diets, oral nutritional supplements, and vitamin/mineral supplements  - Order, calculate, and assess calorie counts as needed  - Recommend, monitor, and adjust tube feedings and TPN/PPN based on assessed needs  - Assess need for intravenous fluids  - Provide specific nutrition/hydration education as appropriate  - Include patient/family/caregiver in decisions related to nutrition  Outcome: Progressing

## 2025-03-12 NOTE — PROGRESS NOTES
Progress Note - Hospitalist   Name: Trevor Waldrop 60 y.o. male I MRN: [id number]  Unit/Bed#: -01 I Date of Admission: 3/5/2025   Date of Service: 3/12/2025 I Hospital Day: 7     Assessment & Plan  Septic shock ([facility])  Patient was admitted from nursing facility with fever and hypotension  Tested positive for influenza A and urine suggestive of urinary tract infection  Admission CT with left lower lobe consolidation  Met criteria for septic shock and required vasopressor support transiently.  Started on broad-spectrum antibiotic (vancomycin and cefepime) with Tamiflu for acute influenza  Blood cultures remain negative to date.  Urine culture with procidentia  MRSA swab negative.  Vancomycin discontinued  Antibiotic switched to ceftriaxone based on sensitivity result  Blood pressure has remained stable with MAP greater than 65  Remains intermittently febrile  Cont IVF support  GERD with esophagitis  -cont PPI  Bipolar disorder ([facility])  Continue Lamictal  Localization-related (focal) (partial) symptomatic epilepsy and epileptic syndromes with complex partial seizures, not intractable, with status epilepticus ([facility])  -cont Keppra  Influenza A  -cont Tamiflu  Left lower lobe pneumonia  -CT chest: Left lower lobe airspace consolidation and bilateral multi lobar tree-in-bud infiltrates may represent pneumonia and/or aspiration. Bilateral lower lobe segmental and subsegmental endobronchial opacification, left greater than right may represent mucous plugging and/or aspiration. Stable right upper lobe cystic lesion with mural nodularity and left upper lobe spiculated lesion unchanged since November 2020. Noncontrast chest CT follow-up in 12 months is advised.   -cont Rocephin  Acute cystitis with hematuria  -Required Anderson catheter placement for chronic bladder outlet obstruction and bilateral severe hydronephrosis.    -Urine culture with procidentia.    -cont Rocphein  JOHNY (acute kidney injury) ([facility])  -POA with a creatinine  of 6.36.  Prior creatinine in 2022 were 1.1-1.4.  Likely in the setting of septic shock with ATN.  Anderson catheter was placed in the emergency room.  -CT A/P on admission showing severe bilateral hydroureteronephrosis with distended bladder  -Anderson catheter was subsequently placed  -Repeat renal ultrasound shows mild to moderate dilatation of left pelvic calyceal system with severe hydronephrosis of the right kidney  -Patient seen by urology, continue Anderson catheter.  Consideration for suprapubic tube in the future  -cont IVF per nephro  Severe protein-calorie malnutrition (HCC)  Malnutrition Findings:   Adult Malnutrition type: Chronic illness  Adult Degree of Malnutrition: Other severe protein calorie malnutrition                     360 Statement: Pt presents with severe protein calorie malnutrition as evidenced by sunken orbitals, b/l temporal depressions and visble clavicle bone. Treat with diet and supplements BID.    BMI Findings:           Body mass index is 23.89 kg/m².   Encourage oral intake.  Nutrition input appreciated  Electrolyte abnormality  Multiple electrolyte abnormalities including hypokalemia hypomagnesemia and hypophosphatemia.    -2g IV Mg today  Other hydronephrosis  Repeat renal ultrasound results noted.    Urology consulted.    Maintain Anderosn catheter for now.  Seizure disorder (HCC)  -cont Keppra  Anemia  Transfuse 1 u PRBC  No evidence of acute loss  Maintain > 7  Hypertension     VTE  Prophylaxis:   Pharmacologic: in place  Mechanical VTE Prophylaxis in Place: Yes    Patient Centered Rounds: I have performed bedside rounds with nursing staff today.    Discussions with Specialists or Other Care Team Provider: case management    Education and Discussions with Family / Patient: yes    Mobility:   Basic Mobility Inpatient Raw Score: 6  -[facility] Goal: 2: Bed activities/Dependent transfer  -HL Achieved: 2: Bed activities/Dependent transfer      Current Length of Stay: 7 day(s)    Current  Patient Status: Inpatient        Code Status: Level 1 - Full Code    Discharge Plan: Pt will require continued inpatient hospitalization.    Subjective:         Patient is seen and examined at bedside.  All other ROS are negative.    Objective:     Vitals:   Temp (24hrs), Av.6 °F (37.6 °C), Min:99.1 °F (37.3 °C), Max:100.6 °F (38.1 °C)    Temp:  [99.1 °F (37.3 °C)-100.6 °F (38.1 °C)] 99.1 °F (37.3 °C)  HR:  [] 104  Resp:  [16-20] 20  BP: ()/(56-75) 118/75  SpO2:  [93 %-98 %] 93 %  Body mass index is 23.89 kg/m².     Input and Output Summary (last 24 hours):       Intake/Output Summary (Last 24 hours) at 3/12/2025 1030  Last data filed at 3/12/2025 0911  Gross per 24 hour   Intake 780 ml   Output 2350 ml   Net -1570 ml       Physical Exam:       GEN: No acute distress, comfortable, ill appearing  HEEENT: No JVD, PERRLA, no scleral icterus  RESP: Lungs clear to auscultation bilaterally  CV: RRR, +s1/s2   ABD: SOFT NON TENDER, POSITIVE BOWEL SOUNDS, NO DISTENTION  PSYCH: CALM  NEURO: Mentation baseline, NO FOCAL DEFICITS  SKIN: NO RASH  EXTREM: NO EDEMA    Additional Data:     Labs:    Results from last 7 days   Lab Units 25  0608 25  0227   WBC Thousand/uL  --  6.39   HEMOGLOBIN g/dL 6.9* 6.6*   HEMATOCRIT % 22.3* 21.2*   PLATELETS Thousands/uL  --  182   SEGS PCT %  --  71   LYMPHO PCT %  --  21   MONO PCT %  --  5   EOS PCT %  --  2     Results from last 7 days   Lab Units 25  0227   SODIUM mmol/L 132*   POTASSIUM mmol/L 4.1   CHLORIDE mmol/L 103   CO2 mmol/L 22   BUN mg/dL 17   CREATININE mg/dL 1.14   ANION GAP mmol/L 7   CALCIUM mg/dL 7.9*   ALBUMIN g/dL 2.5*   TOTAL BILIRUBIN mg/dL 0.39   ALK PHOS U/L 55   ALT U/L 17   AST U/L 24   GLUCOSE RANDOM mg/dL 98         Results from last 7 days   Lab Units 25  0602 25  2341 25  1807 25  1245 25  0602 25  0051 03/10/25  1850 03/10/25  1217 03/10/25  0618 03/10/25  0023 25  1808 25  1145    POC GLUCOSE mg/dl 100 95 134 133 87 112 173* 128 104 96 109 105               Lines/Drains:  Invasive Devices       Peripheral Intravenous Line  Duration             Peripheral IV 03/12/25 Distal;Left;Upper;Ventral (anterior) Antecubital <1 day              Drain  Duration             Urethral Catheter Temperature probe 16 Fr. 7 days                    Telemetry:        * I Have Reviewed All Lab Data Listed Above.           Imaging:     Results for orders placed during the hospital encounter of 03/05/25    XR chest portable    Narrative  XR CHEST PORTABLE    INDICATION: fever.    COMPARISON: 11/13/2020 and CT scan from 11/14/2020    FINDINGS:    There is infiltrate in the left and right lower lung fields suggestive of pneumonia. The cystic lesion with internal nodule which was noted on the prior CT is not visible on plain film. The small spiculated nodule in the left lung which was mentioned on  the prior CT is also not readily apparent on plain film. No pneumothorax or pleural effusion.    Normal cardiomediastinal silhouette.    Bones are unremarkable for age.    Normal upper abdomen.    Impression  Bilateral infiltrates left greater than right suspicious for pneumonia.    The lesions seen on the prior CT are not visible on x-ray. Please refer to the report of the follow-up CT scan obtained after this x-ray for further details        Workstation performed: [id number]    No results found for this or any previous visit.      *I have reviewed all imaging reports listed above      Recent Cultures (last 7 days):     Results from last 7 days   Lab Units 03/05/25  1044   LEGIONELLA URINARY ANTIGEN  Negative       Last 24 Hours Medication List:   Current Facility-Administered Medications   Medication Dose Route Frequency Provider Last Rate    acetaminophen  650 mg Oral Q4H PRN NANI Roche      aspirin  81 mg Oral Daily NANI Roche      calcium carbonate  1,000 mg Oral BID PRN Charlene Lantigua  NANI      cefTRIAXone  1,000 mg Intravenous Q24H Linda Koroma MD 1,000 mg (03/11/25 2048)    chlorhexidine  15 mL Mouth/Throat Q12H DANIELLE NANI Roche      cholecalciferol  2,000 Units Oral Daily NANI Roche      dextrose 5 % and sodium chloride 0.45 % with KCl 20 mEq/L  75 mL/hr Intravenous Continuous Rich Elaine MD 75 mL/hr (03/11/25 1911)    diphenhydrAMINE  25 mg Oral Once PRN Elise Church MD      ferrous sulfate  325 mg Oral Daily With Breakfast NANI Roche      heparin (porcine)  5,000 Units Subcutaneous Q8H DANIELLE NANI Roche      ipratropium  0.5 mg Nebulization Q6H PRN Max 3/day Irene Powers MD      lamoTRIgine  200 mg Oral BID NANI Roche      levalbuterol  1.25 mg Nebulization Q6H PRN Irene Powers MD      levETIRAcetam  500 mg Intravenous Q12H DANIELLE Linda Koroma MD      melatonin  6 mg Oral HS NANI Roche      methimazole  5 mg Oral Daily Urbano Naidu MD      midodrine  10 mg Oral TID AC NANI Roche      mirtazapine  7.5 mg Oral HS NANI Roche      multivitamin stress formula  1 tablet Oral Daily NANI Roche      pantoprazole  20 mg Oral Early Morning NANI Roche      potassium-sodium phosphates  1 packet Oral Once Linda Koroma MD      senna-docusate sodium  2 tablet Oral Daily NANI Roche      tamsulosin  0.4 mg Oral Daily With Dinner NANI Roche      thiamine  100 mg Oral Daily NANI Roche      zinc sulfate  220 mg Oral Daily NANI Roche          Today, Patient Was Seen By: Elise Church MD    ** Please Note: Dictation voice to text software may have been used in the creation of this document. **

## 2025-03-12 NOTE — UTILIZATION REVIEW
Continued Stay Review    Date: 3/12                          Current Patient Class: Inpatient  Current Level of Care: MEd/surg    HPI:60 y.o. male initially admitted on 3/5     Assessment/Plan:  Continue with IV fluids, IV abx.  Hgb 6.6. Give 1 unit PRBcs.  No acute signs of bleeding. Creat improved. Blood cultures remain neg. Urine culture with procidentia.     Medications:   Scheduled Medications:  aspirin, 81 mg, Oral, Daily  cefTRIAXone, 1,000 mg, Intravenous, Q24H  chlorhexidine, 15 mL, Mouth/Throat, Q12H DANIELLE  cholecalciferol, 2,000 Units, Oral, Daily  ferrous sulfate, 325 mg, Oral, Daily With Breakfast  heparin (porcine), 5,000 Units, Subcutaneous, Q8H DANIELLE  lamoTRIgine, 200 mg, Oral, BID  levETIRAcetam, 500 mg, Intravenous, Q12H DANIELLE  melatonin, 6 mg, Oral, HS  methimazole, 5 mg, Oral, Daily  midodrine, 10 mg, Oral, TID AC  mirtazapine, 7.5 mg, Oral, HS  multivitamin stress formula, 1 tablet, Oral, Daily  pantoprazole, 20 mg, Oral, Early Morning  potassium-sodium phosphates, 1 packet, Oral, Once  senna-docusate sodium, 2 tablet, Oral, Daily  tamsulosin, 0.4 mg, Oral, Daily With Dinner  thiamine, 100 mg, Oral, Daily  zinc sulfate, 220 mg, Oral, Daily      Continuous IV Infusions:  dextrose 5 % and sodium chloride 0.45 % with KCl 20 mEq/L, 75 mL/hr, Intravenous, Continuous      PRN Meds:  acetaminophen, 650 mg, Oral, Q4H PRN  calcium carbonate, 1,000 mg, Oral, BID PRN  ipratropium, 0.5 mg, Nebulization, Q6H PRN Max 3/day  levalbuterol, 1.25 mg, Nebulization, Q6H PRN      Discharge Plan: TBD    Vital Signs (last 3 days)       Date/Time Temp Pulse Resp BP MAP (mmHg) SpO2 Calculated FIO2 (%) - Nasal Cannula Nasal Cannula O2 Flow Rate (L/min) O2 Device Patient Position - Orthostatic VS [place] Coma Scale Score Pain    03/12/25 14:12:09 98.9 °F (37.2 °C) 89 15 95/58 70 96 % -- -- None (Room air) -- -- --    03/12/25 13:29:56 99 °F (37.2 °C) 95 16 94/57 69 96 % -- -- None (Room air) -- -- --    03/12/25 13:06:25 99 °F  (37.2 °C) 96 16 103/61 75 94 % -- -- -- -- -- --    03/12/25 13:05:05 -- 96 -- 94/58 70 98 % -- -- -- -- -- --    03/12/25 1255 99.1 °F (37.3 °C) 97 17 94/58 -- -- -- -- -- -- -- --    03/12/25 1238 -- 99 -- 96/50 68 92 % -- -- -- -- -- --    03/12/25 12:34:32 99.1 °F (37.3 °C) 97 19 90/57 68 96 % -- -- -- -- -- --    03/12/25 12:32:36 -- 95 -- 73/41 52 96 % -- -- -- -- -- --    03/12/25 12:32:24 -- 98 -- 73/41 52 96 % -- -- -- -- -- --    03/12/25 0911 -- -- -- -- -- -- -- -- -- -- 15 --    03/12/25 0838 -- -- -- -- -- -- -- -- -- -- -- 5    03/12/25 07:29:35 99.1 °F (37.3 °C) 104 20 118/75 89 93 % -- -- None (Room air) -- -- --    03/12/25 05:32:35 -- 100 -- 111/68 82 96 % -- -- -- -- -- --    03/12/25 0500 99.3 °F (37.4 °C) -- -- -- -- -- -- -- -- -- -- --    03/12/25 00:22:59 99.6 °F (37.6 °C) 91 -- 117/66 83 94 % -- -- -- -- -- --    03/11/25 2300 -- -- -- -- -- -- -- -- None (Room air) -- 15 --    03/11/25 22:35:23 100.6 °F (38.1 °C) 94 20 95/56 69 94 % -- -- -- -- -- --    03/11/25 22:22:37 -- 101 16 -- 67 93 % -- -- -- -- -- --    03/11/25 2142 -- -- -- -- -- -- -- -- -- -- -- 4    03/11/25 17:31:13 -- 101 -- 125/66 86 98 % -- -- -- -- -- --    03/11/25 15:05:10 99.7 °F (37.6 °C) 88 -- 108/62 77 97 % -- -- -- -- -- --    03/11/25 1500 99.1 °F (37.3 °C) -- -- -- -- -- -- -- -- -- -- --    03/11/25 1138 -- 95 20 101/57 -- -- -- -- -- Lying -- --    03/11/25 1106 -- -- -- -- -- 94 % -- -- None (Room air) -- 15 No Pain    03/11/25 0735 -- 89 20 111/70 84 97 % -- -- -- Lying -- --    03/11/25 0105 98.8 °F (37.1 °C) -- -- -- -- -- -- -- -- -- -- --    03/10/25 2343 -- -- -- -- -- -- -- -- None (Room air) -- 15 --    03/10/25 2312 -- -- -- -- -- -- -- -- -- -- -- 2    03/10/25 2309 100 °F (37.8 °C) -- -- -- -- -- -- -- -- -- -- --    03/10/25 2303 -- 93 18 133/75 94 96 % -- -- -- -- -- --    03/10/25 2128 100.2 °F (37.9 °C) -- -- -- -- -- -- -- -- -- -- --    03/10/25 2029 99.2 °F (37.3 °C) -- -- -- -- -- -- -- --  -- -- --    03/10/25 1600 98.1 °F (36.7 °C) -- -- -- -- -- -- -- -- -- -- --    03/10/25 1521 -- 94 -- 112/73 86 97 % -- -- -- -- -- --    03/10/25 1208 -- 99 -- 134/76 95 96 % -- -- -- -- -- --    03/10/25 1133 -- -- -- -- -- 97 % -- -- None (Room air) -- 15 No Pain    03/10/25 07:52:41 -- 98 -- 113/74 87 96 % -- -- -- Lying -- --    03/10/25 0300 99.8 °F (37.7 °C) -- -- -- -- -- -- -- -- -- 15 --    03/09/25 2326 100.5 °F (38.1 °C) -- -- -- -- -- -- -- -- -- -- --    03/09/25 22:33:44 100.4 °F (38 °C) 104 -- 130/76 94 97 % -- -- -- -- -- No Pain    03/09/25 2000 -- -- -- -- -- -- -- -- -- -- 14 --    03/09/25 15:20:46 99.4 °F (37.4 °C) 101 18 107/70 82 98 % -- -- -- -- -- --    03/09/25 1015 -- -- -- -- -- -- 28 2 L/min Nasal cannula -- 14 No Pain    03/09/25 09:55:36 -- 107 -- 115/72 86 94 % -- -- -- -- -- --    03/09/25 0300 98.8 °F (37.1 °C) -- -- -- -- -- -- -- -- -- -- --    03/09/25 00:11:14 100.4 °F (38 °C) 105 -- 104/66 79 96 % -- -- -- -- -- --          Weight (last 2 days)       Date/Time Weight    03/11/25 0531 43.3 (95.46)    03/10/25 0635 43.4 (95.68)     Weight: Notified Dr Church at rounds of 6 pound weight loss at 03/10/25 0635            Pertinent Labs/Diagnostic Results:   Radiology:  VAS upper limb venous duplex scan, unilateral/limited   Final Interpretation by Enoc Ocampo MD (03/10 1306)      US kidney and bladder   Final Interpretation by Abby South MD (03/07 1021)   Mild to moderate dilatation of the left pelvicalyceal system, decreased from the previous study of March 5, 2025      Severe hydronephrosis right kidney with cortical thinning likely sequela of chronic bladder outlet obstruction               Workstation performed: [id number]         CT chest abdomen pelvis wo contrast   Final Interpretation by Domo Holm MD (03/05 2010)      CT chest:      Left lower lobe airspace consolidation and bilateral multi lobar tree-in-bud infiltrates may represent  pneumonia and/or aspiration.      Bilateral lower lobe segmental and subsegmental endobronchial opacification, left greater than right may represent mucous plugging and/or aspiration.      Stable right upper lobe cystic lesion with mural nodularity and left upper lobe spiculated lesion unchanged since November 2020. Noncontrast chest CT follow-up in 12 months is advised.      Additional chronic findings and negatives as above.      CT abdomen and pelvis:      Severe bilateral hydronephroureterosis extending through the distended bladder. No radiopaque urinary tract calculi or perinephric collection. Findings suggestive of acute on chronic urinary retention. Correlate with UA to exclude superimposed cystitis.      Cholelithiasis.      Additional chronic findings and negatives as above.      The study was marked in EPIC for immediate notification.            Workstation performed: [id number]         XR chest portable   Final Interpretation by Chiki Cruz MD (03/05 1017)      Bilateral infiltrates left greater than right suspicious for pneumonia.      The lesions seen on the prior CT are not visible on x-ray. Please refer to the report of the follow-up CT scan obtained after this x-ray for further details            Workstation performed: [id number]           Cardiology:  Echo complete w/ contrast if indicated   Final Result by Hubert Gupta MD (03/07 1038)        Left Ventricle: Left ventricular cavity size is normal. Wall thickness    is normal. The left ventricular ejection fraction is 55%. Systolic    function is normal. Wall motion is normal. Diastolic function is normal.     Mitral Valve: There is trace regurgitation.         ECG 12 lead   Final Result by Hubert Gupta MD (03/05 0775)   Sinus tachycardia   Otherwise normal ECG   When compared with ECG of 15-Nov-2020 08:14,   No significant change was found   Confirmed by Hubert Gupta [id number]) on 3/5/2025 2:55:35 PM          Results from last 7  days   Lab Units 03/12/25  0608 03/12/25  0227 03/11/25  0426 03/10/25  0337 03/09/25  0439 03/08/25  0927   WBC Thousand/uL  --  6.39 6.03 8.11 10.45* 9.69   HEMOGLOBIN g/dL 6.9* 6.6* 7.9* 7.8* 8.7* 8.6*   HEMATOCRIT % 22.3* 21.2* 25.0* 24.7* 27.8* 27.9*   PLATELETS Thousands/uL  --  182 203 226 263 193   TOTAL NEUT ABS Thousands/µL  --  4.49  --   --  9.09* 8.48*         Results from last 7 days   Lab Units 03/12/25  0227 03/11/25  0426 03/10/25  0337 03/09/25  0439 03/08/25  0511 03/07/25  0526 03/06/25  1223 03/06/25  0516   SODIUM mmol/L 132* 136 143 148* 145 144   < > 141   POTASSIUM mmol/L 4.1 4.1 3.3* 3.5 4.1 3.1*   < > 3.7   CHLORIDE mmol/L 103 104 110* 109* 111* 105   < > 114*   CO2 mmol/L 22 23 24 27 22 26   < > 13*   ANION GAP mmol/L 7 9 9 12 12 13   < > 14*   BUN mg/dL 17 18 22 28* 36* 50*   < > 86*   CREATININE mg/dL 1.14 1.13 1.27 1.50* 1.65* 2.22*   < > 3.68*   EGFR ml/min/1.73sq m 69 70 61 49 44 31   < > 16   CALCIUM mg/dL 7.9* 7.8* 7.9* 8.2* 7.8* 8.2*   < > 8.3*   MAGNESIUM mg/dL  --  1.3* 1.9 1.8* 2.1 1.8*  --  2.3   PHOSPHORUS mg/dL  --  1.9* 2.2*  --  3.0 1.5*  --  4.2*    < > = values in this interval not displayed.     Results from last 7 days   Lab Units 03/12/25 0227 03/11/25 0426 03/06/25  0516   AST U/L 24 31 28   ALT U/L 17 21 17   ALK PHOS U/L 55 67 50   TOTAL PROTEIN g/dL 5.8* 6.0* 6.4   ALBUMIN g/dL 2.5* 2.2* 2.9*   TOTAL BILIRUBIN mg/dL 0.39 0.35 0.38     Results from last 7 days   Lab Units 03/12/25  1142 03/12/25  0602 03/11/25  2341 03/11/25  1807 03/11/25  1245 03/11/25  0602 03/11/25  0051 03/10/25  1850 03/10/25  1217 03/10/25  0618 03/10/25  0023 03/09/25  1808   POC GLUCOSE mg/dl 120 100 95 134 133 87 112 173* 128 104 96 109     Results from last 7 days   Lab Units 03/12/25  0227 03/11/25  0426 03/10/25  0337 03/09/25  0439 03/08/25  0511 03/07/25  0526 03/06/25  1223 03/06/25  0516   GLUCOSE RANDOM mg/dL 98 90 104 101 91 128 119 92           Results from last 7 days   Lab  Units 03/07/25  0526   TSH 3RD GENERATON uIU/mL 0.143*       Results from last 7 days   Lab Units 03/10/25  0337   FERRITIN ng/mL 526*   IRON ug/dL 10*     Results from last 7 days   Lab Units 03/10/25  0337   TRANSFERRIN mg/dL <75*     Results from last 7 days   Lab Units 03/12/25  1245   UNIT PRODUCT CODE  [id number]   UNIT NUMBER  [id number]   UNITABO  O   UNITRH  POS   CROSSMATCH  Compatible   UNIT DISPENSE STATUS  Issued   UNIT PRODUCT VOL ml 300               Results from last 7 days   Lab Units 03/07/25  0526 03/06/25  0516   VANCOMYCIN RM ug/mL 13.4 12.5       Network Utilization Review Department  ATTENTION: Please call with any questions or concerns to [phone] and carefully listen to the prompts so that you are directed to the right person. All voicemails are confidential.   For Discharge needs, contact Care Management DC Support Team at [phone] opt. [phone]  Send all requests for admission clinical reviews, approved or denied determinations and any other requests to dedicated fax number below belonging to the [facility] where the patient is receiving treatment. List of dedicated fax numbers for the Facilities:  FACILITY NAME UR FAX NUMBER   ADMISSION DENIALS (Administrative/Medical Necessity) [phone]   DISCHARGE SUPPORT TEAM (NETWORK) [phone]   PARENT CHILD HEALTH (Maternity/NICU/Pediatrics) [phone]   [facility] [phone]   [facility] [phone]   [facility] [phone]   [facility] [phone]   [facility] [phone]   [facility] [phone]   [facility] [phone]   [facility] [phone]   [facility] [phone]   Critical access hospital [phone]   General acute hospital [phone]   [facility]  [facility] ([place], [place]) [phone]

## 2025-03-12 NOTE — PLAN OF CARE
Problem: Potential for Falls  Goal: Patient will remain free of falls  Description: INTERVENTIONS:  - Educate patient/family on patient safety including physical limitations  - Instruct patient to call for assistance with activity   - Consult OT/PT to assist with strengthening/mobility   - Keep Call bell within reach  - Keep bed low and locked with side rails adjusted as appropriate  - Keep care items and personal belongings within reach  - Initiate and maintain comfort rounds  - Make Fall Risk Sign visible to staff  - Offer Toileting every 2 Hours, in advance of need  - Initiate/Maintain bed alarm  - Obtain necessary fall risk management equipment  - Apply yellow socks and bracelet for high fall risk patients  - Consider moving patient to room near nurses station  Outcome: Progressing     Problem: Prexisting or High Potential for Compromised Skin Integrity  Goal: Skin integrity is maintained or improved  Description: INTERVENTIONS:  - Identify patients at risk for skin breakdown  - Assess and monitor skin integrity  - Assess and monitor nutrition and hydration status  - Monitor labs   - Assess for incontinence   - Turn and reposition patient  - Assist with mobility/ambulation  - Relieve pressure over bony prominences  - Avoid friction and shearing  - Provide appropriate hygiene as needed including keeping skin clean and dry  - Evaluate need for skin moisturizer/barrier cream  - Collaborate with interdisciplinary team   - Patient/family teaching  - Consider wound care consult   Outcome: Progressing     Problem: PAIN - ADULT  Goal: Verbalizes/displays adequate comfort level or baseline comfort level  Description: Interventions:  - Encourage patient to monitor pain and request assistance  - Assess pain using appropriate pain scale  - Administer analgesics based on type and severity of pain and evaluate response  - Implement non-pharmacological measures as appropriate and evaluate response  - Consider cultural and  social influences on pain and pain management  - Notify physician/advanced practitioner if interventions unsuccessful or patient reports new pain  Outcome: Progressing     Problem: INFECTION - ADULT  Goal: Absence or prevention of progression during hospitalization  Description: INTERVENTIONS:  - Assess and monitor for signs and symptoms of infection  - Monitor lab/diagnostic results  - Monitor all insertion sites, i.e. indwelling lines, tubes, and drains  - Monitor endotracheal if appropriate and nasal secretions for changes in amount and color  - [place] appropriate cooling/warming therapies per order  - Administer medications as ordered  - Instruct and encourage patient and family to use good hand hygiene technique  - Identify and instruct in appropriate isolation precautions for identified infection/condition  Outcome: Progressing  Goal: Absence of fever/infection during neutropenic period  Description: INTERVENTIONS:  - Monitor WBC    Outcome: Progressing     Problem: SAFETY ADULT  Goal: Patient will remain free of falls  Description: INTERVENTIONS:  - Educate patient/family on patient safety including physical limitations  - Instruct patient to call for assistance with activity   - Consult OT/PT to assist with strengthening/mobility   - Keep Call bell within reach  - Keep bed low and locked with side rails adjusted as appropriate  - Keep care items and personal belongings within reach  - Initiate and maintain comfort rounds  - Make Fall Risk Sign visible to staff  - Offer Toileting every 2 Hours, in advance of need  - Initiate/Maintain bed alarm  - Obtain necessary fall risk management equipment  - Apply yellow socks and bracelet for high fall risk patients  - Consider moving patient to room near nurses station  Outcome: Progressing  Goal: Maintain or return to baseline ADL function  Description: INTERVENTIONS:  -  Assess patient's ability to carry out ADLs; assess patient's baseline for ADL function and  identify physical deficits which impact ability to perform ADLs (bathing, care of mouth/teeth, toileting, grooming, dressing, etc.)  - Assess/evaluate cause of self-care deficits   - Assess range of motion  - Assess patient's mobility; develop plan if impaired  - Assess patient's need for assistive devices and provide as appropriate  - Encourage maximum independence but intervene and supervise when necessary  - Involve family in performance of ADLs  - Assess for home care needs following discharge   - Consider OT consult to assist with ADL evaluation and planning for discharge  - Provide patient education as appropriate  Outcome: Progressing  Goal: Maintains/Returns to pre admission functional level  Description: INTERVENTIONS:  - Perform AM-PAC 6 Click Basic Mobility/ Daily Activity assessment daily.  - Set and communicate daily mobility goal to care team and patient/family/caregiver.   - Collaborate with rehabilitation services on mobility goals if consulted  - Perform Range of Motion 4 times a day.  - Reposition patient every 2 hours.  - Dangle patient 3 times a day  - Stand patient 3 times a day  - Ambulate patient 3 times a day  - Out of bed to chair 3 times a day   - Out of bed for meals 3 times a day  - Out of bed for toileting  - Record patient progress and toleration of activity level   Outcome: Progressing     Problem: DISCHARGE PLANNING  Goal: Discharge to home or other facility with appropriate resources  Description: INTERVENTIONS:  - Identify barriers to discharge w/patient and caregiver  - Arrange for needed discharge resources and transportation as appropriate  - Identify discharge learning needs (meds, wound care, etc.)  - Arrange for interpretive services to assist at discharge as needed  - Refer to Case Management Department for coordinating discharge planning if the patient needs post-hospital services based on physician/advanced practitioner order or complex needs related to functional status,  cognitive ability, or social support system  Outcome: Progressing     Problem: Knowledge Deficit  Goal: Patient/family/caregiver demonstrates understanding of disease process, treatment plan, medications, and discharge instructions  Description: Complete learning assessment and assess knowledge base.  Interventions:  - Provide teaching at level of understanding  - Provide teaching via preferred learning methods  Outcome: Progressing     Problem: Nutrition/Hydration-ADULT  Goal: Nutrient/Hydration intake appropriate for improving, restoring or maintaining nutritional needs  Description: Monitor and assess patient's nutrition/hydration status for malnutrition. Collaborate with interdisciplinary team and initiate plan and interventions as ordered.  Monitor patient's weight and dietary intake as ordered or per policy. Utilize nutrition screening tool and intervene as necessary. Determine patient's food preferences and provide high-protein, high-caloric foods as appropriate.     INTERVENTIONS:  - Monitor oral intake, urinary output, labs, and treatment plans  - Assess nutrition and hydration status and recommend course of action  - Evaluate amount of meals eaten  - Assist patient with eating if necessary   - Allow adequate time for meals  - Recommend/ encourage appropriate diets, oral nutritional supplements, and vitamin/mineral supplements  - Order, calculate, and assess calorie counts as needed  - Recommend, monitor, and adjust tube feedings and TPN/PPN based on assessed needs  - Assess need for intravenous fluids  - Provide specific nutrition/hydration education as appropriate  - Include patient/family/caregiver in decisions related to nutrition  Outcome: Progressing

## 2025-03-13 LAB
ABO GROUP BLD BPU: NORMAL
ALBUMIN SERPL BCG-MCNC: 2.6 G/DL (ref 3.5–5)
ALP SERPL-CCNC: 63 U/L (ref 34–104)
ALT SERPL W P-5'-P-CCNC: 18 U/L (ref 7–52)
ANION GAP SERPL CALCULATED.3IONS-SCNC: 7 MMOL/L (ref 4–13)
AST SERPL W P-5'-P-CCNC: 23 U/L (ref 13–39)
BASOPHILS # BLD AUTO: 0.02 THOUSANDS/ÂΜL (ref 0–0.1)
BASOPHILS NFR BLD AUTO: 0 % (ref 0–1)
BILIRUB SERPL-MCNC: 0.34 MG/DL (ref 0.2–1)
BPU ID: NORMAL
BUN SERPL-MCNC: 15 MG/DL (ref 5–25)
CALCIUM ALBUM COR SERPL-MCNC: 9.5 MG/DL (ref 8.3–10.1)
CALCIUM SERPL-MCNC: 8.4 MG/DL (ref 8.4–10.2)
CHLORIDE SERPL-SCNC: 97 MMOL/L (ref 96–108)
CO2 SERPL-SCNC: 26 MMOL/L (ref 21–32)
CREAT SERPL-MCNC: 1.13 MG/DL (ref 0.6–1.3)
CROSSMATCH: NORMAL
EOSINOPHIL # BLD AUTO: 0.07 THOUSAND/ÂΜL (ref 0–0.61)
EOSINOPHIL NFR BLD AUTO: 1 % (ref 0–6)
ERYTHROCYTE [DISTWIDTH] IN BLOOD BY AUTOMATED COUNT: 14.3 % (ref 11.6–15.1)
GFR SERPL CREATININE-BSD FRML MDRD: 70 ML/MIN/1.73SQ M
GLUCOSE SERPL-MCNC: 103 MG/DL (ref 65–140)
GLUCOSE SERPL-MCNC: 110 MG/DL (ref 65–140)
GLUCOSE SERPL-MCNC: 122 MG/DL (ref 65–140)
GLUCOSE SERPL-MCNC: 125 MG/DL (ref 65–140)
GLUCOSE SERPL-MCNC: 92 MG/DL (ref 65–140)
GLUCOSE SERPL-MCNC: 93 MG/DL (ref 65–140)
HCT VFR BLD AUTO: 28.4 % (ref 36.5–49.3)
HGB BLD-MCNC: 9.3 G/DL (ref 12–17)
IMM GRANULOCYTES # BLD AUTO: 0.13 THOUSAND/UL (ref 0–0.2)
IMM GRANULOCYTES NFR BLD AUTO: 2 % (ref 0–2)
LYMPHOCYTES # BLD AUTO: 1.01 THOUSANDS/ÂΜL (ref 0.6–4.47)
LYMPHOCYTES NFR BLD AUTO: 15 % (ref 14–44)
MCH RBC QN AUTO: 31.1 PG (ref 26.8–34.3)
MCHC RBC AUTO-ENTMCNC: 32.7 G/DL (ref 31.4–37.4)
MCV RBC AUTO: 95 FL (ref 82–98)
MONOCYTES # BLD AUTO: 0.42 THOUSAND/ÂΜL (ref 0.17–1.22)
MONOCYTES NFR BLD AUTO: 6 % (ref 4–12)
NEUTROPHILS # BLD AUTO: 5.01 THOUSANDS/ÂΜL (ref 1.85–7.62)
NEUTS SEG NFR BLD AUTO: 76 % (ref 43–75)
NRBC BLD AUTO-RTO: 0 /100 WBCS
PLATELET # BLD AUTO: 243 THOUSANDS/UL (ref 149–390)
PMV BLD AUTO: 10.4 FL (ref 8.9–12.7)
POTASSIUM SERPL-SCNC: 4.3 MMOL/L (ref 3.5–5.3)
PROT SERPL-MCNC: 6.6 G/DL (ref 6.4–8.4)
RBC # BLD AUTO: 2.99 MILLION/UL (ref 3.88–5.62)
SODIUM SERPL-SCNC: 130 MMOL/L (ref 135–147)
UNIT DISPENSE STATUS: NORMAL
UNIT PRODUCT CODE: NORMAL
UNIT PRODUCT VOLUME: 300 ML
UNIT RH: NORMAL
WBC # BLD AUTO: 6.66 THOUSAND/UL (ref 4.31–10.16)

## 2025-03-13 PROCEDURE — 99232 SBSQ HOSP IP/OBS MODERATE 35: CPT | Performed by: HOSPITALIST

## 2025-03-13 PROCEDURE — 85025 COMPLETE CBC W/AUTO DIFF WBC: CPT | Performed by: HOSPITALIST

## 2025-03-13 PROCEDURE — 82948 REAGENT STRIP/BLOOD GLUCOSE: CPT

## 2025-03-13 PROCEDURE — 80053 COMPREHEN METABOLIC PANEL: CPT | Performed by: HOSPITALIST

## 2025-03-13 RX ADMIN — Medication 100 MG: at 07:56

## 2025-03-13 RX ADMIN — HEPARIN SODIUM 5000 UNITS: 5000 INJECTION INTRAVENOUS; SUBCUTANEOUS at 21:08

## 2025-03-13 RX ADMIN — LAMOTRIGINE 200 MG: 100 TABLET ORAL at 17:12

## 2025-03-13 RX ADMIN — ACETAMINOPHEN 650 MG: 325 TABLET, FILM COATED ORAL at 08:18

## 2025-03-13 RX ADMIN — MIDODRINE HYDROCHLORIDE 10 MG: 5 TABLET ORAL at 05:22

## 2025-03-13 RX ADMIN — ACETAMINOPHEN 650 MG: 325 TABLET, FILM COATED ORAL at 19:52

## 2025-03-13 RX ADMIN — LEVETIRACETAM 500 MG: 500 INJECTION, SOLUTION INTRAVENOUS at 07:56

## 2025-03-13 RX ADMIN — Medication 2000 UNITS: at 07:56

## 2025-03-13 RX ADMIN — MIRTAZAPINE 7.5 MG: 15 TABLET, FILM COATED ORAL at 21:04

## 2025-03-13 RX ADMIN — DEXTROSE, SODIUM CHLORIDE, AND POTASSIUM CHLORIDE 75 ML/HR: 5; .45; .15 INJECTION INTRAVENOUS at 05:26

## 2025-03-13 RX ADMIN — CHLORHEXIDINE GLUCONATE 15 ML: 1.2 RINSE ORAL at 21:15

## 2025-03-13 RX ADMIN — B-COMPLEX W/ C & FOLIC ACID TAB 1 TABLET: TAB at 07:56

## 2025-03-13 RX ADMIN — HEPARIN SODIUM 5000 UNITS: 5000 INJECTION INTRAVENOUS; SUBCUTANEOUS at 12:45

## 2025-03-13 RX ADMIN — SENNOSIDES AND DOCUSATE SODIUM 2 TABLET: 50; 8.6 TABLET ORAL at 07:56

## 2025-03-13 RX ADMIN — TAMSULOSIN HYDROCHLORIDE 0.4 MG: 0.4 CAPSULE ORAL at 17:12

## 2025-03-13 RX ADMIN — MIDODRINE HYDROCHLORIDE 10 MG: 5 TABLET ORAL at 12:45

## 2025-03-13 RX ADMIN — MIDODRINE HYDROCHLORIDE 10 MG: 5 TABLET ORAL at 17:12

## 2025-03-13 RX ADMIN — LAMOTRIGINE 200 MG: 100 TABLET ORAL at 07:56

## 2025-03-13 RX ADMIN — HEPARIN SODIUM 5000 UNITS: 5000 INJECTION INTRAVENOUS; SUBCUTANEOUS at 05:22

## 2025-03-13 RX ADMIN — METHIMAZOLE 5 MG: 5 TABLET ORAL at 07:56

## 2025-03-13 RX ADMIN — ZINC SULFATE 220 MG (50 MG) CAPSULE 220 MG: CAPSULE at 07:56

## 2025-03-13 RX ADMIN — CEFTRIAXONE 1000 MG: 1 INJECTION, SOLUTION INTRAVENOUS at 19:46

## 2025-03-13 RX ADMIN — Medication 6 MG: at 21:04

## 2025-03-13 RX ADMIN — FERROUS SULFATE TAB 325 MG (65 MG ELEMENTAL FE) 325 MG: 325 (65 FE) TAB at 07:55

## 2025-03-13 RX ADMIN — ASPIRIN 81 MG CHEWABLE TABLET 81 MG: 81 TABLET CHEWABLE at 07:56

## 2025-03-13 RX ADMIN — CHLORHEXIDINE GLUCONATE 15 ML: 1.2 RINSE ORAL at 07:56

## 2025-03-13 RX ADMIN — LEVETIRACETAM 500 MG: 500 INJECTION, SOLUTION INTRAVENOUS at 21:07

## 2025-03-13 RX ADMIN — PANTOPRAZOLE SODIUM 20 MG: 20 TABLET, DELAYED RELEASE ORAL at 05:22

## 2025-03-13 NOTE — PROGRESS NOTES
Progress Note - Hospitalist   Name: Trevor Waldrop 60 y.o. male I MRN: [id number]  Unit/Bed#: -01 I Date of Admission: 3/5/2025   Date of Service: 3/13/2025 I Hospital Day: 8     Assessment & Plan  Septic shock (HCC)  Patient was admitted from nursing facility with fever and hypotension  Tested positive for influenza A and urine suggestive of urinary tract infection  Admission CT with left lower lobe consolidation  Met criteria for septic shock and required vasopressor support transiently.  Started on broad-spectrum antibiotic (vancomycin and cefepime) with Tamiflu for acute influenza  Blood cultures remain negative to date.  Urine culture with procidentia  MRSA swab negative.  Vancomycin discontinued  Antibiotic switched to ceftriaxone based on sensitivity result  Blood pressure has remained stable with MAP greater than 65  Fevers fortunately resolve  Cap IVF and observe d/t development of hyponatremia  GERD with esophagitis  -cont PPI  Bipolar disorder (HCC)  Continue Lamictal  Localization-related (focal) (partial) symptomatic epilepsy and epileptic syndromes with complex partial seizures, not intractable, with status epilepticus (HCC)  -cont Keppra  Influenza A  -cont Tamiflu  Left lower lobe pneumonia  -CT chest: Left lower lobe airspace consolidation and bilateral multi lobar tree-in-bud infiltrates may represent pneumonia and/or aspiration. Bilateral lower lobe segmental and subsegmental endobronchial opacification, left greater than right may represent mucous plugging and/or aspiration. Stable right upper lobe cystic lesion with mural nodularity and left upper lobe spiculated lesion unchanged since November 2020. Noncontrast chest CT follow-up in 12 months is advised.   -cont Rocephin  Acute cystitis with hematuria  -Required Anderson catheter placement for chronic bladder outlet obstruction and bilateral severe hydronephrosis.    -Urine culture with procidentia.    -cont Rocphein  JOHNY (acute kidney injury)  (HCC)  -POA with a creatinine of 6.36.  Prior creatinine in 2022 were 1.1-1.4.  Likely in the setting of septic shock with ATN.  Anderson catheter was placed in the emergency room.  -CT A/P on admission showing severe bilateral hydroureteronephrosis with distended bladder  -Anderson catheter was subsequently placed  -Repeat renal ultrasound shows mild to moderate dilatation of left pelvic calyceal system with severe hydronephrosis of the right kidney  -Patient seen by urology, continue Anderson catheter.  Consideration for suprapubic tube in the future  Completed IVF  Severe protein-calorie malnutrition (HCC)  Malnutrition Findings:   Adult Malnutrition type: Chronic illness  Adult Degree of Malnutrition: Other severe protein calorie malnutrition                     360 Statement: Pt presents with severe protein calorie malnutrition as evidenced by sunken orbitals, b/l temporal depressions and visble clavicle bone. Treat with diet and supplements BID.    BMI Findings:           Body mass index is 23.89 kg/m².   Encourage oral intake.  Nutrition input appreciated  Electrolyte abnormality  Multiple electrolyte abnormalities including hypokalemia hypomagnesemia and hypophosphatemia.    -2g IV Mg today  Other hydronephrosis  Repeat renal ultrasound results noted.    Urology consulted.    Maintain Anderson catheter for now.  Seizure disorder (HCC)  -cont Keppra  Anemia  Transfused 1 u PRBC  No evidence of acute loss  Maintain > 7  Hypertension     VTE  Prophylaxis:   Pharmacologic: in place  Mechanical VTE Prophylaxis in Place: Yes    Patient Centered Rounds: I have performed bedside rounds with nursing staff today.    Discussions with Specialists or Other Care Team Provider: case management    Education and Discussions with Family / Patient: yes    Mobility:   Basic Mobility Inpatient Raw Score: 6  -[facility] Goal: 2: Bed activities/Dependent transfer  -[facility] Achieved: 2: Bed activities/Dependent transfer      Current Length of Stay: 8  day(s)    Current Patient Status: Inpatient        Code Status: Level 1 - Full Code    Discharge Plan: Pt will require continued inpatient hospitalization.    Subjective:     Pt no longer febrile     Patient is seen and examined at bedside.  All other ROS are negative.    Objective:     Vitals:   Temp (24hrs), Av.5 °F (36.9 °C), Min:98.1 °F (36.7 °C), Max:98.9 °F (37.2 °C)    Temp:  [98.1 °F (36.7 °C)-98.9 °F (37.2 °C)] 98.9 °F (37.2 °C)  HR:  [] 87  Resp:  [16-20] 16  BP: ()/(56-76) 95/56  SpO2:  [93 %-98 %] 96 %  Body mass index is 23.89 kg/m².     Input and Output Summary (last 24 hours):       Intake/Output Summary (Last 24 hours) at 3/13/2025 1429  Last data filed at 3/13/2025 0950  Gross per 24 hour   Intake 1203.33 ml   Output 3175 ml   Net -1971.67 ml       Physical Exam:       GEN: No acute distress, comfortable  HEEENT: No JVD, PERRLA, no scleral icterus  RESP: Lungs clear to auscultation bilaterally  CV: RRR, +s1/s2   ABD: SOFT NON TENDER, POSITIVE BOWEL SOUNDS, NO DISTENTION  PSYCH: CALM, ill appearing   NEURO: [place] baseline, NO FOCAL DEFICITS  SKIN: NO RASH  EXTREM: NO EDEMA    Additional Data:     Labs:    Results from last 7 days   Lab Units 25  0557   WBC Thousand/uL 6.66   HEMOGLOBIN g/dL 9.3*   HEMATOCRIT % 28.4*   PLATELETS Thousands/uL 243   SEGS PCT % 76*   LYMPHO PCT % 15   MONO PCT % 6   EOS PCT % 1     Results from last 7 days   Lab Units 25  0557   SODIUM mmol/L 130*   POTASSIUM mmol/L 4.3   CHLORIDE mmol/L 97   CO2 mmol/L 26   BUN mg/dL 15   CREATININE mg/dL 1.13   ANION GAP mmol/L 7   CALCIUM mg/dL 8.4   ALBUMIN g/dL 2.6*   TOTAL BILIRUBIN mg/dL 0.34   ALK PHOS U/L 63   ALT U/L 18   AST U/L 23   GLUCOSE RANDOM mg/dL 92         Results from last 7 days   Lab Units 25  1117 25  0627 25  0003 25  1604 25  1142 25  0602 25  2341 25  1807 25  1245 25  0602 25  0051 03/10/25  1850   POC GLUCOSE  mg/dl 122 93 103 113 120 100 95 134 133 87 112 173*               Lines/Drains:  Invasive Devices       Peripheral Intravenous Line  Duration             Peripheral IV 03/12/25 Distal;Left;Upper;Ventral (anterior) Antecubital 1 day              Drain  Duration             Urethral Catheter Temperature probe 16 Fr. 8 days                    Telemetry:        * I Have Reviewed All Lab Data Listed Above.           Imaging:     Results for orders placed during the hospital encounter of 03/05/25    XR chest portable    Narrative  XR CHEST PORTABLE    INDICATION: fever.    COMPARISON: 11/13/2020 and CT scan from 11/14/2020    FINDINGS:    There is infiltrate in the left and right lower lung fields suggestive of pneumonia. The cystic lesion with internal nodule which was noted on the prior CT is not visible on plain film. The small spiculated nodule in the left lung which was mentioned on  the prior CT is also not readily apparent on plain film. No pneumothorax or pleural effusion.    Normal cardiomediastinal silhouette.    Bones are unremarkable for age.    Normal upper abdomen.    Impression  Bilateral infiltrates left greater than right suspicious for pneumonia.    The lesions seen on the prior CT are not visible on x-ray. Please refer to the report of the follow-up CT scan obtained after this x-ray for further details        Workstation performed: [id number]    No results found for this or any previous visit.      *I have reviewed all imaging reports listed above      Recent Cultures (last 7 days):           Last 24 Hours Medication List:   Current Facility-Administered Medications   Medication Dose Route Frequency Provider Last Rate    acetaminophen  650 mg Oral Q4H PRN NANI Roche      aspirin  81 mg Oral Daily NANI Roche      calcium carbonate  1,000 mg Oral BID PRN NANI Roche      cefTRIAXone  1,000 mg Intravenous Q24H Linda Koroma MD 1,000 mg (03/12/25 2020)    chlorhexidine   15 mL Mouth/Throat Q12H [facility] NANI Roche      cholecalciferol  2,000 Units Oral Daily NANI Roche      ferrous sulfate  325 mg Oral Daily With Breakfast NANI Roche      heparin (porcine)  5,000 Units Subcutaneous Q8H [facility] NANI Roche      ipratropium  0.5 mg Nebulization Q6H PRN Max 3/day Irene Powers MD      lamoTRIgine  200 mg Oral BID NANI Roche      levalbuterol  1.25 mg Nebulization Q6H PRN Irene Powers MD      levETIRAcetam  500 mg Intravenous Q12H [facility] Linda Koroma MD      melatonin  6 mg Oral HS NANI Roche      methimazole  5 mg Oral Daily Urbano Naidu MD      midodrine  10 mg Oral TID AC NANI Roche      mirtazapine  7.5 mg Oral HS NANI Roche      multivitamin stress formula  1 tablet Oral Daily NANI Roche      pantoprazole  20 mg Oral Early Morning NANI Roche      potassium-sodium phosphates  1 packet Oral Once Linda Koroma MD      senna-docusate sodium  2 tablet Oral Daily NANI Roche      tamsulosin  0.4 mg Oral Daily With Dinner NANI Roche      thiamine  100 mg Oral Daily NANI Roche      zinc sulfate  220 mg Oral Daily NANI Roche          Today, Patient Was Seen By: Elise Church MD    ** Please Note: Dictation voice to text software may have been used in the creation of this document. **

## 2025-03-13 NOTE — PLAN OF CARE
Problem: Potential for Falls  Goal: Patient will remain free of falls  Description: INTERVENTIONS:  - Educate patient/family on patient safety including physical limitations  - Instruct patient to call for assistance with activity   - Consult OT/PT to assist with strengthening/mobility   - Keep Call bell within reach  - Keep bed low and locked with side rails adjusted as appropriate  - Keep care items and personal belongings within reach  - Initiate and maintain comfort rounds  - Make Fall Risk Sign visible to staff  - Offer Toileting every 2 Hours, in advance of need  - Initiate/Maintain bed alarm  - Obtain necessary fall risk management equipment  - Apply yellow socks and bracelet for high fall risk patients  - Consider moving patient to room near nurses station  Outcome: Progressing     Problem: Prexisting or High Potential for Compromised Skin Integrity  Goal: Skin integrity is maintained or improved  Description: INTERVENTIONS:  - Identify patients at risk for skin breakdown  - Assess and monitor skin integrity  - Assess and monitor nutrition and hydration status  - Monitor labs   - Assess for incontinence   - Turn and reposition patient  - Assist with mobility/ambulation  - Relieve pressure over bony prominences  - Avoid friction and shearing  - Provide appropriate hygiene as needed including keeping skin clean and dry  - Evaluate need for skin moisturizer/barrier cream  - Collaborate with interdisciplinary team   - Patient/family teaching  - Consider wound care consult   Outcome: Progressing     Problem: PAIN - ADULT  Goal: Verbalizes/displays adequate comfort level or baseline comfort level  Description: Interventions:  - Encourage patient to monitor pain and request assistance  - Assess pain using appropriate pain scale  - Administer analgesics based on type and severity of pain and evaluate response  - Implement non-pharmacological measures as appropriate and evaluate response  - Consider cultural and  social influences on pain and pain management  - Notify physician/advanced practitioner if interventions unsuccessful or patient reports new pain  Outcome: Progressing     Problem: INFECTION - ADULT  Goal: Absence or prevention of progression during hospitalization  Description: INTERVENTIONS:  - Assess and monitor for signs and symptoms of infection  - Monitor lab/diagnostic results  - Monitor all insertion sites, i.e. indwelling lines, tubes, and drains  - Monitor endotracheal if appropriate and nasal secretions for changes in amount and color  - [place] appropriate cooling/warming therapies per order  - Administer medications as ordered  - Instruct and encourage patient and family to use good hand hygiene technique  - Identify and instruct in appropriate isolation precautions for identified infection/condition  Outcome: Progressing  Goal: Absence of fever/infection during neutropenic period  Description: INTERVENTIONS:  - Monitor WBC    Outcome: Progressing     Problem: SAFETY ADULT  Goal: Patient will remain free of falls  Description: INTERVENTIONS:  - Educate patient/family on patient safety including physical limitations  - Instruct patient to call for assistance with activity   - Consult OT/PT to assist with strengthening/mobility   - Keep Call bell within reach  - Keep bed low and locked with side rails adjusted as appropriate  - Keep care items and personal belongings within reach  - Initiate and maintain comfort rounds  - Make Fall Risk Sign visible to staff  - Offer Toileting every 2 Hours, in advance of need  - Initiate/Maintain bed alarm  - Obtain necessary fall risk management equipment  - Apply yellow socks and bracelet for high fall risk patients  - Consider moving patient to room near nurses station  Outcome: Progressing  Goal: Maintain or return to baseline ADL function  Description: INTERVENTIONS:  -  Assess patient's ability to carry out ADLs; assess patient's baseline for ADL function and  identify physical deficits which impact ability to perform ADLs (bathing, care of mouth/teeth, toileting, grooming, dressing, etc.)  - Assess/evaluate cause of self-care deficits   - Assess range of motion  - Assess patient's mobility; develop plan if impaired  - Assess patient's need for assistive devices and provide as appropriate  - Encourage maximum independence but intervene and supervise when necessary  - Involve family in performance of ADLs  - Assess for home care needs following discharge   - Consider OT consult to assist with ADL evaluation and planning for discharge  - Provide patient education as appropriate  Outcome: Progressing  Goal: Maintains/Returns to pre admission functional level  Description: INTERVENTIONS:  - Perform AM-PAC 6 Click Basic Mobility/ Daily Activity assessment daily.  - Set and communicate daily mobility goal to care team and patient/family/caregiver.   - Collaborate with rehabilitation services on mobility goals if consulted  - Perform Range of Motion 4 times a day.  - Reposition patient every 2 hours.  - Dangle patient 3 times a day  - Stand patient 3 times a day  - Ambulate patient 3 times a day  - Out of bed to chair 3 times a day   - Out of bed for meals 3 times a day  - Out of bed for toileting  - Record patient progress and toleration of activity level   Outcome: Progressing     Problem: DISCHARGE PLANNING  Goal: Discharge to home or other facility with appropriate resources  Description: INTERVENTIONS:  - Identify barriers to discharge w/patient and caregiver  - Arrange for needed discharge resources and transportation as appropriate  - Identify discharge learning needs (meds, wound care, etc.)  - Arrange for interpretive services to assist at discharge as needed  - Refer to Case Management Department for coordinating discharge planning if the patient needs post-hospital services based on physician/advanced practitioner order or complex needs related to functional status,  cognitive ability, or social support system  Outcome: Progressing     Problem: Knowledge Deficit  Goal: Patient/family/caregiver demonstrates understanding of disease process, treatment plan, medications, and discharge instructions  Description: Complete learning assessment and assess knowledge base.  Interventions:  - Provide teaching at level of understanding  - Provide teaching via preferred learning methods  Outcome: Progressing     Problem: Nutrition/Hydration-ADULT  Goal: Nutrient/Hydration intake appropriate for improving, restoring or maintaining nutritional needs  Description: Monitor and assess patient's nutrition/hydration status for malnutrition. Collaborate with interdisciplinary team and initiate plan and interventions as ordered.  Monitor patient's weight and dietary intake as ordered or per policy. Utilize nutrition screening tool and intervene as necessary. Determine patient's food preferences and provide high-protein, high-caloric foods as appropriate.     INTERVENTIONS:  - Monitor oral intake, urinary output, labs, and treatment plans  - Assess nutrition and hydration status and recommend course of action  - Evaluate amount of meals eaten  - Assist patient with eating if necessary   - Allow adequate time for meals  - Recommend/ encourage appropriate diets, oral nutritional supplements, and vitamin/mineral supplements  - Order, calculate, and assess calorie counts as needed  - Recommend, monitor, and adjust tube feedings and TPN/PPN based on assessed needs  - Assess need for intravenous fluids  - Provide specific nutrition/hydration education as appropriate  - Include patient/family/caregiver in decisions related to nutrition  Outcome: Progressing

## 2025-03-13 NOTE — PLAN OF CARE
Problem: Potential for Falls  Goal: Patient will remain free of falls  Description: INTERVENTIONS:  - Educate patient/family on patient safety including physical limitations  - Instruct patient to call for assistance with activity   - Consult OT/PT to assist with strengthening/mobility   - Keep Call bell within reach  - Keep bed low and locked with side rails adjusted as appropriate  - Keep care items and personal belongings within reach  - Initiate and maintain comfort rounds  - Make Fall Risk Sign visible to staff  - Offer Toileting every x Hours, in advance of need  - Initiate/Maintain xalarm  - Obtain necessary fall risk management equipment: x  - Apply yellow socks and bracelet for high fall risk patients  - Consider moving patient to room near nurses station  Outcome: Progressing     Problem: Prexisting or High Potential for Compromised Skin Integrity  Goal: Skin integrity is maintained or improved  Description: INTERVENTIONS:  - Identify patients at risk for skin breakdown  - Assess and monitor skin integrity  - Assess and monitor nutrition and hydration status  - Monitor labs   - Assess for incontinence   - Turn and reposition patient  - Assist with mobility/ambulation  - Relieve pressure over bony prominences  - Avoid friction and shearing  - Provide appropriate hygiene as needed including keeping skin clean and dry  - Evaluate need for skin moisturizer/barrier cream  - Collaborate with interdisciplinary team   - Patient/family teaching  - Consider wound care consult   Outcome: Progressing     Problem: PAIN - ADULT  Goal: Verbalizes/displays adequate comfort level or baseline comfort level  Description: Interventions:  - Encourage patient to monitor pain and request assistance  - Assess pain using appropriate pain scale  - Administer analgesics based on type and severity of pain and evaluate response  - Implement non-pharmacological measures as appropriate and evaluate response  - Consider cultural and  social influences on pain and pain management  - Notify physician/advanced practitioner if interventions unsuccessful or patient reports new pain  Outcome: Progressing     Problem: INFECTION - ADULT  Goal: Absence or prevention of progression during hospitalization  Description: INTERVENTIONS:  - Assess and monitor for signs and symptoms of infection  - Monitor lab/diagnostic results  - Monitor all insertion sites, i.e. indwelling lines, tubes, and drains  - Monitor endotracheal if appropriate and nasal secretions for changes in amount and color  - [place] appropriate cooling/warming therapies per order  - Administer medications as ordered  - Instruct and encourage patient and family to use good hand hygiene technique  - Identify and instruct in appropriate isolation precautions for identified infection/condition  Outcome: Progressing  Goal: Absence of fever/infection during neutropenic period  Description: INTERVENTIONS:  - Monitor WBC    Outcome: Progressing     Problem: SAFETY ADULT  Goal: Patient will remain free of falls  Description: INTERVENTIONS:  - Educate patient/family on patient safety including physical limitations  - Instruct patient to call for assistance with activity   - Consult OT/PT to assist with strengthening/mobility   - Keep Call bell within reach  - Keep bed low and locked with side rails adjusted as appropriate  - Keep care items and personal belongings within reach  - Initiate and maintain comfort rounds  - Make Fall Risk Sign visible to staff  - Offer Toileting every x Hours, in advance of need  - Initiate/Maintain xalarm  - Obtain necessary fall risk management equipment: x  - Apply yellow socks and bracelet for high fall risk patients  - Consider moving patient to room near nurses station  Outcome: Progressing  Goal: Maintain or return to baseline ADL function  Description: INTERVENTIONS:  -  Assess patient's ability to carry out ADLs; assess patient's baseline for ADL function and  identify physical deficits which impact ability to perform ADLs (bathing, care of mouth/teeth, toileting, grooming, dressing, etc.)  - Assess/evaluate cause of self-care deficits   - Assess range of motion  - Assess patient's mobility; develop plan if impaired  - Assess patient's need for assistive devices and provide as appropriate  - Encourage maximum independence but intervene and supervise when necessary  - Involve family in performance of ADLs  - Assess for home care needs following discharge   - Consider OT consult to assist with ADL evaluation and planning for discharge  - Provide patient education as appropriate  Outcome: Progressing  Goal: Maintains/Returns to pre admission functional level  Description: INTERVENTIONS:  - Perform AM-PAC 6 Click Basic Mobility/ Daily Activity assessment daily.  - Set and communicate daily mobility goal to care team and patient/family/caregiver.   - Collaborate with rehabilitation services on mobility goals if consulted  - Perform Range of Motion x times a day.  - Reposition patient every x hours.  - Dangle patient x times a day  - Stand patient x times a day  - Ambulate patient x times a day  - Out of bed to chair x times a day   - Out of bed for meals xxx times a day  - Out of bed for toileting  - Record patient progress and toleration of activity level   Outcome: Progressing     Problem: DISCHARGE PLANNING  Goal: Discharge to home or other facility with appropriate resources  Description: INTERVENTIONS:  - Identify barriers to discharge w/patient and caregiver  - Arrange for needed discharge resources and transportation as appropriate  - Identify discharge learning needs (meds, wound care, etc.)  - Arrange for interpretive services to assist at discharge as needed  - Refer to Case Management Department for coordinating discharge planning if the patient needs post-hospital services based on physician/advanced practitioner order or complex needs related to functional status,  cognitive ability, or social support system  Outcome: Progressing     Problem: Knowledge Deficit  Goal: Patient/family/caregiver demonstrates understanding of disease process, treatment plan, medications, and discharge instructions  Description: Complete learning assessment and assess knowledge base.  Interventions:  - Provide teaching at level of understanding  - Provide teaching via preferred learning methods  Outcome: Progressing     Problem: Nutrition/Hydration-ADULT  Goal: Nutrient/Hydration intake appropriate for improving, restoring or maintaining nutritional needs  Description: Monitor and assess patient's nutrition/hydration status for malnutrition. Collaborate with interdisciplinary team and initiate plan and interventions as ordered.  Monitor patient's weight and dietary intake as ordered or per policy. Utilize nutrition screening tool and intervene as necessary. Determine patient's food preferences and provide high-protein, high-caloric foods as appropriate.     INTERVENTIONS:  - Monitor oral intake, urinary output, labs, and treatment plans  - Assess nutrition and hydration status and recommend course of action  - Evaluate amount of meals eaten  - Assist patient with eating if necessary   - Allow adequate time for meals  - Recommend/ encourage appropriate diets, oral nutritional supplements, and vitamin/mineral supplements  - Order, calculate, and assess calorie counts as needed  - Recommend, monitor, and adjust tube feedings and TPN/PPN based on assessed needs  - Assess need for intravenous fluids  - Provide specific nutrition/hydration education as appropriate  - Include patient/family/caregiver in decisions related to nutrition  Outcome: Progressing

## 2025-03-13 NOTE — CASE MANAGEMENT
Case Management Discharge Planning Note    Patient name Trevor Waldrop  Location /-01 MRN [id number]  : 1965 Date 3/13/2025       Current Admission Date: 3/5/2025  Current Admission Diagnosis:Septic shock (HCC)   Patient Active Problem List    Diagnosis Date Noted Date Diagnosed    Seizure disorder (HCC) 2025     Electrolyte abnormality 2025     Other hydronephrosis 2025     Severe protein-calorie malnutrition (HCC) 2025     Influenza A 2025     Left lower lobe pneumonia 2025     Acute cystitis with hematuria 2025     JOHNY (acute kidney injury) (HCC) 2025     Urinary retention 2022     Septic shock (HCC) 2020     Hyponatremia 2020     Hypertension 2020     Bipolar disorder (HCC) 10/01/2019     Traumatic amputation of legs, 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     Depression 10/01/2019     Localization-related (focal) (partial) symptomatic epilepsy and epileptic syndromes with complex partial seizures, not intractable, with status epilepticus (HCC) 10/01/2019     GERD with esophagitis 2019     Anemia 2019     Chronic hepatitis C without hepatic coma (HCC) 2019     GERD (gastroesophageal reflux disease) 2017     Hypokalemia 2016       LOS (days): 8  Geometric Mean LOS (GMLOS) (days): 4.9  Days to GMLOS:-3.3     OBJECTIVE:  Risk of Unplanned Readmission Score: 18.19         Current admission status: Inpatient   Preferred Pharmacy:   [facility] [place], [place] - [address] [address]  Phone: [phone] Fax: [phone]    Primary Care Provider: Manoj Perez DO    Primary Insurance: [facility]  Secondary Insurance:     DISCHARGE DETAILS:     Per MD, pt will be medically stable to return to [facility] tomorrow. CM updated facility via Aidin. CM requested BLS transport via [employer]. Awaiting confirmed  time.      Accepting Facility Name, City & State : [facility]  Receiving Facility/Agency Phone Number: [phone]  Facility/Agency Fax Number: [phone]

## 2025-03-13 NOTE — ASSESSMENT & PLAN NOTE
-POA with a creatinine of 6.36.  Prior creatinine in 2022 were 1.1-1.4.  Likely in the setting of septic shock with ATN.  Anderson catheter was placed in the emergency room.  -CT A/P on admission showing severe bilateral hydroureteronephrosis with distended bladder  -Anderson catheter was subsequently placed  -Repeat renal ultrasound shows mild to moderate dilatation of left pelvic calyceal system with severe hydronephrosis of the right kidney  -Patient seen by urology, continue Anderson catheter.  Consideration for suprapubic tube in the future  Completed IVF

## 2025-03-13 NOTE — CASE MANAGEMENT
Case Management Discharge Planning Note    Patient name Trevor Waldrop  Location /-01 MRN [id number]  : 1965 Date 3/13/2025       Current Admission Date: 3/5/2025  Current Admission Diagnosis:Septic shock (HCC)   Patient Active Problem List    Diagnosis Date Noted Date Diagnosed    Seizure disorder (HCC) 2025     Electrolyte abnormality 2025     Other hydronephrosis 2025     Severe protein-calorie malnutrition (HCC) 2025     Influenza A 2025     Left lower lobe pneumonia 2025     Acute cystitis with hematuria 2025     JOHNY (acute kidney injury) (HCC) 2025     Urinary retention 2022     Septic shock (HCC) 2020     Hyponatremia 2020     Hypertension 2020     Bipolar disorder (HCC) 10/01/2019     Traumatic amputation of legs, 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     Depression 10/01/2019     Localization-related (focal) (partial) symptomatic epilepsy and epileptic syndromes with complex partial seizures, not intractable, with status epilepticus (HCC) 10/01/2019     GERD with esophagitis 2019     Anemia 2019     Chronic hepatitis C without hepatic coma (HCC) 2019     GERD (gastroesophageal reflux disease) 2017     Hypokalemia 2016       LOS (days): 8  Geometric Mean LOS (GMLOS) (days): 4.9  Days to GMLOS:-3.3     OBJECTIVE:  Risk of Unplanned Readmission Score: 18.19         Current admission status: Inpatient   Preferred Pharmacy:   [facility] [place], [place] - [address] [address]  Phone: [phone] Fax: [phone]    Primary Care Provider: Manoj Perez DO    Primary Insurance: [facility]  Secondary Insurance:     DISCHARGE DETAILS:     CM received 2p  time for pt to return to [facility] tomorrow. CM updated MD and RN via secure chat, facility via Aidin, and pt in person.        Transport at Discharge : [facility]        Transported by (Company and Unit #): [employer]  ETA of Transport (Date): 03/14/25  ETA of Transport (Time): 1400

## 2025-03-13 NOTE — ASSESSMENT & PLAN NOTE
Patient was admitted from nursing facility with fever and hypotension  Tested positive for influenza A and urine suggestive of urinary tract infection  Admission CT with left lower lobe consolidation  Met criteria for septic shock and required vasopressor support transiently.  Started on broad-spectrum antibiotic (vancomycin and cefepime) with Tamiflu for acute influenza  Blood cultures remain negative to date.  Urine culture with procidentia  MRSA swab negative.  Vancomycin discontinued  Antibiotic switched to ceftriaxone based on sensitivity result  Blood pressure has remained stable with MAP greater than 65  Fevers fortunately resolve  Cap IVF and observe d/t development of hyponatremia

## 2025-03-14 VITALS
SYSTOLIC BLOOD PRESSURE: 107 MMHG | OXYGEN SATURATION: 94 % | DIASTOLIC BLOOD PRESSURE: 67 MMHG | TEMPERATURE: 97.9 F | HEIGHT: 60 IN | RESPIRATION RATE: 14 BRPM | WEIGHT: 95.46 LBS | BODY MASS INDEX: 18.74 KG/M2 | HEART RATE: 91 BPM

## 2025-03-14 LAB
ALBUMIN SERPL BCG-MCNC: 2.8 G/DL (ref 3.5–5)
ALP SERPL-CCNC: 75 U/L (ref 34–104)
ALT SERPL W P-5'-P-CCNC: 24 U/L (ref 7–52)
ANION GAP SERPL CALCULATED.3IONS-SCNC: 6 MMOL/L (ref 4–13)
AST SERPL W P-5'-P-CCNC: 32 U/L (ref 13–39)
BASOPHILS # BLD AUTO: 0.01 THOUSANDS/ÂΜL (ref 0–0.1)
BASOPHILS NFR BLD AUTO: 0 % (ref 0–1)
BILIRUB SERPL-MCNC: 0.26 MG/DL (ref 0.2–1)
BUN SERPL-MCNC: 19 MG/DL (ref 5–25)
CALCIUM ALBUM COR SERPL-MCNC: 10 MG/DL (ref 8.3–10.1)
CALCIUM SERPL-MCNC: 9 MG/DL (ref 8.4–10.2)
CHLORIDE SERPL-SCNC: 94 MMOL/L (ref 96–108)
CO2 SERPL-SCNC: 30 MMOL/L (ref 21–32)
CREAT SERPL-MCNC: 1.14 MG/DL (ref 0.6–1.3)
EOSINOPHIL # BLD AUTO: 0.06 THOUSAND/ÂΜL (ref 0–0.61)
EOSINOPHIL NFR BLD AUTO: 1 % (ref 0–6)
ERYTHROCYTE [DISTWIDTH] IN BLOOD BY AUTOMATED COUNT: 13.9 % (ref 11.6–15.1)
GFR SERPL CREATININE-BSD FRML MDRD: 69 ML/MIN/1.73SQ M
GLUCOSE SERPL-MCNC: 106 MG/DL (ref 65–140)
GLUCOSE SERPL-MCNC: 88 MG/DL (ref 65–140)
GLUCOSE SERPL-MCNC: 96 MG/DL (ref 65–140)
GLUCOSE SERPL-MCNC: 98 MG/DL (ref 65–140)
HCT VFR BLD AUTO: 26 % (ref 36.5–49.3)
HGB BLD-MCNC: 8.5 G/DL (ref 12–17)
IMM GRANULOCYTES # BLD AUTO: 0.08 THOUSAND/UL (ref 0–0.2)
IMM GRANULOCYTES NFR BLD AUTO: 1 % (ref 0–2)
LYMPHOCYTES # BLD AUTO: 1.25 THOUSANDS/ÂΜL (ref 0.6–4.47)
LYMPHOCYTES NFR BLD AUTO: 22 % (ref 14–44)
MCH RBC QN AUTO: 31.6 PG (ref 26.8–34.3)
MCHC RBC AUTO-ENTMCNC: 32.7 G/DL (ref 31.4–37.4)
MCV RBC AUTO: 97 FL (ref 82–98)
MONOCYTES # BLD AUTO: 0.55 THOUSAND/ÂΜL (ref 0.17–1.22)
MONOCYTES NFR BLD AUTO: 10 % (ref 4–12)
NEUTROPHILS # BLD AUTO: 3.87 THOUSANDS/ÂΜL (ref 1.85–7.62)
NEUTS SEG NFR BLD AUTO: 66 % (ref 43–75)
NRBC BLD AUTO-RTO: 0 /100 WBCS
PLATELET # BLD AUTO: 275 THOUSANDS/UL (ref 149–390)
PMV BLD AUTO: 11.3 FL (ref 8.9–12.7)
POTASSIUM SERPL-SCNC: 4.8 MMOL/L (ref 3.5–5.3)
PROT SERPL-MCNC: 7.1 G/DL (ref 6.4–8.4)
RBC # BLD AUTO: 2.69 MILLION/UL (ref 3.88–5.62)
SODIUM SERPL-SCNC: 130 MMOL/L (ref 135–147)
WBC # BLD AUTO: 5.82 THOUSAND/UL (ref 4.31–10.16)

## 2025-03-14 PROCEDURE — 82948 REAGENT STRIP/BLOOD GLUCOSE: CPT

## 2025-03-14 PROCEDURE — 85025 COMPLETE CBC W/AUTO DIFF WBC: CPT | Performed by: HOSPITALIST

## 2025-03-14 PROCEDURE — 99239 HOSP IP/OBS DSCHRG MGMT >30: CPT | Performed by: HOSPITALIST

## 2025-03-14 PROCEDURE — 80053 COMPREHEN METABOLIC PANEL: CPT | Performed by: HOSPITALIST

## 2025-03-14 RX ORDER — SODIUM CHLORIDE 1 G/1
2 TABLET ORAL 2 TIMES DAILY
Start: 2025-03-14

## 2025-03-14 RX ORDER — TRAMADOL HYDROCHLORIDE 50 MG/1
50 TABLET ORAL EVERY 6 HOURS PRN
Status: COMPLETED | OUTPATIENT
Start: 2025-03-14 | End: 2025-03-14

## 2025-03-14 RX ORDER — MIRTAZAPINE 7.5 MG/1
7.5 TABLET, FILM COATED ORAL
Start: 2025-03-14

## 2025-03-14 RX ORDER — MIDODRINE HYDROCHLORIDE 10 MG/1
10 TABLET ORAL
Start: 2025-03-14

## 2025-03-14 RX ORDER — METHIMAZOLE 5 MG/1
5 TABLET ORAL DAILY
Qty: 30 TABLET | Refills: 0
Start: 2025-03-15

## 2025-03-14 RX ADMIN — MIDODRINE HYDROCHLORIDE 10 MG: 5 TABLET ORAL at 11:57

## 2025-03-14 RX ADMIN — ASPIRIN 81 MG CHEWABLE TABLET 81 MG: 81 TABLET CHEWABLE at 08:41

## 2025-03-14 RX ADMIN — ACETAMINOPHEN 650 MG: 325 TABLET, FILM COATED ORAL at 08:50

## 2025-03-14 RX ADMIN — Medication 2000 UNITS: at 08:41

## 2025-03-14 RX ADMIN — PANTOPRAZOLE SODIUM 20 MG: 20 TABLET, DELAYED RELEASE ORAL at 05:52

## 2025-03-14 RX ADMIN — TRAMADOL HYDROCHLORIDE 50 MG: 50 TABLET, COATED ORAL at 12:39

## 2025-03-14 RX ADMIN — ACETAMINOPHEN 650 MG: 325 TABLET, FILM COATED ORAL at 01:49

## 2025-03-14 RX ADMIN — CHLORHEXIDINE GLUCONATE 15 ML: 1.2 RINSE ORAL at 08:41

## 2025-03-14 RX ADMIN — METHIMAZOLE 5 MG: 5 TABLET ORAL at 08:41

## 2025-03-14 RX ADMIN — ZINC SULFATE 220 MG (50 MG) CAPSULE 220 MG: CAPSULE at 08:41

## 2025-03-14 RX ADMIN — FERROUS SULFATE TAB 325 MG (65 MG ELEMENTAL FE) 325 MG: 325 (65 FE) TAB at 08:42

## 2025-03-14 RX ADMIN — LEVETIRACETAM 500 MG: 500 INJECTION, SOLUTION INTRAVENOUS at 08:41

## 2025-03-14 RX ADMIN — B-COMPLEX W/ C & FOLIC ACID TAB 1 TABLET: TAB at 08:41

## 2025-03-14 RX ADMIN — Medication 100 MG: at 08:41

## 2025-03-14 RX ADMIN — LAMOTRIGINE 200 MG: 100 TABLET ORAL at 08:41

## 2025-03-14 RX ADMIN — HEPARIN SODIUM 5000 UNITS: 5000 INJECTION INTRAVENOUS; SUBCUTANEOUS at 05:49

## 2025-03-14 RX ADMIN — SENNOSIDES AND DOCUSATE SODIUM 2 TABLET: 50; 8.6 TABLET ORAL at 08:41

## 2025-03-14 RX ADMIN — MIDODRINE HYDROCHLORIDE 10 MG: 5 TABLET ORAL at 05:51

## 2025-03-14 NOTE — PLAN OF CARE
Problem: Potential for Falls  Goal: Patient will remain free of falls  Description: INTERVENTIONS:  - Educate patient/family on patient safety including physical limitations  - Instruct patient to call for assistance with activity   - Consult OT/PT to assist with strengthening/mobility   - Keep Call bell within reach  - Keep bed low and locked with side rails adjusted as appropriate  - Keep care items and personal belongings within reach  - Initiate and maintain comfort rounds  - Make Fall Risk Sign visible to staff  - Offer Toileting every 2 Hours, in advance of need  - Initiate/Maintain bed alarm  - Obtain necessary fall risk management equipment:   - Apply yellow socks and bracelet for high fall risk patients  - Consider moving patient to room near nurses station  Outcome: Progressing     Problem: Prexisting or High Potential for Compromised Skin Integrity  Goal: Skin integrity is maintained or improved  Description: INTERVENTIONS:  - Identify patients at risk for skin breakdown  - Assess and monitor skin integrity  - Assess and monitor nutrition and hydration status  - Monitor labs   - Assess for incontinence   - Turn and reposition patient  - Assist with mobility/ambulation  - Relieve pressure over bony prominences  - Avoid friction and shearing  - Provide appropriate hygiene as needed including keeping skin clean and dry  - Evaluate need for skin moisturizer/barrier cream  - Collaborate with interdisciplinary team   - Patient/family teaching  - Consider wound care consult   Outcome: Progressing     Problem: PAIN - ADULT  Goal: Verbalizes/displays adequate comfort level or baseline comfort level  Description: Interventions:  - Encourage patient to monitor pain and request assistance  - Assess pain using appropriate pain scale  - Administer analgesics based on type and severity of pain and evaluate response  - Implement non-pharmacological measures as appropriate and evaluate response  - Consider cultural and  social influences on pain and pain management  - Notify physician/advanced practitioner if interventions unsuccessful or patient reports new pain  Outcome: Progressing     Problem: INFECTION - ADULT  Goal: Absence or prevention of progression during hospitalization  Description: INTERVENTIONS:  - Assess and monitor for signs and symptoms of infection  - Monitor lab/diagnostic results  - Monitor all insertion sites, i.e. indwelling lines, tubes, and drains  - Monitor endotracheal if appropriate and nasal secretions for changes in amount and color  - [place] appropriate cooling/warming therapies per order  - Administer medications as ordered  - Instruct and encourage patient and family to use good hand hygiene technique  - Identify and instruct in appropriate isolation precautions for identified infection/condition  Outcome: Progressing  Goal: Absence of fever/infection during neutropenic period  Description: INTERVENTIONS:  - Monitor WBC    Outcome: Progressing     Problem: SAFETY ADULT  Goal: Patient will remain free of falls  Description: INTERVENTIONS:  - Educate patient/family on patient safety including physical limitations  - Instruct patient to call for assistance with activity   - Consult OT/PT to assist with strengthening/mobility   - Keep Call bell within reach  - Keep bed low and locked with side rails adjusted as appropriate  - Keep care items and personal belongings within reach  - Initiate and maintain comfort rounds  - Make Fall Risk Sign visible to staff  - Offer Toileting every 2 Hours, in advance of need  - Initiate/Maintain bed alarm  - Obtain necessary fall risk management equipment:   - Apply yellow socks and bracelet for high fall risk patients  - Consider moving patient to room near nurses station  Outcome: Progressing  Goal: Maintain or return to baseline ADL function  Description: INTERVENTIONS:  -  Assess patient's ability to carry out ADLs; assess patient's baseline for ADL function and  identify physical deficits which impact ability to perform ADLs (bathing, care of mouth/teeth, toileting, grooming, dressing, etc.)  - Assess/evaluate cause of self-care deficits   - Assess range of motion  - Assess patient's mobility; develop plan if impaired  - Assess patient's need for assistive devices and provide as appropriate  - Encourage maximum independence but intervene and supervise when necessary  - Involve family in performance of ADLs  - Assess for home care needs following discharge   - Consider OT consult to assist with ADL evaluation and planning for discharge  - Provide patient education as appropriate  Outcome: Progressing  Goal: Maintains/Returns to pre admission functional level  Description: INTERVENTIONS:  - Perform AM-PAC 6 Click Basic Mobility/ Daily Activity assessment daily.  - Set and communicate daily mobility goal to care team and patient/family/caregiver.   - Collaborate with rehabilitation services on mobility goals if consulted  - Perform Range of Motion 3 times a day.  - Reposition patient every 2 hours.  - Dangle patient 3 times a day  - Stand patient 3 times a day  - Ambulate patient 3 times a day  - Out of bed to chair 3 times a day   - Out of bed for meals 3 times a day  - Out of bed for toileting  - Record patient progress and toleration of activity level   Outcome: Progressing     Problem: DISCHARGE PLANNING  Goal: Discharge to home or other facility with appropriate resources  Description: INTERVENTIONS:  - Identify barriers to discharge w/patient and caregiver  - Arrange for needed discharge resources and transportation as appropriate  - Identify discharge learning needs (meds, wound care, etc.)  - Arrange for interpretive services to assist at discharge as needed  - Refer to Case Management Department for coordinating discharge planning if the patient needs post-hospital services based on physician/advanced practitioner order or complex needs related to functional status,  cognitive ability, or social support system  Outcome: Progressing     Problem: Knowledge Deficit  Goal: Patient/family/caregiver demonstrates understanding of disease process, treatment plan, medications, and discharge instructions  Description: Complete learning assessment and assess knowledge base.  Interventions:  - Provide teaching at level of understanding  - Provide teaching via preferred learning methods  Outcome: Progressing     Problem: Nutrition/Hydration-ADULT  Goal: Nutrient/Hydration intake appropriate for improving, restoring or maintaining nutritional needs  Description: Monitor and assess patient's nutrition/hydration status for malnutrition. Collaborate with interdisciplinary team and initiate plan and interventions as ordered.  Monitor patient's weight and dietary intake as ordered or per policy. Utilize nutrition screening tool and intervene as necessary. Determine patient's food preferences and provide high-protein, high-caloric foods as appropriate.     INTERVENTIONS:  - Monitor oral intake, urinary output, labs, and treatment plans  - Assess nutrition and hydration status and recommend course of action  - Evaluate amount of meals eaten  - Assist patient with eating if necessary   - Allow adequate time for meals  - Recommend/ encourage appropriate diets, oral nutritional supplements, and vitamin/mineral supplements  - Order, calculate, and assess calorie counts as needed  - Recommend, monitor, and adjust tube feedings and TPN/PPN based on assessed needs  - Assess need for intravenous fluids  - Provide specific nutrition/hydration education as appropriate  - Include patient/family/caregiver in decisions related to nutrition  Outcome: Progressing

## 2025-03-14 NOTE — PLAN OF CARE
Problem: Potential for Falls  Goal: Patient will remain free of falls  Description: INTERVENTIONS:  - Educate patient/family on patient safety including physical limitations  - Instruct patient to call for assistance with activity   - Consult OT/PT to assist with strengthening/mobility   - Keep Call bell within reach  - Keep bed low and locked with side rails adjusted as appropriate  - Keep care items and personal belongings within reach  - Initiate and maintain comfort rounds  - Make Fall Risk Sign visible to staff  - Offer Toileting every x Hours, in advance of need  - Initiate/Maintain xalarm  - Obtain necessary fall risk management equipment: x  - Apply yellow socks and bracelet for high fall risk patients  - Consider moving patient to room near nurses station  Outcome: Progressing     Problem: Prexisting or High Potential for Compromised Skin Integrity  Goal: Skin integrity is maintained or improved  Description: INTERVENTIONS:  - Identify patients at risk for skin breakdown  - Assess and monitor skin integrity  - Assess and monitor nutrition and hydration status  - Monitor labs   - Assess for incontinence   - Turn and reposition patient  - Assist with mobility/ambulation  - Relieve pressure over bony prominences  - Avoid friction and shearing  - Provide appropriate hygiene as needed including keeping skin clean and dry  - Evaluate need for skin moisturizer/barrier cream  - Collaborate with interdisciplinary team   - Patient/family teaching  - Consider wound care consult   Outcome: Progressing     Problem: PAIN - ADULT  Goal: Verbalizes/displays adequate comfort level or baseline comfort level  Description: Interventions:  - Encourage patient to monitor pain and request assistance  - Assess pain using appropriate pain scale  - Administer analgesics based on type and severity of pain and evaluate response  - Implement non-pharmacological measures as appropriate and evaluate response  - Consider cultural and  social influences on pain and pain management  - Notify physician/advanced practitioner if interventions unsuccessful or patient reports new pain  Outcome: Progressing     Problem: INFECTION - ADULT  Goal: Absence or prevention of progression during hospitalization  Description: INTERVENTIONS:  - Assess and monitor for signs and symptoms of infection  - Monitor lab/diagnostic results  - Monitor all insertion sites, i.e. indwelling lines, tubes, and drains  - Monitor endotracheal if appropriate and nasal secretions for changes in amount and color  - [place] appropriate cooling/warming therapies per order  - Administer medications as ordered  - Instruct and encourage patient and family to use good hand hygiene technique  - Identify and instruct in appropriate isolation precautions for identified infection/condition  Outcome: Progressing  Goal: Absence of fever/infection during neutropenic period  Description: INTERVENTIONS:  - Monitor WBC    Outcome: Progressing     Problem: SAFETY ADULT  Goal: Patient will remain free of falls  Description: INTERVENTIONS:  - Educate patient/family on patient safety including physical limitations  - Instruct patient to call for assistance with activity   - Consult OT/PT to assist with strengthening/mobility   - Keep Call bell within reach  - Keep bed low and locked with side rails adjusted as appropriate  - Keep care items and personal belongings within reach  - Initiate and maintain comfort rounds  - Make Fall Risk Sign visible to staff  - Offer Toileting every x Hours, in advance of need  - Initiate/Maintain xalarm  - Obtain necessary fall risk management equipment: x  - Apply yellow socks and bracelet for high fall risk patients  - Consider moving patient to room near nurses station  Outcome: Progressing  Goal: Maintain or return to baseline ADL function  Description: INTERVENTIONS:  -  Assess patient's ability to carry out ADLs; assess patient's baseline for ADL function and  identify physical deficits which impact ability to perform ADLs (bathing, care of mouth/teeth, toileting, grooming, dressing, etc.)  - Assess/evaluate cause of self-care deficits   - Assess range of motion  - Assess patient's mobility; develop plan if impaired  - Assess patient's need for assistive devices and provide as appropriate  - Encourage maximum independence but intervene and supervise when necessary  - Involve family in performance of ADLs  - Assess for home care needs following discharge   - Consider OT consult to assist with ADL evaluation and planning for discharge  - Provide patient education as appropriate  Outcome: Progressing  Goal: Maintains/Returns to pre admission functional level  Description: INTERVENTIONS:  - Perform AM-PAC 6 Click Basic Mobility/ Daily Activity assessment daily.  - Set and communicate daily mobility goal to care team and patient/family/caregiver.   - Collaborate with rehabilitation services on mobility goals if consulted  - Perform Range of Motion x times a day.  - Reposition patient every x hours.  - Dangle patient x times a day  - Stand patient x times a day  - Ambulate patient x times a day  - Out of bed to chair x times a day   - Out of bed for meals x times a day  - Out of bed for toileting  - Record patient progress and toleration of activity level   Outcome: Progressing     Problem: DISCHARGE PLANNING  Goal: Discharge to home or other facility with appropriate resources  Description: INTERVENTIONS:  - Identify barriers to discharge w/patient and caregiver  - Arrange for needed discharge resources and transportation as appropriate  - Identify discharge learning needs (meds, wound care, etc.)  - Arrange for interpretive services to assist at discharge as needed  - Refer to Case Management Department for coordinating discharge planning if the patient needs post-hospital services based on physician/advanced practitioner order or complex needs related to functional status,  cognitive ability, or social support system  Outcome: Progressing     Problem: Knowledge Deficit  Goal: Patient/family/caregiver demonstrates understanding of disease process, treatment plan, medications, and discharge instructions  Description: Complete learning assessment and assess knowledge base.  Interventions:  - Provide teaching at level of understanding  - Provide teaching via preferred learning methods  Outcome: Progressing     Problem: Nutrition/Hydration-ADULT  Goal: Nutrient/Hydration intake appropriate for improving, restoring or maintaining nutritional needs  Description: Monitor and assess patient's nutrition/hydration status for malnutrition. Collaborate with interdisciplinary team and initiate plan and interventions as ordered.  Monitor patient's weight and dietary intake as ordered or per policy. Utilize nutrition screening tool and intervene as necessary. Determine patient's food preferences and provide high-protein, high-caloric foods as appropriate.     INTERVENTIONS:  - Monitor oral intake, urinary output, labs, and treatment plans  - Assess nutrition and hydration status and recommend course of action  - Evaluate amount of meals eaten  - Assist patient with eating if necessary   - Allow adequate time for meals  - Recommend/ encourage appropriate diets, oral nutritional supplements, and vitamin/mineral supplements  - Order, calculate, and assess calorie counts as needed  - Recommend, monitor, and adjust tube feedings and TPN/PPN based on assessed needs  - Assess need for intravenous fluids  - Provide specific nutrition/hydration education as appropriate  - Include patient/family/caregiver in decisions related to nutrition  Outcome: Progressing

## 2025-03-14 NOTE — ASSESSMENT & PLAN NOTE
Patient was admitted from nursing facility with fever and hypotension  Tested positive for influenza A and urine suggestive of urinary tract infection  Admission CT with left lower lobe consolidation  Met criteria for septic shock and required vasopressor support transiently.  Started on broad-spectrum antibiotic (vancomycin and cefepime) with Tamiflu for acute influenza  Blood cultures remain negative to date.  Urine culture with procidentia  MRSA swab negative.  Vancomycin discontinued  Antibiotic switched to ceftriaxone based on sensitivity result  Blood pressure has remained stable with MAP greater than 65  Fevers fortunately resolve  Stable off ivf. Resume salt tabs at dc for mild hyponatremia.

## 2025-03-14 NOTE — INCIDENTAL FINDINGS
The following findings require follow up:  Radiographic finding   Finding:  CT chest abdomen pelvis wo contrast: CT chest:, Left lower lobe airspace consolidation and bilateral multi lobar tree-in-bud infiltrates may represent pneumonia and/or aspiration., Bilateral lower lobe segmental and subsegmental endobronchial opacification, left greater than right may represent mucous plugging and/or aspiration., Stable right upper lobe cystic lesion with mural nodularity and left upper lobe spiculated lesion unchanged since November 2020. Noncontrast chest CT follow-up in 12 months is advised., Additional chronic findings and negatives as above., CT abdomen and pelvis:, Severe bilateral hydronephroureterosis extending through the distended bladder. No radiopaque urinary tract calculi or perinephric collection. Findings suggestive of acute on chronic urinary retention. Correlate with UA to exclude superimposed cystitis., Cholelithiasis., Additional chronic findings and negatives as above., The study was marked in EPIC for immediate notification., Workstation performed: [id number]       Follow up required: repeat CT chest    Follow up should be done within 12 month(s)    Please notify the following clinician to assist with the follow up:   Dr. Manoj Perez, DO      Incidental finding results were discussed with the Patient by Elise Church MD on 03/14/25.   They expressed understanding and all questions answered.

## 2025-03-14 NOTE — DISCHARGE SUMMARY
Discharge Summary - Hospitalist   Name: Trevor Waldrop 60 y.o. male I MRN: [id number]  Unit/Bed#: -01 I Date of Admission: 3/5/2025   Date of Service: 3/14/2025 I Hospital Day: 9     Assessment & Plan  Septic shock (HCC)  Patient was admitted from nursing facility with fever and hypotension  Tested positive for influenza A and urine suggestive of urinary tract infection  Admission CT with left lower lobe consolidation  Met criteria for septic shock and required vasopressor support transiently.  Started on broad-spectrum antibiotic (vancomycin and cefepime) with Tamiflu for acute influenza  Blood cultures remain negative to date.  Urine culture with procidentia  MRSA swab negative.  Vancomycin discontinued  Antibiotic switched to ceftriaxone based on sensitivity result  Blood pressure has remained stable with MAP greater than 65  Fevers fortunately resolve  Stable off ivf. Resume salt tabs at dc for mild hyponatremia.  GERD with esophagitis  -cont PPI  Bipolar disorder ([facility])  Continue Lamictal  Localization-related (focal) (partial) symptomatic epilepsy and epileptic syndromes with complex partial seizures, not intractable, with status epilepticus ([facility])  -cont Keppra  Influenza A  -cont Tamiflu  Left lower lobe pneumonia  -CT chest: Left lower lobe airspace consolidation and bilateral multi lobar tree-in-bud infiltrates may represent pneumonia and/or aspiration. Bilateral lower lobe segmental and subsegmental endobronchial opacification, left greater than right may represent mucous plugging and/or aspiration. Stable right upper lobe cystic lesion with mural nodularity and left upper lobe spiculated lesion unchanged since November 2020. Noncontrast chest CT follow-up in 12 months is advised.   -cont Rocephin  Acute cystitis with hematuria  -Required Anderson catheter placement for chronic bladder outlet obstruction and bilateral severe hydronephrosis.    -Urine culture with procidentia.    -cont Rocphein  JOHNY (acute  kidney injury) (HCC)  -POA with a creatinine of 6.36.  Prior creatinine in 2022 were 1.1-1.4.  Likely in the setting of septic shock with ATN.  Anderson catheter was placed in the emergency room.  -CT A/P on admission showing severe bilateral hydroureteronephrosis with distended bladder  -Anderson catheter was subsequently placed  -Repeat renal ultrasound shows mild to moderate dilatation of left pelvic calyceal system with severe hydronephrosis of the right kidney  -Patient seen by urology, continue Anderson catheter.  Consideration for suprapubic tube in the future  Completed IVF  Severe protein-calorie malnutrition (HCC)  Malnutrition Findings:   Adult Malnutrition type: Chronic illness  Adult Degree of Malnutrition: Other severe protein calorie malnutrition                     360 Statement: Pt presents with severe protein calorie malnutrition as evidenced by sunken orbitals, b/l temporal depressions and visble clavicle bone. Treat with diet and supplements BID.    BMI Findings:           Body mass index is 23.89 kg/m².   Encourage oral intake.  Nutrition input appreciated  Electrolyte abnormality  Multiple electrolyte abnormalities including hypokalemia hypomagnesemia and hypophosphatemia.    -2g IV Mg today  Other hydronephrosis  Repeat renal ultrasound results noted.    Urology consulted.    Maintain Anderson catheter for now.  Seizure disorder (HCC)  -cont Keppra  Anemia  Transfused 1 u PRBC  No evidence of acute loss  Maintain > 7  Hypertension     Hospital Course:     Trevor Waldrop is a 60 y.o. male patient who originally presented to the hospital on   Admission Orders (From admission, onward)       Ordered        03/05/25 0902  INPATIENT ADMISSION  Once                         due to influenza and superimposed shock. Pt stabilized and completed antimicrobial therapy with resolution of fevers as noted above. Pt has no acute complaints at [facility].    Please see above list of diagnoses and related plan for additional  information.     Physical Exam:    GEN: No acute distress, comfortable, frail appearing  HEEENT: No JVD, PERRLA, no scleral icterus  RESP: Lungs clear to auscultation bilaterally  CV: RRR, +s1/s2   ABD: SOFT NON TENDER, POSITIVE BOWEL SOUNDS, NO DISTENTION  PSYCH: CALM  NEURO: Mentation baseline, NO FOCAL DEFICITS  SKIN: NO RASH  EXTREM: NO EDEMA    CONSULTING PROVIDERS   IP CONSULT TO PHARMACY  IP CONSULT TO NEPHROLOGY  IP CONSULT TO UROLOGY    PROCEDURES PERFORMED  * No surgery found *    RADIOLOGY RESULTS  VAS upper limb venous duplex scan, unilateral/limited  Result Date: 3/10/2025  Narrative:  [facility] REPORT CLINICAL: Indications: Patient presents with left upper extremity edema. Operative History: No prior cardiovascular surgeries  FINDINGS:  Left         Thrombus  Bas Mid Arm  Chronic      CONCLUSION: Impression RIGHT UPPER LIMB LIMITED: Evaluation shows no evidence of thrombus in the internal jugular vein, subclavian vein, and the innominate vein.  LEFT UPPER LIMB: Evaluation shows chronic non-occlusive superficial thrombophlebitis in the basilic vein at mid upper arm. No evidence of acute or chronic deep vein thrombosis. Doppler evaluation shows a normal response to augmentation maneuvers.  SIGNATURE: Electronically Signed by: LEIGHA HALLMAN on 2025-03-10 01:06:38 PM    Echo complete w/ contrast if indicated  Result Date: 3/7/2025  Narrative:   Left Ventricle: Left ventricular cavity size is normal. Wall thickness is normal. The left ventricular ejection fraction is 55%. Systolic function is normal. Wall motion is normal. Diastolic function is normal.   Mitral Valve: There is trace regurgitation.     US kidney and bladder  Result Date: 3/7/2025  Narrative: RENAL ULTRASOUND INDICATION: Obstructive uropathy status post Anderson catheter make sure resolved. COMPARISON: CT from March 5, 2025 TECHNIQUE: Ultrasound of the retroperitoneum was performed with a curvilinear transducer utilizing  volumetric sweeps and still imaging techniques. FINDINGS: KIDNEYS: Symmetric and normal size. Right kidney: 10.1 x 7.9 x 6.0 cm. Volume 248.4 mL Left kidney: 10.4 x 6.8 x 5.4 cm. Volume 198.3 mL Right kidney Normal echogenicity and contour. No mass is identified. Severe hydronephrosis is noted with No shadowing calculi. No perinephric fluid collections. Left kidney Normal echogenicity and contour. No mass is identified. Mild to moderate dilatation of the left pelvicalyceal system seen. The dilatation of the renal sinus is decreased as compared to the previous study from March 5, 2025. A 3 mm echogenic focus upper pole left kidney nonobstructing No perinephric fluid collections. URETERS: Nonvisualized. BLADDER: Normally distended. No focal thickening or mass lesions. Both ureteral jets are not, nonspecific     Impression: Mild to moderate dilatation of the left pelvicalyceal system, decreased from the previous study of March 5, 2025 Severe hydronephrosis right kidney with cortical thinning likely sequela of chronic bladder outlet obstruction Workstation performed: [id number]     XR chest portable  Result Date: 3/5/2025  Narrative: XR CHEST PORTABLE INDICATION: fever. COMPARISON: 11/13/2020 and CT scan from 11/14/2020 FINDINGS: There is infiltrate in the left and right lower lung fields suggestive of pneumonia. The cystic lesion with internal nodule which was noted on the prior CT is not visible on plain film. The small spiculated nodule in the left lung which was mentioned on the prior CT is also not readily apparent on plain film. No pneumothorax or pleural effusion. Normal cardiomediastinal silhouette. Bones are unremarkable for age. Normal upper abdomen.     Impression: Bilateral infiltrates left greater than right suspicious for pneumonia. The lesions seen on the prior CT are not visible on x-ray. Please refer to the report of the follow-up CT scan obtained after this x-ray for further details Workstation  performed: [id number]     CT chest abdomen pelvis wo contrast  Result Date: 3/5/2025  Narrative: CT CHEST, ABDOMEN AND PELVIS WITHOUT IV CONTRAST INDICATION: fever, abdominal pain,. COMPARISON: CT chest abdomen pelvis 11/14/2020 TECHNIQUE: CT examination of the chest, abdomen and pelvis was performed without intravenous contrast. Multiplanar 2D reformatted images were created from the source data. This examination, like all CT scans performed in the [facility], was performed utilizing techniques to minimize radiation dose exposure, including the use of iterative reconstruction and automated exposure control. Radiation dose length product (DLP) for this visit: 688.73 mGy-cm Enteric Contrast: Not administered. FINDINGS: CHEST LUNGS: Moderate left lower lobe airspace consolidation. Tree-in-bud infiltrates in bilateral lower and right upper lobes. Minimal biapical scarring. 1.8 cm right upper lobe pulmonary cyst with anterior peripheral nodularity measuring up to 6 mm, unchanged when measured in the same fashion image 49 series 4. 1.3 cm left upper lobe spiculated lesion with soft tissue tentacles radiating to the adjacent pleural surface most likely scar, unchanged when measured in the same fashion image 49 series 4. Scattered benign calcified granulomata. Mild bilateral multi lobar bronchial wall thickening and bronchiolectasis. Bilateral lower lobe segmental and subsegmental endobronchial opacification, left greater than right may represent mucous plugging and/or aspiration. PLEURA: Unremarkable. HEART/GREAT VESSELS: Heart is not enlarged.  No pericardial effusion.  Aortic and coronary artery calcification.  No thoracic aortic aneurysm. MEDIASTINUM AND IZABELLA: Small hiatal hernia. Small amount of fluid in the patulous esophagus suggestive of gastroesophageal reflux. Mild distal esophageal thickening may be under distention or esophagitis. No enlarged lymph nodes. CHEST WALL AND LOWER NECK: Decreased  subcutaneous fat suggestive of cachexia. ABDOMEN Streak artifact; patient scanned with arms by his side. LIVER/BILIARY TREE: Unremarkable. GALLBLADDER: Cholelithiasis without findings of acute cholecystitis. SPLEEN: Unremarkable. PANCREAS: Unremarkable. ADRENAL GLANDS: Unremarkable. KIDNEYS/URETERS: Severe bilateral hydronephroureterosis extending through the UVJs. No radiopaque urinary tract calculi. Severe right renal cortical thinning. No loculated perinephric collection. STOMACH AND BOWEL: GI evaluation limited without IV and oral contrast. Moderate stool in the rectum. No bowel obstruction. APPENDIX: No findings to suggest appendicitis. ABDOMINOPELVIC CAVITY: No ascites. No pneumoperitoneum. No bulky enlarged lymph nodes. VESSELS: Atherosclerotic disease. No abdominal aortic aneurysm. PELVIS REPRODUCTIVE ORGANS: Unremarkable for patient's age. URINARY BLADDER: Distended bladder with mild diffuse wall thickening scattered diverticula and mild adjacent fat stranding. ABDOMINAL WALL/INGUINAL REGIONS: Small periumbilical fat-containing hernia. Decreased subcutaneous fat suggestive of cachexia. Anterior right lower quadrant subcutaneous stranding and tiny gas droplets may be sequela of recent injection. BONES: No acute fracture or osseous destructive lesion identified. Mild degenerative changes of the spine and multiple joints. Stable bilateral femoral head AVN without subchondral collapse. Partially visualized left humeral head AVN also present on the prior study. High riding right humerus suggestive of chronic rotator cuff tear.     Impression: CT chest: Left lower lobe airspace consolidation and bilateral multi lobar tree-in-bud infiltrates may represent pneumonia and/or aspiration. Bilateral lower lobe segmental and subsegmental endobronchial opacification, left greater than right may represent mucous plugging and/or aspiration. Stable right upper lobe cystic lesion with mural nodularity and left upper lobe  spiculated lesion unchanged since November 2020. Noncontrast chest CT follow-up in 12 months is advised. Additional chronic findings and negatives as above. CT abdomen and pelvis: Severe bilateral hydronephroureterosis extending through the distended bladder. No radiopaque urinary tract calculi or perinephric collection. Findings suggestive of acute on chronic urinary retention. Correlate with UA to exclude superimposed cystitis. Cholelithiasis. Additional chronic findings and negatives as above. The study was marked in EPIC for immediate notification. Workstation performed: [id number]       LABS  Results from last 7 days   Lab Units 03/14/25  0546 03/13/25  0557 03/12/25  0608 03/12/25  0227 03/11/25  0426 03/10/25  0337 03/09/25  0439 03/08/25  0927   WBC Thousand/uL 5.82 6.66  --  6.39 6.03 8.11 10.45* 9.69   HEMOGLOBIN g/dL 8.5* 9.3* 6.9* 6.6* 7.9* 7.8* 8.7* 8.6*   HEMATOCRIT % 26.0* 28.4* 22.3* 21.2* 25.0* 24.7* 27.8* 27.9*   MCV fL 97 95  --  100* 99* 99* 100* 102*   TOTAL NEUT ABS Thousand/uL  --   --   --   --  4.40  --   --   --    PLATELETS Thousands/uL 275 243  --  182 203 226 263 193     Results from last 7 days   Lab Units 03/14/25  0546 03/13/25  0557 03/12/25  0227 03/11/25  0426 03/10/25  0337 03/09/25  0439 03/08/25  0511   SODIUM mmol/L 130* 130* 132* 136 143 148* 145   POTASSIUM mmol/L 4.8 4.3 4.1 4.1 3.3* 3.5 4.1   CHLORIDE mmol/L 94* 97 103 104 110* 109* 111*   CO2 mmol/L 30 26 22 23 24 27 22   BUN mg/dL 19 15 17 18 22 28* 36*   CREATININE mg/dL 1.14 1.13 1.14 1.13 1.27 1.50* 1.65*   CALCIUM mg/dL 9.0 8.4 7.9* 7.8* 7.9* 8.2* 7.8*   ALBUMIN g/dL 2.8* 2.6* 2.5* 2.2*  --   --   --    TOTAL BILIRUBIN mg/dL 0.26 0.34 0.39 0.35  --   --   --    ALK PHOS U/L 75 63 55 67  --   --   --    ALT U/L 24 18 17 21  --   --   --    AST U/L 32 23 24 31  --   --   --    EGFR ml/min/1.73sq m 69 70 69 70 61 49 44   GLUCOSE RANDOM mg/dL 88 92 98 90 104 101 91                  Results from last 7 days   Lab Units  "03/14/25  0743 03/14/25  0605 03/13/25  2340 03/13/25  1815 03/13/25  1117 03/13/25  0627 03/13/25  0003 03/12/25  1604 03/12/25  1142 03/12/25  0602   POC GLUCOSE mg/dl 106 96 125 110 122 93 103 113 120 100                       Cultures:         Invalid input(s): \"URIBILINOGEN\"                Condition at Discharge:  fair    Discharge instructions/Information to patient and family:   See after visit summary for information provided to patient and family.  The above hospital course, results, incidental findings, need for follow up was discussed in detail with the patient and/or family.    Provisions for Follow-Up Care:  See after visit summary for information related to follow-up care and any pertinent home health orders.      Disposition:     Home       Discharge Statement:  I spent 37 minutes discharging the patient. This time was spent on the day of discharge. I had direct contact with the patient on the day of discharge. Greater than 50% of the total time was spent examining patient, answering all patient questions, arranging and discussing plan of care with patient as well as directly providing post-discharge instructions.  Additional time then spent on discharge activities.    Discharge Medications:  See after visit summary for reconciled discharge medications provided to patient and family.      ** Please Note: This note has been constructed using a voice recognition system **  "

## 2025-03-17 NOTE — UTILIZATION REVIEW
NOTIFICATION OF ADMISSION DISCHARGE   This is a Notification of Discharge from [facility]. Please be advised that this patient has been discharge from our facility. Below you will find the admission and discharge date and time including the patient’s disposition.   UTILIZATION REVIEW CONTACT:  Shani Boyer  Utilization   Network Utilization Review Department  Phone: [phone] x carefully listen to the prompts. All voicemails are confidential.  Email: [email or web address]     ADMISSION INFORMATION  PRESENTATION DATE: 3/5/2025  4:31 AM  OBERVATION ADMISSION DATE: N/A  INPATIENT ADMISSION DATE: 3/5/25  9:02 AM   DISCHARGE DATE: 3/14/2025  2:27 PM   DISPOSITION:Non [facility] SNF/TCU/SNU    Network Utilization Review Department  ATTENTION: Please call with any questions or concerns to [phone] and carefully listen to the prompts so that you are directed to the right person. All voicemails are confidential.   For Discharge needs, contact Care Management DC Support Team at [phone] opt. [phone]  Send all requests for admission clinical reviews, approved or denied determinations and any other requests to dedicated fax number below belonging to the campus where the patient is receiving treatment. List of dedicated fax numbers for the Facilities:  FACILITY NAME UR FAX NUMBER   ADMISSION DENIALS (Administrative/Medical Necessity) [phone]   DISCHARGE SUPPORT TEAM ([facility]) [phone]   PARENT CHILD HEALTH (Maternity/NICU/Pediatrics) [phone]   [facility] [phone]   [facility] [phone]   [facility] [phone]   [facility] [phone]   [facility] [phone]   [facility] [phone]   [facility] [phone]   [facility]  [phone]   [facility] [phone]   [facility] [phone]   [facility] [phone]   [facility] [phone]

## 2025-03-17 NOTE — TELEPHONE ENCOUNTER
Patient had catheter place 3/5 - needs follow up to discuss SPT placement called derek Almanza and spoke with Reina who sent  to another person - Tiffani - scheduled Trevor for appointment at 9:30 and theat would be seeing nurse and provider

## 2025-04-02 ENCOUNTER — TELEPHONE (OUTPATIENT)
Dept: UROLOGY | Facility: HOSPITAL | Age: 60
End: 2025-04-02

## 2025-04-02 NOTE — TELEPHONE ENCOUNTER
Called Bruno [facility]manza as debra missed his catheter exchange with nurse and follow up appointment with Cleo smith with [facility] - scheduled Debra with nurse for next week in [place]

## 2025-04-04 PROBLEM — J18.9 LEFT LOWER LOBE PNEUMONIA: Status: RESOLVED | Noted: 2025-03-05 | Resolved: 2025-04-04

## 2025-04-04 PROBLEM — N30.01 ACUTE CYSTITIS WITH HEMATURIA: Status: RESOLVED | Noted: 2025-03-05 | Resolved: 2025-04-04

## 2025-04-04 PROBLEM — J10.1 INFLUENZA A: Status: RESOLVED | Noted: 2025-03-05 | Resolved: 2025-04-04

## 2025-04-30 ENCOUNTER — TELEPHONE (OUTPATIENT)
Dept: UROLOGY | Facility: HOSPITAL | Age: 60
End: 2025-04-30

## 2025-04-30 NOTE — TELEPHONE ENCOUNTER
"Today was the 3rd No Show for this PT who lives in a facility.  I sent our \"NO SHOW\" policy to the facility.  "